# Patient Record
Sex: FEMALE | Race: WHITE | NOT HISPANIC OR LATINO | Employment: OTHER | URBAN - METROPOLITAN AREA
[De-identification: names, ages, dates, MRNs, and addresses within clinical notes are randomized per-mention and may not be internally consistent; named-entity substitution may affect disease eponyms.]

---

## 2023-05-04 ENCOUNTER — HOSPITAL ENCOUNTER (INPATIENT)
Facility: HOSPITAL | Age: 68
LOS: 13 days | Discharge: NURSING, REHAB OR LONG-TERM CARE | End: 2023-05-17
Attending: EMERGENCY MEDICINE | Admitting: INTERNAL MEDICINE
Payer: MEDICARE

## 2023-05-04 ENCOUNTER — APPOINTMENT (OUTPATIENT)
Dept: CT IMAGING | Facility: HOSPITAL | Age: 68
End: 2023-05-04
Payer: MEDICARE

## 2023-05-04 ENCOUNTER — APPOINTMENT (OUTPATIENT)
Dept: GENERAL RADIOLOGY | Facility: HOSPITAL | Age: 68
End: 2023-05-04
Payer: MEDICARE

## 2023-05-04 DIAGNOSIS — R77.8 ELEVATED TROPONIN: ICD-10-CM

## 2023-05-04 DIAGNOSIS — D64.9 ANEMIA, UNSPECIFIED TYPE: ICD-10-CM

## 2023-05-04 DIAGNOSIS — A41.9 SEPSIS, DUE TO UNSPECIFIED ORGANISM, UNSPECIFIED WHETHER ACUTE ORGAN DYSFUNCTION PRESENT: Primary | ICD-10-CM

## 2023-05-04 DIAGNOSIS — E87.1 HYPONATREMIA: ICD-10-CM

## 2023-05-04 DIAGNOSIS — R41.82 ALTERED MENTAL STATUS, UNSPECIFIED ALTERED MENTAL STATUS TYPE: ICD-10-CM

## 2023-05-04 DIAGNOSIS — N39.0 ACUTE UTI: ICD-10-CM

## 2023-05-04 DIAGNOSIS — R73.9 HYPERGLYCEMIA: ICD-10-CM

## 2023-05-04 LAB
ALBUMIN SERPL-MCNC: 3 G/DL (ref 3.5–5.2)
ALBUMIN SERPL-MCNC: 3.1 G/DL (ref 3.5–5.2)
ALBUMIN/GLOB SERPL: 1 G/DL
ALP SERPL-CCNC: 39 U/L (ref 39–117)
ALT SERPL W P-5'-P-CCNC: 14 U/L (ref 1–33)
ANION GAP SERPL CALCULATED.3IONS-SCNC: 11 MMOL/L (ref 5–15)
ANION GAP SERPL CALCULATED.3IONS-SCNC: 13.3 MMOL/L (ref 5–15)
ANION GAP SERPL CALCULATED.3IONS-SCNC: 13.4 MMOL/L (ref 5–15)
ARTERIAL PATENCY WRIST A: POSITIVE
AST SERPL-CCNC: 36 U/L (ref 1–32)
ATMOSPHERIC PRESS: 748.1 MMHG
BACTERIA UR QL AUTO: ABNORMAL /HPF
BASE EXCESS BLDA CALC-SCNC: 3.3 MMOL/L (ref 0–2)
BASOPHILS # BLD AUTO: 0.02 10*3/MM3 (ref 0–0.2)
BASOPHILS NFR BLD AUTO: 0.2 % (ref 0–1.5)
BDY SITE: ABNORMAL
BILIRUB SERPL-MCNC: 0.3 MG/DL (ref 0–1.2)
BILIRUB UR QL STRIP: NEGATIVE
BUN SERPL-MCNC: 31 MG/DL (ref 8–23)
BUN SERPL-MCNC: 34 MG/DL (ref 8–23)
BUN SERPL-MCNC: 42 MG/DL (ref 8–23)
BUN/CREAT SERPL: 50 (ref 7–25)
BUN/CREAT SERPL: 51.5 (ref 7–25)
BUN/CREAT SERPL: 57.4 (ref 7–25)
CALCIUM SPEC-SCNC: 7.9 MG/DL (ref 8.6–10.5)
CALCIUM SPEC-SCNC: 8.1 MG/DL (ref 8.6–10.5)
CALCIUM SPEC-SCNC: 9 MG/DL (ref 8.6–10.5)
CHLORIDE SERPL-SCNC: 111 MMOL/L (ref 98–107)
CHLORIDE SERPL-SCNC: 113 MMOL/L (ref 98–107)
CHLORIDE SERPL-SCNC: 115 MMOL/L (ref 98–107)
CLARITY UR: ABNORMAL
CO2 SERPL-SCNC: 23.7 MMOL/L (ref 22–29)
CO2 SERPL-SCNC: 24 MMOL/L (ref 22–29)
CO2 SERPL-SCNC: 24.6 MMOL/L (ref 22–29)
COLOR UR: YELLOW
CREAT SERPL-MCNC: 0.54 MG/DL (ref 0.57–1)
CREAT SERPL-MCNC: 0.66 MG/DL (ref 0.57–1)
CREAT SERPL-MCNC: 0.84 MG/DL (ref 0.57–1)
D-LACTATE SERPL-SCNC: 3.6 MMOL/L (ref 0.5–2)
D-LACTATE SERPL-SCNC: 3.6 MMOL/L (ref 0.5–2)
D-LACTATE SERPL-SCNC: 3.7 MMOL/L (ref 0.5–2)
D-LACTATE SERPL-SCNC: 5.1 MMOL/L (ref 0.5–2)
DEPRECATED RDW RBC AUTO: 52.5 FL (ref 37–54)
EGFRCR SERPLBLD CKD-EPI 2021: 101.1 ML/MIN/1.73
EGFRCR SERPLBLD CKD-EPI 2021: 76.3 ML/MIN/1.73
EGFRCR SERPLBLD CKD-EPI 2021: 96.3 ML/MIN/1.73
EOSINOPHIL # BLD AUTO: 0 10*3/MM3 (ref 0–0.4)
EOSINOPHIL NFR BLD AUTO: 0 % (ref 0.3–6.2)
ERYTHROCYTE [DISTWIDTH] IN BLOOD BY AUTOMATED COUNT: 14.4 % (ref 12.3–15.4)
GEN 5 2HR TROPONIN T REFLEX: 29 NG/L
GLOBULIN UR ELPH-MCNC: 3 GM/DL
GLUCOSE BLDC GLUCOMTR-MCNC: 177 MG/DL (ref 70–130)
GLUCOSE BLDC GLUCOMTR-MCNC: 181 MG/DL (ref 70–130)
GLUCOSE BLDC GLUCOMTR-MCNC: 226 MG/DL (ref 70–130)
GLUCOSE BLDC GLUCOMTR-MCNC: 230 MG/DL (ref 70–130)
GLUCOSE SERPL-MCNC: 169 MG/DL (ref 65–99)
GLUCOSE SERPL-MCNC: 187 MG/DL (ref 65–99)
GLUCOSE SERPL-MCNC: 195 MG/DL (ref 65–99)
GLUCOSE UR STRIP-MCNC: NEGATIVE MG/DL
HCO3 BLDA-SCNC: 29.7 MMOL/L (ref 22–28)
HCT VFR BLD AUTO: 33.8 % (ref 34–46.6)
HGB BLD-MCNC: 11 G/DL (ref 12–15.9)
HGB UR QL STRIP.AUTO: ABNORMAL
HYALINE CASTS UR QL AUTO: ABNORMAL /LPF
INHALED O2 CONCENTRATION: 100 %
KETONES UR QL STRIP: ABNORMAL
LEUKOCYTE ESTERASE UR QL STRIP.AUTO: ABNORMAL
LIPASE SERPL-CCNC: 73 U/L (ref 13–60)
LYMPHOCYTES # BLD AUTO: 1.43 10*3/MM3 (ref 0.7–3.1)
LYMPHOCYTES NFR BLD AUTO: 13.7 % (ref 19.6–45.3)
MCH RBC QN AUTO: 32.6 PG (ref 26.6–33)
MCHC RBC AUTO-ENTMCNC: 32.5 G/DL (ref 31.5–35.7)
MCV RBC AUTO: 100.3 FL (ref 79–97)
MODALITY: ABNORMAL
MONOCYTES # BLD AUTO: 0.73 10*3/MM3 (ref 0.1–0.9)
MONOCYTES NFR BLD AUTO: 7 % (ref 5–12)
MRSA DNA SPEC QL NAA+PROBE: ABNORMAL
NEUTROPHILS NFR BLD AUTO: 78.4 % (ref 42.7–76)
NEUTROPHILS NFR BLD AUTO: 8.17 10*3/MM3 (ref 1.7–7)
NITRITE UR QL STRIP: NEGATIVE
O2 A-A PPRESDIFF RESPIRATORY: 0.7 MMHG
PCO2 BLDA: 51 MM HG (ref 35–45)
PEEP RESPIRATORY: 5 CM[H2O]
PH BLDA: 7.37 PH UNITS (ref 7.35–7.45)
PH UR STRIP.AUTO: 5.5 [PH] (ref 5–8)
PHOSPHATE SERPL-MCNC: 2.7 MG/DL (ref 2.5–4.5)
PLATELET # BLD AUTO: 98 10*3/MM3 (ref 140–450)
PMV BLD AUTO: 13.8 FL (ref 6–12)
PO2 BLDA: 463.2 MM HG (ref 80–100)
POTASSIUM SERPL-SCNC: 3 MMOL/L (ref 3.5–5.2)
POTASSIUM SERPL-SCNC: 3.5 MMOL/L (ref 3.5–5.2)
POTASSIUM SERPL-SCNC: 5 MMOL/L (ref 3.5–5.2)
PROCALCITONIN SERPL-MCNC: 0.33 NG/ML (ref 0–0.25)
PROT SERPL-MCNC: 6 G/DL (ref 6–8.5)
PROT UR QL STRIP: ABNORMAL
QT INTERVAL: 379 MS
RBC # BLD AUTO: 3.37 10*6/MM3 (ref 3.77–5.28)
RBC # UR STRIP: ABNORMAL /HPF
REF LAB TEST METHOD: ABNORMAL
SAO2 % BLDCOA: 100 % (ref 92–99)
SET MECH RESP RATE: 14
SODIUM SERPL-SCNC: 149 MMOL/L (ref 136–145)
SODIUM SERPL-SCNC: 150 MMOL/L (ref 136–145)
SODIUM SERPL-SCNC: 150 MMOL/L (ref 136–145)
SP GR UR STRIP: 1.02 (ref 1–1.03)
SQUAMOUS #/AREA URNS HPF: ABNORMAL /HPF
TOTAL RATE: 16 BREATHS/MINUTE
TROPONIN T DELTA: -12 NG/L
TROPONIN T SERPL HS-MCNC: 41 NG/L
UROBILINOGEN UR QL STRIP: ABNORMAL
VENTILATOR MODE: ABNORMAL
VT ON VENT VENT: 500 ML
WBC # UR STRIP: ABNORMAL /HPF
WBC NRBC COR # BLD: 10.42 10*3/MM3 (ref 3.4–10.8)

## 2023-05-04 PROCEDURE — 93010 ELECTROCARDIOGRAM REPORT: CPT | Performed by: INTERNAL MEDICINE

## 2023-05-04 PROCEDURE — 99285 EMERGENCY DEPT VISIT HI MDM: CPT

## 2023-05-04 PROCEDURE — 82948 REAGENT STRIP/BLOOD GLUCOSE: CPT

## 2023-05-04 PROCEDURE — 83690 ASSAY OF LIPASE: CPT | Performed by: EMERGENCY MEDICINE

## 2023-05-04 PROCEDURE — 87641 MR-STAPH DNA AMP PROBE: CPT | Performed by: INTERNAL MEDICINE

## 2023-05-04 PROCEDURE — 94799 UNLISTED PULMONARY SVC/PX: CPT

## 2023-05-04 PROCEDURE — P9612 CATHETERIZE FOR URINE SPEC: HCPCS

## 2023-05-04 PROCEDURE — 25510000001 IOPAMIDOL PER 1 ML: Performed by: EMERGENCY MEDICINE

## 2023-05-04 PROCEDURE — 84100 ASSAY OF PHOSPHORUS: CPT | Performed by: INTERNAL MEDICINE

## 2023-05-04 PROCEDURE — 87040 BLOOD CULTURE FOR BACTERIA: CPT | Performed by: EMERGENCY MEDICINE

## 2023-05-04 PROCEDURE — 36415 COLL VENOUS BLD VENIPUNCTURE: CPT

## 2023-05-04 PROCEDURE — 25010000002 HEPARIN (PORCINE) PER 1000 UNITS: Performed by: INTERNAL MEDICINE

## 2023-05-04 PROCEDURE — 70450 CT HEAD/BRAIN W/O DYE: CPT

## 2023-05-04 PROCEDURE — 5A1955Z RESPIRATORY VENTILATION, GREATER THAN 96 CONSECUTIVE HOURS: ICD-10-PCS | Performed by: HOSPITALIST

## 2023-05-04 PROCEDURE — 93005 ELECTROCARDIOGRAM TRACING: CPT | Performed by: INTERNAL MEDICINE

## 2023-05-04 PROCEDURE — 71275 CT ANGIOGRAPHY CHEST: CPT

## 2023-05-04 PROCEDURE — 25010000002 PIPERACILLIN SOD-TAZOBACTAM PER 1 G: Performed by: EMERGENCY MEDICINE

## 2023-05-04 PROCEDURE — 25010000002 VANCOMYCIN 10 G RECONSTITUTED SOLUTION: Performed by: EMERGENCY MEDICINE

## 2023-05-04 PROCEDURE — 71045 X-RAY EXAM CHEST 1 VIEW: CPT

## 2023-05-04 PROCEDURE — 94761 N-INVAS EAR/PLS OXIMETRY MLT: CPT

## 2023-05-04 PROCEDURE — 94760 N-INVAS EAR/PLS OXIMETRY 1: CPT

## 2023-05-04 PROCEDURE — 25010000002 VANCOMYCIN PER 500 MG: Performed by: INTERNAL MEDICINE

## 2023-05-04 PROCEDURE — 82803 BLOOD GASES ANY COMBINATION: CPT

## 2023-05-04 PROCEDURE — 87186 SC STD MICRODIL/AGAR DIL: CPT | Performed by: INTERNAL MEDICINE

## 2023-05-04 PROCEDURE — 84484 ASSAY OF TROPONIN QUANT: CPT | Performed by: EMERGENCY MEDICINE

## 2023-05-04 PROCEDURE — 84145 PROCALCITONIN (PCT): CPT | Performed by: EMERGENCY MEDICINE

## 2023-05-04 PROCEDURE — 83605 ASSAY OF LACTIC ACID: CPT | Performed by: EMERGENCY MEDICINE

## 2023-05-04 PROCEDURE — 25010000002 PIPERACILLIN SOD-TAZOBACTAM PER 1 G: Performed by: INTERNAL MEDICINE

## 2023-05-04 PROCEDURE — 94002 VENT MGMT INPAT INIT DAY: CPT

## 2023-05-04 PROCEDURE — 36600 WITHDRAWAL OF ARTERIAL BLOOD: CPT

## 2023-05-04 PROCEDURE — 87077 CULTURE AEROBIC IDENTIFY: CPT | Performed by: INTERNAL MEDICINE

## 2023-05-04 PROCEDURE — 80053 COMPREHEN METABOLIC PANEL: CPT | Performed by: INTERNAL MEDICINE

## 2023-05-04 PROCEDURE — 81001 URINALYSIS AUTO W/SCOPE: CPT | Performed by: EMERGENCY MEDICINE

## 2023-05-04 PROCEDURE — 63710000001 INSULIN REGULAR HUMAN PER 5 UNITS: Performed by: INTERNAL MEDICINE

## 2023-05-04 PROCEDURE — 85025 COMPLETE CBC W/AUTO DIFF WBC: CPT | Performed by: EMERGENCY MEDICINE

## 2023-05-04 PROCEDURE — 87086 URINE CULTURE/COLONY COUNT: CPT | Performed by: INTERNAL MEDICINE

## 2023-05-04 RX ORDER — ZINC SULFATE 50(220)MG
220 CAPSULE ORAL DAILY
COMMUNITY
End: 2023-05-17 | Stop reason: HOSPADM

## 2023-05-04 RX ORDER — SODIUM CHLORIDE 0.9 % (FLUSH) 0.9 %
10 SYRINGE (ML) INJECTION AS NEEDED
Status: DISCONTINUED | OUTPATIENT
Start: 2023-05-04 | End: 2023-05-17 | Stop reason: HOSPADM

## 2023-05-04 RX ORDER — SODIUM CHLORIDE 9 MG/ML
40 INJECTION, SOLUTION INTRAVENOUS AS NEEDED
Status: DISCONTINUED | OUTPATIENT
Start: 2023-05-04 | End: 2023-05-17 | Stop reason: HOSPADM

## 2023-05-04 RX ORDER — SODIUM CHLORIDE 450 MG/100ML
125 INJECTION, SOLUTION INTRAVENOUS CONTINUOUS
Status: DISCONTINUED | OUTPATIENT
Start: 2023-05-04 | End: 2023-05-05

## 2023-05-04 RX ORDER — HEPARIN SODIUM 5000 [USP'U]/ML
5000 INJECTION, SOLUTION INTRAVENOUS; SUBCUTANEOUS EVERY 8 HOURS SCHEDULED
Status: DISCONTINUED | OUTPATIENT
Start: 2023-05-04 | End: 2023-05-17 | Stop reason: HOSPADM

## 2023-05-04 RX ORDER — VANCOMYCIN HYDROCHLORIDE 1 G/200ML
1000 INJECTION, SOLUTION INTRAVENOUS EVERY 12 HOURS
Status: DISCONTINUED | OUTPATIENT
Start: 2023-05-04 | End: 2023-05-07

## 2023-05-04 RX ORDER — POTASSIUM CHLORIDE 1.5 G/1.77G
40 POWDER, FOR SOLUTION ORAL AS NEEDED
Status: DISCONTINUED | OUTPATIENT
Start: 2023-05-04 | End: 2023-05-16 | Stop reason: SDUPTHER

## 2023-05-04 RX ORDER — DEXTROSE MONOHYDRATE 25 G/50ML
25 INJECTION, SOLUTION INTRAVENOUS
Status: DISCONTINUED | OUTPATIENT
Start: 2023-05-04 | End: 2023-05-17 | Stop reason: HOSPADM

## 2023-05-04 RX ORDER — ASCORBIC ACID 500 MG
500 TABLET ORAL 2 TIMES DAILY
COMMUNITY
End: 2023-05-17 | Stop reason: HOSPADM

## 2023-05-04 RX ORDER — CHLORAL HYDRATE 500 MG
2000 CAPSULE ORAL 2 TIMES DAILY
COMMUNITY

## 2023-05-04 RX ORDER — CHLORHEXIDINE GLUCONATE 0.12 MG/ML
15 RINSE ORAL EVERY 12 HOURS SCHEDULED
Status: DISCONTINUED | OUTPATIENT
Start: 2023-05-04 | End: 2023-05-17 | Stop reason: HOSPADM

## 2023-05-04 RX ORDER — DEXMEDETOMIDINE HYDROCHLORIDE 4 UG/ML
.2-1.5 INJECTION, SOLUTION INTRAVENOUS
Status: DISCONTINUED | OUTPATIENT
Start: 2023-05-04 | End: 2023-05-05

## 2023-05-04 RX ORDER — ACETAMINOPHEN 500 MG
1000 TABLET ORAL ONCE
Status: DISCONTINUED | OUTPATIENT
Start: 2023-05-04 | End: 2023-05-17 | Stop reason: HOSPADM

## 2023-05-04 RX ORDER — TRAMADOL HYDROCHLORIDE 50 MG/1
50 TABLET ORAL 2 TIMES DAILY
COMMUNITY
End: 2023-05-17 | Stop reason: HOSPADM

## 2023-05-04 RX ORDER — ACETAMINOPHEN 650 MG/1
650 SUPPOSITORY RECTAL ONCE
Status: COMPLETED | OUTPATIENT
Start: 2023-05-04 | End: 2023-05-04

## 2023-05-04 RX ORDER — IBUPROFEN 200 MG
1 TABLET ORAL 2 TIMES DAILY
COMMUNITY
End: 2023-05-17 | Stop reason: HOSPADM

## 2023-05-04 RX ORDER — SODIUM CHLORIDE 0.9 % (FLUSH) 0.9 %
10 SYRINGE (ML) INJECTION EVERY 12 HOURS SCHEDULED
Status: DISCONTINUED | OUTPATIENT
Start: 2023-05-04 | End: 2023-05-17 | Stop reason: HOSPADM

## 2023-05-04 RX ORDER — POTASSIUM CHLORIDE 7.45 MG/ML
10 INJECTION INTRAVENOUS
Status: DISCONTINUED | OUTPATIENT
Start: 2023-05-04 | End: 2023-05-16 | Stop reason: SDUPTHER

## 2023-05-04 RX ORDER — CASTOR OIL AND BALSAM, PERU 788; 87 MG/G; MG/G
1 OINTMENT TOPICAL EVERY 12 HOURS SCHEDULED
Status: DISCONTINUED | OUTPATIENT
Start: 2023-05-04 | End: 2023-05-17 | Stop reason: HOSPADM

## 2023-05-04 RX ORDER — NYSTATIN 100000 [USP'U]/G
POWDER TOPICAL EVERY 12 HOURS SCHEDULED
Status: DISCONTINUED | OUTPATIENT
Start: 2023-05-04 | End: 2023-05-17 | Stop reason: HOSPADM

## 2023-05-04 RX ORDER — INSULIN GLARGINE 100 [IU]/ML
15 INJECTION, SOLUTION SUBCUTANEOUS NIGHTLY
COMMUNITY
End: 2023-05-17 | Stop reason: HOSPADM

## 2023-05-04 RX ORDER — FAMOTIDINE 10 MG/ML
20 INJECTION, SOLUTION INTRAVENOUS 2 TIMES DAILY
Status: DISCONTINUED | OUTPATIENT
Start: 2023-05-04 | End: 2023-05-08

## 2023-05-04 RX ORDER — ATORVASTATIN CALCIUM 80 MG/1
80 TABLET, FILM COATED ORAL DAILY
COMMUNITY

## 2023-05-04 RX ORDER — NICOTINE POLACRILEX 4 MG
15 LOZENGE BUCCAL
Status: DISCONTINUED | OUTPATIENT
Start: 2023-05-04 | End: 2023-05-17 | Stop reason: HOSPADM

## 2023-05-04 RX ORDER — FENTANYL CITRATE 50 UG/ML
50 INJECTION, SOLUTION INTRAMUSCULAR; INTRAVENOUS
Status: DISCONTINUED | OUTPATIENT
Start: 2023-05-04 | End: 2023-05-05

## 2023-05-04 RX ORDER — IBUPROFEN 600 MG/1
1 TABLET ORAL
Status: DISCONTINUED | OUTPATIENT
Start: 2023-05-04 | End: 2023-05-17 | Stop reason: HOSPADM

## 2023-05-04 RX ORDER — AMLODIPINE BESYLATE 10 MG/1
10 TABLET ORAL DAILY
COMMUNITY

## 2023-05-04 RX ORDER — SERTRALINE HYDROCHLORIDE 25 MG/1
75 TABLET, FILM COATED ORAL DAILY
COMMUNITY
End: 2023-05-17 | Stop reason: HOSPADM

## 2023-05-04 RX ORDER — DONEPEZIL HYDROCHLORIDE 5 MG/1
5 TABLET, FILM COATED ORAL NIGHTLY
COMMUNITY
End: 2023-05-17 | Stop reason: HOSPADM

## 2023-05-04 RX ORDER — POTASSIUM CHLORIDE 1500 MG/1
20 TABLET, EXTENDED RELEASE ORAL DAILY
COMMUNITY

## 2023-05-04 RX ORDER — ZINC OXIDE 20 %
1 OINTMENT (GRAM) TOPICAL 2 TIMES DAILY
COMMUNITY
End: 2023-05-17 | Stop reason: HOSPADM

## 2023-05-04 RX ORDER — ACETAMINOPHEN 325 MG/1
650 TABLET ORAL EVERY 6 HOURS PRN
COMMUNITY

## 2023-05-04 RX ORDER — LACTULOSE 10 G/15ML
20 SOLUTION ORAL 2 TIMES DAILY PRN
COMMUNITY
End: 2023-05-17 | Stop reason: HOSPADM

## 2023-05-04 RX ORDER — ASPIRIN 81 MG/1
81 TABLET, CHEWABLE ORAL DAILY
COMMUNITY
End: 2023-05-17 | Stop reason: HOSPADM

## 2023-05-04 RX ORDER — POTASSIUM CHLORIDE 750 MG/1
40 TABLET, FILM COATED, EXTENDED RELEASE ORAL AS NEEDED
Status: DISCONTINUED | OUTPATIENT
Start: 2023-05-04 | End: 2023-05-16 | Stop reason: SDUPTHER

## 2023-05-04 RX ADMIN — Medication 10 ML: at 08:43

## 2023-05-04 RX ADMIN — FAMOTIDINE 20 MG: 10 INJECTION INTRAVENOUS at 22:04

## 2023-05-04 RX ADMIN — NYSTATIN: 100000 POWDER TOPICAL at 14:34

## 2023-05-04 RX ADMIN — CHLORHEXIDINE GLUCONATE 0.12% ORAL RINSE 15 ML: 1.2 LIQUID ORAL at 12:40

## 2023-05-04 RX ADMIN — IOPAMIDOL 95 ML: 755 INJECTION, SOLUTION INTRAVENOUS at 07:05

## 2023-05-04 RX ADMIN — INSULIN HUMAN 2 UNITS: 100 INJECTION, SOLUTION PARENTERAL at 12:40

## 2023-05-04 RX ADMIN — SODIUM CHLORIDE 125 ML/HR: 4.5 INJECTION, SOLUTION INTRAVENOUS at 08:38

## 2023-05-04 RX ADMIN — NYSTATIN: 100000 POWDER TOPICAL at 20:37

## 2023-05-04 RX ADMIN — POTASSIUM CHLORIDE 40 MEQ: 1.5 POWDER, FOR SOLUTION ORAL at 23:42

## 2023-05-04 RX ADMIN — ACETAMINOPHEN 650 MG: 650 SUPPOSITORY RECTAL at 04:19

## 2023-05-04 RX ADMIN — FAMOTIDINE 20 MG: 10 INJECTION INTRAVENOUS at 12:40

## 2023-05-04 RX ADMIN — POTASSIUM CHLORIDE 40 MEQ: 1.5 POWDER, FOR SOLUTION ORAL at 15:50

## 2023-05-04 RX ADMIN — Medication 1750 MG: at 06:12

## 2023-05-04 RX ADMIN — INSULIN HUMAN 3 UNITS: 100 INJECTION, SOLUTION PARENTERAL at 18:34

## 2023-05-04 RX ADMIN — SODIUM CHLORIDE 125 ML/HR: 4.5 INJECTION, SOLUTION INTRAVENOUS at 16:35

## 2023-05-04 RX ADMIN — HEPARIN SODIUM 5000 UNITS: 5000 INJECTION INTRAVENOUS; SUBCUTANEOUS at 20:34

## 2023-05-04 RX ADMIN — SODIUM CHLORIDE 1000 ML: 9 INJECTION, SOLUTION INTRAVENOUS at 04:28

## 2023-05-04 RX ADMIN — POTASSIUM CHLORIDE 40 MEQ: 1.5 POWDER, FOR SOLUTION ORAL at 20:34

## 2023-05-04 RX ADMIN — TAZOBACTAM SODIUM AND PIPERACILLIN SODIUM 3.38 G: 375; 3 INJECTION, SOLUTION INTRAVENOUS at 05:10

## 2023-05-04 RX ADMIN — HEPARIN SODIUM 5000 UNITS: 5000 INJECTION INTRAVENOUS; SUBCUTANEOUS at 13:19

## 2023-05-04 RX ADMIN — VANCOMYCIN HYDROCHLORIDE 1000 MG: 1 INJECTION, SOLUTION INTRAVENOUS at 18:35

## 2023-05-04 RX ADMIN — DEXMEDETOMIDINE HYDROCHLORIDE 0.2 MCG/KG/HR: 4 INJECTION, SOLUTION INTRAVENOUS at 12:25

## 2023-05-04 RX ADMIN — Medication 10 ML: at 20:35

## 2023-05-04 RX ADMIN — INSULIN HUMAN 3 UNITS: 100 INJECTION, SOLUTION PARENTERAL at 23:54

## 2023-05-04 RX ADMIN — TAZOBACTAM SODIUM AND PIPERACILLIN SODIUM 3.38 G: 375; 3 INJECTION, SOLUTION INTRAVENOUS at 19:38

## 2023-05-04 RX ADMIN — CHLORHEXIDINE GLUCONATE 0.12% ORAL RINSE 15 ML: 1.2 LIQUID ORAL at 20:34

## 2023-05-04 RX ADMIN — TAZOBACTAM SODIUM AND PIPERACILLIN SODIUM 3.38 G: 375; 3 INJECTION, SOLUTION INTRAVENOUS at 10:14

## 2023-05-04 RX ADMIN — CASTOR OIL AND BALSAM, PERU 1 APPLICATION: 788; 87 OINTMENT TOPICAL at 20:34

## 2023-05-04 NOTE — PROGRESS NOTES
Clinical Pharmacy Services: Medication History    Shama Cruz is a 68 y.o. female presenting to [facility] for Hyponatremia [E87.1]  Hyperglycemia [R73.9]  Elevated troponin [R77.8]  Acute UTI [N39.0]  Altered mental status, unspecified altered mental status type [R41.82]  Anemia, unspecified type [D64.9]  Sepsis, due to unspecified organism, unspecified whether acute organ dysfunction present [A41.9]    She  has a past medical history of Cerebral infarction, Chronic respiratory failure, Dementia, Depression, Diabetes mellitus, and Hypertension.    Allergies as of 05/04/2023    (No Known Allergies)       Medication information was obtained from: records provided by [facility]  Pharmacy and Phone Number:     Prior to Admission Medications       Prescriptions Last Dose Informant Patient Reported? Taking?    acetaminophen (TYLENOL) 325 MG tablet   Yes Yes    Administer 2 tablets per G tube Every 6 (Six) Hours As Needed for Mild Pain.    amLODIPine (NORVASC) 10 MG tablet   Yes Yes    Administer 1 tablet per G tube Daily.    ascorbic acid (VITAMIN C) 500 MG tablet   Yes Yes    Administer 1 tablet per G tube 2 (Two) Times a Day.    aspirin 81 MG chewable tablet   Yes Yes    Administer 1 tablet per G tube Daily.    atorvastatin (LIPITOR) 80 MG tablet   Yes Yes    Administer 1 tablet per G tube Daily.    Cholecalciferol liquid   Yes Yes    Administer 800 Units per G tube Daily.    donepezil (ARICEPT) 5 MG tablet  Nursing Home Yes Yes    Take 1 tablet by mouth Every Night.    insulin glargine (LANTUS, SEMGLEE) 100 UNIT/ML injection  Nursing Home Yes Yes    Inject 15 Units under the skin into the appropriate area as directed Every Night.    lactulose (CHRONULAC) 10 GM/15ML solution   Yes Yes    Administer 30 mL per G tube 2 (Two) Times a Day As Needed. For high ammonia levels, hold for more than 2 loose stools    metFORMIN (GLUCOPHAGE) 500 MG tablet   Yes Yes    Administer 1 tablet per G tube 2 (Two)  Times a Day With Meals.    metoprolol tartrate (LOPRESSOR) 25 MG tablet   Yes Yes    Administer 12.5 mg per G tube 2 (Two) Times a Day.    Multiple Vitamins-Minerals (Multivitamin Adult, Minerals,) tablet  Nursing Home Yes Yes    Administer 1 tablet per G tube Daily.    neomycin-bacitracin-polymyxin (NEOSPORIN) 5-400-5000 ointment  Nursing Home Yes Yes    Apply 1 application topically to the appropriate area as directed 2 (Two) Times a Day. To back of rt ear    Omega-3 Fatty Acids (fish oil) 1000 MG capsule capsule   Yes Yes    Take 2 capsules by mouth 2 (Two) Times a Day.    potassium chloride ER (K-TAB) 20 MEQ tablet controlled-release ER tablet   Yes Yes    1 tablet Daily.    sertraline (ZOLOFT) 25 MG tablet  Nursing Home Yes Yes    Administer 3 tablets per G tube Daily. Takes 25mg + 50mg for total 75mg daily    traMADol (ULTRAM) 50 MG tablet   Yes Yes    Administer 1 tablet per G tube 2 (Two) Times a Day.    Wound Dressings (MEDIHONEY WOUND/BURN DRESSING EX)  Nursing Home Yes Yes    Apply  topically 2 (Two) Times a Day. To bilateral buttocks wound    zinc oxide 20 % ointment   Yes Yes    Apply 1 application topically to the appropriate area as directed 2 (Two) Times a Day. To left inner ankle    zinc sulfate (ZINCATE) 220 (50 Zn) MG capsule  Nursing Home Yes Yes    Administer 1 capsule per G tube Daily.          Medication notes:     This medication list is complete to the best of my knowledge as of 5/4/2023    Please call if questions.    Vikki Torres, PharmD, BCPS  5/4/2023 15:28 EDT

## 2023-05-04 NOTE — ED TRIAGE NOTES
Pt arrived via ems from [facility]. Facility reported pt had a fever tonight and was given 650mg of tylenol. Upon EMS arrival pt was in respiratory distress. EMS intubated and gave 10 versed and 4 of etomidate en route.    Statement Selected

## 2023-05-04 NOTE — CONSULTS
"Nutrition Services    Patient Name:  Shama Cruz  YOB: 1955  MRN: [id number]  Admit Date:  5/4/2023  Assessment Date:  05/04/23    CLINICAL NUTRITION ASSESSMENT    Comments: Consult for tube feeding assessment    Pt admitted early this morning from a nursing home, intubated en route by EMS. Noted to be septic and in hypoxic respiratory failure. PEG in place. Hx of prior stroke and dementia; diabetic as well.    Current labs: Na 150, K 3.5, Cl 115, Glu 187, BUN 34 (trending down), Lipase 73, Procal 0.33    Skin intact. Obese, BMI 34. Last BM unknown. Remains on vent. IV fluids infusing at 125 ml/hr. No family at bedside.     Plan/Recommend:  1. Initiate tube feeds with Diabetisource AC @ 20 ml/hr. Advance by 10 ml Q 8 hr to goal rate 50 ml/hr. Provision: 1440 kcal, 72 gm protein, 984 ml free water.     2. Water flushes 30 ml Q 4 hr to maintain tube patency (total 180 ml). Adjust once IV fluids stopped.     3. Monitor labs, tolerance, bowel function.     RD to follow clinical course and make further recommendations as warranted.     Encounter Information         Reason for Encounter MD consult for tube feeding assessment  Indication of enteral nutrition prior to admission    Admitting Diagnosis Sepsis, acute hypoxic respiratory failure, hypernatremia, dementia, hx/o diabetes    Pertinent Medical History Pt from a nursing home. Intubated en route to [facility]. Urine noted to be cloudy upon presentation to ED.     Current Issues Intubated, AMS. PEG tube in place.      Current Nutrition Orders & Evaluation of Intake       Oral Nutrition     Food Allergies NFFA   Current PO Diet NPO Diet NPO Type: Strict NPO   Supplement    PO Evaluation NPO/no intake     % PO Intake    --  Anthropometrics          Height    Weight Height: 162.6 cm (64\")  Weight: 90.7 kg (200 lb) (05/04/23 0454)    BMI kg/m2 Body mass index is 34.33 kg/m².    BMI Category Obese, Class I (30 - 34.9)    Weight History  Wt Readings from Last 15 " Encounters:   05/04/23 0454 90.7 kg (200 lb)        Estimated/Assessed Needs        Energy Requirements    Weight for Calculation 90.7 kg   Method for Estimation  15 kcal/kg, 18 kcal/kg   EST Needs (kcal/day) 0181-3446        Protein Requirements    Weight for Calculation 54.5 kg    EST Protein Needs (g/kg) 1.2 - 1.5 gm/kg   EST Daily Needs (g/day) 65-81        Fluid Requirements     Method for Estimation 1 mL/kcal    Estimated Needs (mL/day) 1360         Physical Findings          Physical Appearance obese, overweight, sedate, ventilator support   Oral/Mouth Cavity tooth or teeth missing   Edema  generalized, 1+ (trace)   Gastrointestinal last bowel movement: not indicated   Skin  skin intact   Tubes/Drains/Lines PEG   NFPE No clinical signs of muscle wasting or fat loss (only visualized from chest up)     Labs        Pertinent Labs Reviewed, listed below     Results from last 7 days   Lab Units 05/04/23  1226 05/04/23  0830 05/04/23  0416   SODIUM mmol/L 150* 150* 149*   POTASSIUM mmol/L 3.0* 3.5 5.0   CHLORIDE mmol/L 113* 115* 111*   CO2 mmol/L 23.7 24.0 24.6   BUN mg/dL 31* 34* 42*   CREATININE mg/dL 0.54* 0.66 0.84   CALCIUM mg/dL 7.9* 8.1* 9.0   BILIRUBIN mg/dL  --   --  0.3   ALK PHOS U/L  --   --  39   ALT (SGPT) U/L  --   --  14   AST (SGOT) U/L  --   --  36*   GLUCOSE mg/dL 169* 187* 195*     Results from last 7 days   Lab Units 05/04/23  1226 05/04/23  0416   PHOSPHORUS mg/dL 2.7  --    HEMOGLOBIN g/dL  --  11.0*   HEMATOCRIT %  --  33.8*   WBC 10*3/mm3  --  10.42   ALBUMIN g/dL 3.1* 3.0*     Results from last 7 days   Lab Units 05/04/23  0416   PLATELETS 10*3/mm3 98*     No results found for: COVID19  No results found for: HGBA1C       Medications            Scheduled Medications acetaminophen, 1,000 mg, Oral, Once  chlorhexidine, 15 mL, Mouth/Throat, Q12H  famotidine, 20 mg, Intravenous, BID  heparin (porcine), 5,000 Units, Subcutaneous, Q8H  insulin regular, 2-7 Units, Subcutaneous, Q6H  nystatin, ,  Topical, Q12H  piperacillin-tazobactam, 3.375 g, Intravenous, Q8H  sodium chloride, 30 mL/kg, Intravenous, Once  sodium chloride, 500 mL, Intravenous, Once  sodium chloride, 10 mL, Intravenous, Q12H  vancomycin, 1,000 mg, Intravenous, Q12H        Infusions dexmedetomidine, 0.2-1.5 mcg/kg/hr, Last Rate: 0.2 mcg/kg/hr (05/04/23 1225)  Pharmacy to dose vancomycin,   sodium chloride, 125 mL/hr, Last Rate: 125 mL/hr (05/04/23 0838)        PRN Medications •  dextrose  •  dextrose  •  fentaNYL citrate (PF)  •  glucagon (human recombinant)  •  Pharmacy to dose vancomycin  •  sodium chloride  •  sodium chloride     PES STATEMENT / NUTRITION DIAGNOSIS      Nutrition Dx Problem  Problem: Needs Alternative Route  Etiology: Medical Diagnosis Dysphagia, Resp failure/vent  Signs/Symptoms: NPO    Comment: PEG tube in place     PLAN OF CARE  Intervention Goal        Intervention Goal(s) Nutrition support treatment, Meet estimated needs, Disease management/therapy, Initiate TF/PN, Tolerate TF/PN at goal and No significant weight loss     Nutrition Intervention         RD Action Follow Tx Progress, Care plan reviewed and Recommend/ordered:      Nutrition Prescription          Recommendation       Enteral Prescription:     Enteral Route PEG    TF Delivery Method Continuous    Enteral Product Diabetisource AC    Modular None    Propofol Rate/Kcal -    TF Start Rate (mL/hr) 20    TF Goal Rate (mL/hr) 50    Free Water Flush (mL) 30 ml Q 4 hr    TF Provision at Goal: 1440 kcal, 72 gm protein, 984 mL free water + 180 mL water flushes         Calories 100 % needs met         Protein  100 % needs met         Fluid (mL) 1164 mL total     Prescription Ordered Yes     Monitor/Evaluation        Monitor Per protocol     RD to follow up per protocol.    Electronically signed by:  Nancy Pierson RD  05/04/23 13:53 EDT

## 2023-05-04 NOTE — ED NOTES
"Nursing report ED to floor  Shama Cruz  68 y.o.  female    HPI :   Chief Complaint   Patient presents with    Respiratory Distress       Admitting doctor:   Moe Munoz MD    Admitting diagnosis:   The primary encounter diagnosis was Sepsis, due to unspecified organism, unspecified whether acute organ dysfunction present. Diagnoses of Acute UTI, Altered mental status, unspecified altered mental status type, Hyperglycemia, Hyponatremia, Anemia, unspecified type, and Elevated troponin were also pertinent to this visit.    Code status:   Current Code Status       Date Active Code Status Order ID Comments User Context       Not on file            Allergies:   Patient has no known allergies.    Isolation:   No active isolations    Intake and Output  No intake or output data in the 24 hours ending 05/04/23 0529    Weight:       05/04/23  0454   Weight: 90.7 kg (200 lb)       Most recent vitals:   Vitals:    05/04/23 0454 05/04/23 0456 05/04/23 0513 05/04/23 0526   BP:  105/64 116/63 120/64   Pulse:  111 112 109   Resp:       Temp:       TempSrc:       SpO2:  94% 95% 96%   Weight: 90.7 kg (200 lb)      Height: 162.6 cm (64\")          Active LDAs/IV Access:   Lines, Drains & Airways       Active LDAs       Name Placement date Placement time Site Days    Peripheral IV 05/04/23 0239 Left Forearm 05/04/23  0239  Forearm  less than 1    Peripheral IV 05/04/23 0503 Left Hand 05/04/23  0503  Hand  less than 1    ETT  05/04/23  --  -- less than 1                    Labs (abnormal labs have a star):   Labs Reviewed   BLOOD GAS, ARTERIAL - Abnormal; Notable for the following components:       Result Value    pCO2, Arterial 51.0 (*)     pO2, Arterial 463.2 (*)     HCO3, Arterial 29.7 (*)     Base Excess, Arterial 3.3 (*)     O2 Saturation Calculated 100.0 (*)     All other components within normal limits   COMPREHENSIVE METABOLIC PANEL - Abnormal; Notable for the following components:    Glucose 195 (*)     BUN 42 (*)     " "Sodium 149 (*)     Chloride 111 (*)     Albumin 3.0 (*)     AST (SGOT) 36 (*)     BUN/Creatinine Ratio 50.0 (*)     All other components within normal limits    Narrative:     GFR Normal >60  Chronic Kidney Disease <60  Kidney Failure <15     LIPASE - Abnormal; Notable for the following components:    Lipase 73 (*)     All other components within normal limits   URINALYSIS W/ MICROSCOPIC IF INDICATED (NO CULTURE) - Abnormal; Notable for the following components:    Appearance, UA Cloudy (*)     Ketones, UA Trace (*)     Blood, UA Small (1+) (*)     Protein, UA >=300 mg/dL (3+) (*)     Leuk Esterase, UA Moderate (2+) (*)     All other components within normal limits   PROCALCITONIN - Abnormal; Notable for the following components:    Procalcitonin 0.33 (*)     All other components within normal limits    Narrative:     As a Marker for Sepsis (Non-Neonates):    1. <0.5 ng/mL represents a low risk of severe sepsis and/or septic shock.  2. >2 ng/mL represents a high risk of severe sepsis and/or septic shock.    As a Marker for Lower Respiratory Tract Infections that require antibiotic therapy:    PCT on Admission    Antibiotic Therapy       6-12 Hrs later    >0.5                Strongly Recommended  >0.25 - <0.5        Recommended   0.1 - 0.25          Discouraged              Remeasure/reassess PCT  <0.1                Strongly Discouraged     Remeasure/reassess PCT    As 28 day mortality risk marker: \"Change in Procalcitonin Result\" (>80% or <=80%) if Day 0 (or Day 1) and Day 4 values are available. Refer to [email or web address]    Change in PCT <=80%  A decrease of PCT levels below or equal to 80% defines a positive change in PCT test result representing a higher risk for 28-day all-cause mortality of patients diagnosed with severe sepsis for septic shock.    Change in PCT >80%  A decrease of PCT levels of more than 80% defines a negative change in PCT result representing a lower risk for 28-day " all-cause mortality of patients diagnosed with severe sepsis or septic shock.      TROPONIN - Abnormal; Notable for the following components:    HS Troponin T 41 (*)     All other components within normal limits    Narrative:     High Sensitive Troponin T Reference Range:  <10.0 ng/L- Negative Female for AMI  <15.0 ng/L- Negative Male for AMI  >=10 - Abnormal Female indicating possible myocardial injury.  >=15 - Abnormal Male indicating possible myocardial injury.   Clinicians would have to utilize clinical acumen, EKG, Troponin, and serial changes to determine if it is an Acute Myocardial Infarction or myocardial injury due to an underlying chronic condition.        LACTIC ACID, PLASMA - Abnormal; Notable for the following components:    Lactate 5.1 (*)     All other components within normal limits   CBC WITH AUTO DIFFERENTIAL - Abnormal; Notable for the following components:    RBC 3.37 (*)     Hemoglobin 11.0 (*)     Hematocrit 33.8 (*)     .3 (*)     MPV 13.8 (*)     Platelets 98 (*)     Neutrophil % 78.4 (*)     Lymphocyte % 13.7 (*)     Eosinophil % 0.0 (*)     Neutrophils, Absolute 8.17 (*)     All other components within normal limits   URINALYSIS, MICROSCOPIC ONLY - Abnormal; Notable for the following components:    RBC, UA 3-5 (*)     WBC, UA 6-12 (*)     Bacteria, UA 2+ (*)     All other components within normal limits   BLOOD CULTURE   BLOOD CULTURE   LACTIC ACID, REFLEX   HIGH SENSITIVITIY TROPONIN T 2HR   CBC AND DIFFERENTIAL    Narrative:     The following orders were created for panel order CBC & Differential.  Procedure                               Abnormality         Status                     ---------                               -----------         ------                     CBC Auto Differential[[id number]]        Abnormal            Final result                 Please view results for these tests on the individual orders.       EKG:   ECG 12 Lead    (Results Pending)       Meds given  in ED:   Medications   acetaminophen (TYLENOL) tablet 1,000 mg (has no administration in time range)   vancomycin 1750 mg/500 mL 0.9% NS IVPB ([facility]) (has no administration in time range)   piperacillin-tazobactam (ZOSYN) 3.375 g in iso-osmotic dextrose 50 ml (premix) (3.375 g Intravenous New Bag 5/4/23 0510)   sodium chloride 0.9 % bolus 2,721 mL (has no administration in time range)   sodium chloride 0.9 % bolus 500 mL (has no administration in time range)   sodium chloride 0.9 % bolus 1,000 mL (1,000 mL Intravenous New Bag 5/4/23 2608)   acetaminophen (TYLENOL) suppository 650 mg (650 mg Rectal Given 5/4/23 8499)       Imaging results:  XR Chest 1 View    Result Date: 5/4/2023  Endotracheal tube terminates in satisfactory position.  This report was finalized on 5/4/2023 3:08 AM by Dr. Marisol Quinones M.D.       Ambulatory status:   - bedrest     Social issues:   Social History     Socioeconomic History    Marital status:        NIH Stroke Scale:         Sowmya Sanchez RN  05/04/23 05:29 EDT

## 2023-05-04 NOTE — PROGRESS NOTES
"  Daily Progress Note.   [facility] CARDIAC INTENSIVE CARE  5/4/2023    Patient:  Name:  Shama Cruz  MRN:  [id number]  1955  68 y.o.  female         CC: Unresponsive sepsis on mechanical ventilation    Interval History:  Patient was febrile at the nursing home altered and intubated by emergency medical services.  Brought into the ER.  She is on mechanical ventilation was admitted overnight by Dr. Gomez.  Antibiotics started.    Present the patient is on mechanical ventilation she does respond to noxious stimuli her lower extremities.  Will not follow commands at present time she is on some Precedex.        Physical Exam:  /63   Pulse 95   Temp 97.8 °F (36.6 °C) (Axillary)   Resp 16   Ht 162.6 cm (64\")   Wt 90.7 kg (200 lb)   SpO2 97%   BMI 34.33 kg/m²   Body mass index is 34.33 kg/m².    Intake/Output Summary (Last 24 hours) at 5/4/2023 1211  Last data filed at 5/4/2023 0842  Gross per 24 hour   Intake 50 ml   Output 400 ml   Net -350 ml   Vent Settings          Resp Rate (Set): 16  Pressure Support (cm H2O): 0 cm H20  FiO2 (%): 25 %  PEEP/CPAP (cm H2O): 5 cm H20    Minute Ventilation (L/min) (Obs): 8.19 L/min  Resp Rate (Observed) Vent: 16     I:E Ratio (Obs): 1:2.3    PIP Observed (cm H2O): 30 cm H2O  Plateau Pressure (cm H2O): 0 cm H2O  Driving Pressure (cm H2O): -4.7 cm H2O    General appearance: On mechanical ventilation  Eyes: anicteric sclerae, moist conjunctivae; no lidlag;    HENT: Atraumatic; oropharynx limited secondary to endotracheal tube  Lungs: Bilateral air entry, with synchronous respiratory effort and no intercostal retractions  CV: RRR, no rub   Abdomen: Obese bowel sounds are present nonrigid PEG tube in place without purulence  Extremities: No peripheral edema positive chronic changes to lower extremities with multiple wounds with Mepilex on present upon arrival  Skin: WWP no diffuse visible rash  Psych/neuro sedated present time limiting neuro exam.  Does " withdraw to noxious stimuli in bilateral lower extremities.    Data Review:  Notable Labs:  Results from last 7 days   Lab Units 05/04/23  0416   WBC 10*3/mm3 10.42   HEMOGLOBIN g/dL 11.0*   PLATELETS 10*3/mm3 98*     Results from last 7 days   Lab Units 05/04/23  0830 05/04/23  0416   SODIUM mmol/L 150* 149*   POTASSIUM mmol/L 3.5 5.0   CHLORIDE mmol/L 115* 111*   CO2 mmol/L 24.0 24.6   BUN mg/dL 34* 42*   CREATININE mg/dL 0.66 0.84   GLUCOSE mg/dL 187* 195*   CALCIUM mg/dL 8.1* 9.0   Estimated Creatinine Clearance: 90.2 mL/min (by C-G formula based on SCr of 0.66 mg/dL).    Results from last 7 days   Lab Units 05/04/23  0830 05/04/23 0416   AST (SGOT) U/L  --  36*   ALT (SGPT) U/L  --  14   PROCALCITONIN ng/mL  --  0.33*   LACTATE mmol/L 3.6* 5.1*   PLATELETS 10*3/mm3  --  98*       Results from last 7 days   Lab Units 05/04/23  0304   PH, ARTERIAL pH units 7.373   PCO2, ARTERIAL mm Hg 51.0*   PO2 ART mm Hg 463.2*   HCO3 ART mmol/L 29.7*       Imaging:  Reviewed chest images personally from past 3 days  CT scan showing multiple chronic strokes no acute pathology.      ASSESSMENT  /  PLAN:  History of prior stroke  Acute hypoxemic respiratory failure  Sepsis  Pneumonia  UTI  Lactic acidosis  Mild hyponatremia  Diabetes  Dementia  Management of mechanical ventilation.  Thrombocytopenia  Status post PEG    Start Precedex  As needed fentanyl  Continue Zosyn for now  Follow microbiology.  Half-normal saline  Recheck sodium midday  Sliding-scale insulin Newstart  Mechanical ventilation reviewed ABG reviewed.  Adjustments as appropriate.  Reviewed H&P imaging and lab values.  Discussed with multidiscipline team on morning rounds.      Total critical care time was 35minutes, excluding any separately billable procedure time.  Time did not overlap with any other provider.      Rubén Rasmussen MD  [facility]  05/04/23  12:24 EDT

## 2023-05-04 NOTE — PLAN OF CARE
Pt brought up to floor at start of shift intubated and no sedation. Pt opens eyes to voice, intermittently following commands with upper extremities. Purposeful movement independently. Pt has been afebrile all shift. Upon skin assessment, pressure ulcers found. Wound nurse consulted. Pt has excoriation and rash around perineum and skin folds, nystatin order with application. At start of shift, pt was ST with PACs. EKG obtained. Currently SR-ST with PACs and some bigeminy. K+ replacement protocol initiated. Pt normotensive at beginning of shift. Once precedex started, blood pressures hypotensive with MAP staying >60. SubQ heparin started for DVT prophylaxis. Ventilator settings weaned throughout shift. Pt secretions scant, thick, and white. G-tube placed prior to admission. Tube feeds started. SSI started with Q6 blood sugar. One smear BM. Pt straight cathed due to retention. Sharma placed later on in shift due to continued retention. MRSA swab came back positive. Placed in contact precautions and abx infusing per orders. R wrist fistula present with thrill and bruit, no stick R arm.     [facility] was reached out to. They sent pt records to hospital. Daughter is listed as next of kin, [employer] informed staff that daughter is not present in care (daughter potentially in a nursing facility as well). Case management in contact to help figure out guardianship status. [facility] informed staff guardianship hearing is on 06/07/23.

## 2023-05-04 NOTE — ED PROVIDER NOTES
EMERGENCY DEPARTMENT ENCOUNTER  I wore full protective equipment throughout this patient encounter including a N95 mask, eye shield, gown and gloves. Hand hygiene was performed before donning protective equipment and after removal when leaving the room.    Room Number:  11/11  Date of encounter:  5/4/2023  PCP: Archie Murray MD  Patient Care Team:  Archie Murray MD as PCP - General (Pulmonary Disease)     HPI:  Context: Shama Cruz is a 68 y.o. female who presents to the ED c/o chief complaint of fever and respiratory distress.  Patient unable to give history secondary to being intubated, history supplied by EMS.  Patient from facility, called out for fever and hypoxia.  EMS reports that when they arrived, patient was in respiratory distress, patient was intubated, given etomidate and Versed, no paralytic.  EMS reports that patient is 99% while being bagged, blood pressure stable throughout transport, temperature was reportedly 103.5 at facility, patient did receive Tylenol prior to EMS arrival.    MEDICAL HISTORY REVIEW  Reviewed in EPIC    PAST MEDICAL HISTORY  Active Ambulatory Problems     Diagnosis Date Noted   • No Active Ambulatory Problems     Resolved Ambulatory Problems     Diagnosis Date Noted   • No Resolved Ambulatory Problems     Past Medical History:   Diagnosis Date   • Cerebral infarction    • Chronic respiratory failure    • Dementia    • Depression    • Diabetes mellitus    • Hypertension        PAST SURGICAL HISTORY  History reviewed. No pertinent surgical history.    FAMILY HISTORY  History reviewed. No pertinent family history.    SOCIAL HISTORY  Social History     Socioeconomic History   • Marital status:        ALLERGIES  Patient has no known allergies.    The patient's allergies have been reviewed    REVIEW OF SYSTEMS  Unable to obtain review of systems secondary to intubation    PHYSICAL EXAM  I have reviewed the triage vital signs and nursing notes.  ED Triage Vitals    Temp Heart Rate Resp BP SpO2   05/04/23 0245 05/04/23 0245 05/04/23 0245 05/04/23 0245 05/04/23 0245   (!) 102 °F (38.9 °C) (!) 128 20 107/60 100 %      Temp src Heart Rate Source Patient Position BP Location FiO2 (%)   05/04/23 0249 -- -- -- 05/04/23 0244   Rectal    100 %       General: Intubated, comatose  HENT: NCAT, PERRL, Nares patent.  Eyes: no scleral icterus.  Neck: trachea midline, no ROM limitations.  CV: regular rhythm, tachycardic rate.  Respiratory: Actively being bagged, bilateral breath sounds with bagging, crackles bilateral lung bases   abdomen: soft, nondistendedMusculoskeletal: no deformity.  Neuro: GCS 3 T  Skin: warm, dry.    LAB RESULTS  Recent Results (from the past 24 hour(s))   Blood Gas, Arterial -    Collection Time: 05/04/23  3:04 AM    Specimen: Arterial Blood   Result Value Ref Range    Site Arterial: left radial     Thomas's Test Positive     pH, Arterial 7.373 7.350 - 7.450 pH units    pCO2, Arterial 51.0 (H) 35.0 - 45.0 mm Hg    pO2, Arterial 463.2 (H) 80.0 - 100.0 mm Hg    HCO3, Arterial 29.7 (H) 22.0 - 28.0 mmol/L    Base Excess, Arterial 3.3 (H) 0.0 - 2.0 mmol/L    O2 Saturation Calculated 100.0 (H) 92.0 - 99.0 %    A-a DO2 0.7 mmHg    Barometric Pressure for Blood Gas 748.1 mmHg    Modality Adult Vent     FIO2 100 %    Ventilator Mode VC     Set Tidal Volume 500     Set Mech Resp Rate 14     Rate 16 Breaths/minute    PEEP 5    Comprehensive Metabolic Panel    Collection Time: 05/04/23  4:16 AM    Specimen: Blood   Result Value Ref Range    Glucose 195 (H) 65 - 99 mg/dL    BUN 42 (H) 8 - 23 mg/dL    Creatinine 0.84 0.57 - 1.00 mg/dL    Sodium 149 (H) 136 - 145 mmol/L    Potassium 5.0 3.5 - 5.2 mmol/L    Chloride 111 (H) 98 - 107 mmol/L    CO2 24.6 22.0 - 29.0 mmol/L    Calcium 9.0 8.6 - 10.5 mg/dL    Total Protein 6.0 6.0 - 8.5 g/dL    Albumin 3.0 (L) 3.5 - 5.2 g/dL    ALT (SGPT) 14 1 - 33 U/L    AST (SGOT) 36 (H) 1 - 32 U/L    Alkaline Phosphatase 39 39 - 117 U/L    Total  Bilirubin 0.3 0.0 - 1.2 mg/dL    Globulin 3.0 gm/dL    A/G Ratio 1.0 g/dL    BUN/Creatinine Ratio 50.0 (H) 7.0 - 25.0    Anion Gap 13.4 5.0 - 15.0 mmol/L    eGFR 76.3 >60.0 mL/min/1.73   Lipase    Collection Time: 05/04/23  4:16 AM    Specimen: Blood   Result Value Ref Range    Lipase 73 (H) 13 - 60 U/L   Procalcitonin    Collection Time: 05/04/23  4:16 AM    Specimen: Blood   Result Value Ref Range    Procalcitonin 0.33 (H) 0.00 - 0.25 ng/mL   High Sensitivity Troponin T    Collection Time: 05/04/23  4:16 AM    Specimen: Blood   Result Value Ref Range    HS Troponin T 41 (H) <10 ng/L   Lactic Acid, Plasma    Collection Time: 05/04/23  4:16 AM    Specimen: Blood   Result Value Ref Range    Lactate 5.1 (C) 0.5 - 2.0 mmol/L   CBC Auto Differential    Collection Time: 05/04/23  4:16 AM    Specimen: Blood   Result Value Ref Range    WBC 10.42 3.40 - 10.80 10*3/mm3    RBC 3.37 (L) 3.77 - 5.28 10*6/mm3    Hemoglobin 11.0 (L) 12.0 - 15.9 g/dL    Hematocrit 33.8 (L) 34.0 - 46.6 %    .3 (H) 79.0 - 97.0 fL    MCH 32.6 26.6 - 33.0 pg    MCHC 32.5 31.5 - 35.7 g/dL    RDW 14.4 12.3 - 15.4 %    RDW-SD 52.5 37.0 - 54.0 fl    MPV 13.8 (H) 6.0 - 12.0 fL    Platelets 98 (L) 140 - 450 10*3/mm3    Neutrophil % 78.4 (H) 42.7 - 76.0 %    Lymphocyte % 13.7 (L) 19.6 - 45.3 %    Monocyte % 7.0 5.0 - 12.0 %    Eosinophil % 0.0 (L) 0.3 - 6.2 %    Basophil % 0.2 0.0 - 1.5 %    Neutrophils, Absolute 8.17 (H) 1.70 - 7.00 10*3/mm3    Lymphocytes, Absolute 1.43 0.70 - 3.10 10*3/mm3    Monocytes, Absolute 0.73 0.10 - 0.90 10*3/mm3    Eosinophils, Absolute 0.00 0.00 - 0.40 10*3/mm3    Basophils, Absolute 0.02 0.00 - 0.20 10*3/mm3   Urinalysis With Microscopic If Indicated (No Culture) - Urine, Catheter    Collection Time: 05/04/23  4:28 AM    Specimen: Urine, Catheter   Result Value Ref Range    Color, UA Yellow Yellow, Straw    Appearance, UA Cloudy (A) Clear    pH, UA 5.5 5.0 - 8.0    Specific Gravity, UA 1.022 1.005 - 1.030    Glucose, UA  Negative Negative    Ketones, UA Trace (A) Negative    Bilirubin, UA Negative Negative    Blood, UA Small (1+) (A) Negative    Protein, UA >=300 mg/dL (3+) (A) Negative    Leuk Esterase, UA Moderate (2+) (A) Negative    Nitrite, UA Negative Negative    Urobilinogen, UA 1.0 E.U./dL 0.2 - 1.0 E.U./dL   Urinalysis, Microscopic Only - Urine, Catheter    Collection Time: 05/04/23  4:28 AM    Specimen: Urine, Catheter   Result Value Ref Range    RBC, UA 3-5 (A) None Seen, 0-2 /HPF    WBC, UA 6-12 (A) None Seen, 0-2 /HPF    Bacteria, UA 2+ (A) None Seen /HPF    Squamous Epithelial Cells, UA 0-2 None Seen, 0-2 /HPF    Hyaline Casts, UA None Seen None Seen /LPF    Methodology Manual Light Microscopy        I ordered the above labs and reviewed the results.    RADIOLOGY  XR Chest 1 View    Result Date: 5/4/2023  SINGLE VIEW OF THE CHEST  HISTORY: Respiratory distress  COMPARISON: None available.  FINDINGS: Endotracheal tube terminates in satisfactory position. No pneumothorax or pleural effusion is seen. There is diffuse interstitial prominence. There may be some mild bibasilar atelectasis.      Endotracheal tube terminates in satisfactory position.  This report was finalized on 5/4/2023 3:08 AM by Dr. Marisol Quinones M.D.        I ordered the above noted radiological studies. I reviewed the images and results. I agree with the radiologist interpretation.    PROCEDURES  Procedures    MEDICATIONS GIVEN IN ER  Medications   acetaminophen (TYLENOL) tablet 1,000 mg (has no administration in time range)   sodium chloride 0.9 % bolus 1,000 mL (1,000 mL Intravenous New Bag 5/4/23 1557)   vancomycin 1750 mg/500 mL 0.9% NS IVPB ([facility]) (has no administration in time range)   piperacillin-tazobactam (ZOSYN) 3.375 g in iso-osmotic dextrose 50 ml (premix) (3.375 g Intravenous New Bag 5/4/23 2362)   sodium chloride 0.9 % bolus 2,721 mL (has no administration in time range)   sodium chloride 0.9 % bolus 500 mL (has no administration in  time range)   acetaminophen (TYLENOL) suppository 650 mg (650 mg Rectal Given 5/4/23 6844)       PROGRESS, DATA ANALYSIS, CONSULTS, AND MEDICAL DECISION MAKING  A complete history and physical exam have been performed.  All available laboratory and imaging results have been reviewed by myself prior to disposition.    MDM  After the initial H&P, I discussed pertinent information from history and physical exam with patient/family.  Discussed differential diagnosis.  Discussed plan for ED evaluation/workup/treatment.  All questions answered.  Patient/family is agreeable with plan.  ED Course as of 05/04/23 0525   u May 04, 2023   0238 Call ahead by EMS, patient arrived intubated, I was waiting in the room at the patient's arrival. [JG]   0245 I reviewed chest x-ray in PACS, my interpretation is endotracheal tube in good position. [JG]   0436 EKG independently viewed and contemporaneously interpreted by ED physician. Time: 1634.  Rate 116.  Interpretation: Sinus tachycardia, normal axis, early R wave transition, no acute ST changes. [JG]   0436 No prior EKG for comparison. [JG]   0502 Lactic acid 5.1.  Giving sepsis fluid bolus. [JG]   0502 Patient febrile, lactic acid significantly elevated.  Chest x-ray shows no obvious pneumonia, urinalysis microscopy currently pending.  Treating for undifferentiated sepsis, given vancomycin and Zosyn. [JG]   0519 Phone call with Dr Munoz, pulmonology.  Discussed the patient, relevant history, exam, diagnostics, ED findings/progress, and concerns. They agree to admit the patient to ICU.  He request us to obtain CT angiogram instead of CT chest without contrast.  Will comply.. Care assumed by the admitting physician at this time.     [JG]      ED Course User Index  [JG] Julian Merritt MD       AS OF 05:25 EDT VITALS:    BP - 116/63  HR - 112  TEMP - (!) 103.4 °F (39.7 °C) (Rectal)  O2 SATS - 95%    DIAGNOSIS  Final diagnoses:   Sepsis, due to unspecified organism, unspecified  whether acute organ dysfunction present   Acute UTI   Altered mental status, unspecified altered mental status type   Hyperglycemia   Hyponatremia   Anemia, unspecified type   Elevated troponin     Critical care:  Total critical care time of 48 minutes is exclusive of any other billable procedures and includes time spent with direct patient care and observation, retrospective chart review, management of acute condition, and consultation with other physicians.      DISPOSITION  ADMISSION    Discussed treatment plan and reason for admission with pt/family and admitting physician.  Pt/family voiced understanding of the plan for admission for further testing/treatment as needed.          Julian Merritt MD  05/04/23 0532

## 2023-05-04 NOTE — NURSING NOTE
Nurse attempted to call daughter through phone number in chart. There was no answer through phone call and phone number did not allow for voice mail to be left.

## 2023-05-04 NOTE — DISCHARGE PLACEMENT REQUEST
"Shama Gallegos (68 y.o. Female)     Date of Birth   1955    Social Security Number       Address   [address] [facility] [place] [id number]    Home Phone       MRN   [id number]       Rastafarian   Unknown    Marital Status                               Admission Date   5/4/23    Admission Type   Emergency    Admitting Provider   Moe Munoz MD    Attending Provider   Moe Munoz MD    Department, Room/Bed   [facility] CARDIAC INTENSIVE CARE, 3007/1       Discharge Date       Discharge Disposition       Discharge Destination                               Attending Provider: Moe Munoz MD    Allergies: No Known Allergies    Isolation: None   Infection: MRSA (05/04/23)   Code Status: CPR    Ht: 162.6 cm (64\")   Wt: 90.7 kg (200 lb)    Admission Cmt: None   Principal Problem: Sepsis, due to unspecified organism, unspecified whether acute organ dysfunction present [A41.9]                 Active Insurance as of 5/4/2023     Primary Coverage     Payor Plan Insurance Group Employer/Plan Group    ANTHEM MEDICARE REPLACEMENT ANTHEM MEDICARE ADVANTAGE [id number]     Payor Plan Address Payor Plan Phone Number Payor Plan Fax Number Effective Dates    [address] [phone]  2/1/2022 - None Entered    [facility] [address]       Subscriber Name Subscriber Birth Date Member ID       SHAMA GALLEGOS 1955 [id number]           Secondary Coverage     Payor Plan Insurance Group Employer/Plan Group    [place]      Payor Plan Address Payor Plan Phone Number Payor Plan Fax Number Effective Dates    [address] [phone]  5/4/2023 - None Entered    [facility] [address]       Subscriber Name Subscriber Birth Date Member ID       SHAMA GALLEGOS 1955 [id number]                 Emergency Contacts      (Rel.) Home Phone Work Phone Mobile Phone    ESTEFANIAANKIT (Daughter) [phone] -- [phone]            "

## 2023-05-04 NOTE — H&P
"H&P NOTE    Patient Identification:  Shama Cruz  68 y.o.  female  1955  [id number]                CC: Fever acute respite distress    History of Present Illness:  68-year-old female brought in by EMS intubated.  Apparently she is from a facility and was noted to have fever up to 103.5.  She was febrile up to 102 here.  Patient currently on the vent unable to give me any meaningful history.  Currently oxygen requirement 30% with sats almost 99% on the vent.  No history of aspiration was reported.  Urine was noted to be cloudy per ER physician.      Review of Systems  Unable to get with patient's current condition  Past Medical History:  Past Medical History:   Diagnosis Date   • Cerebral infarction    • Chronic respiratory failure    • Dementia    • Depression    • Diabetes mellitus    • Hypertension        Past Surgical History:  History reviewed. No pertinent surgical history.     Home Meds:  (Not in a hospital admission)      Allergies:  No Known Allergies    Social History:   Social History     Socioeconomic History   • Marital status:        Family History:  History reviewed. No pertinent family history.    Physical Exam:  /64   Pulse 109   Temp (!) 103.4 °F (39.7 °C) (Rectal)   Resp 20   Ht 162.6 cm (64\")   Wt 90.7 kg (200 lb)   SpO2 96%   BMI 34.33 kg/m²  Body mass index is 34.33 kg/m². 96% 90.7 kg (200 lb)  Physical Exam  Patient is examined using the personal protective equipment as per guidelines from infection control for this particular patient as enacted.  Hand hygiene was performed before and after patient interaction.  Elderly female sedated intubated on the vent not following commands for me  Eyes normal conjunctivae and pupils reactive to light  ENT ET tube good position orally  Neck midline trachea no thyromegaly  Chest no labored breathing clear  CVS regular rate and rhythm no lower extremity edema  Abdomen soft nontender no hepatosplenomegaly  CNS sedated " intubated  Skin no rashes no nodules  Psych sedated intubated  Musculoskeletal no cyanosis no clubbing         LABS:  Lab Results   Component Value Date    CALCIUM 9.0 05/04/2023     Results from last 7 days   Lab Units 05/04/23  0416   SODIUM mmol/L 149*   POTASSIUM mmol/L 5.0   CHLORIDE mmol/L 111*   CO2 mmol/L 24.6   BUN mg/dL 42*   CREATININE mg/dL 0.84   GLUCOSE mg/dL 195*   CALCIUM mg/dL 9.0   WBC 10*3/mm3 10.42   HEMOGLOBIN g/dL 11.0*   PLATELETS 10*3/mm3 98*   ALT (SGPT) U/L 14   AST (SGOT) U/L 36*   PROCALCITONIN ng/mL 0.33*     Lab Results   Component Value Date    TROPONINT 41 (H) 05/04/2023     Results from last 7 days   Lab Units 05/04/23  0416   HSTROP T ng/L 41*         Results from last 7 days   Lab Units 05/04/23  0416   PROCALCITONIN ng/mL 0.33*   LACTATE mmol/L 5.1*     Results from last 7 days   Lab Units 05/04/23  0304   PH, ARTERIAL pH units 7.373   PCO2, ARTERIAL mm Hg 51.0*   PO2 ART mm Hg 463.2*   MODALITY  Adult Vent   O2 SATURATION CALC % 100.0*                 No results found for: TSH  Estimated Creatinine Clearance: 70.9 mL/min (by C-G formula based on SCr of 0.84 mg/dL).  Results from last 7 days   Lab Units 05/04/23  0428   NITRITE UA  Negative   WBC UA /HPF 6-12*   BACTERIA UA /HPF 2+*   SQUAM EPITHEL UA /HPF 0-2        Imaging: I personally visualized the images of scans/x-rays performed within last 3 days.      Assessment:  Sepsis, due to unspecified organism, unspecified whether acute organ dysfunction present  Acute hypoxemic respiratory failure  Sepsis  Pneumonia versus UTI  Lactic acidosis  Hypernatremia  History of diabetes mellitus  Dementia      Recommendations:  Continue current vent support with oxygen requirement minimal.  ABGs reviewed with adequate oxygenation  Source of sepsis I suspect more UTI.  Chest x-ray with possible bibasilar infiltrate.  Will continue antibiotics initiated in the emergency room Zosyn vancomycin for now.  De-escalate based on culture  IV fluids  will be given and lactate will be trended  Sodium slightly elevated will use half-normal saline and watch sodium closely.  Blood glucose monitoring per ICU protocol  ICU core measures.    Critical care time 35 minutes        Moe Munoz MD  5/4/2023  05:59 EDT      Much of this encounter note is an electronic transcription/translation of spoken language to printed text using Dragon Software.

## 2023-05-04 NOTE — PLAN OF CARE
Goal Outcome Evaluation:  Plan of Care Reviewed With: patient           Outcome Evaluation: Patient intubated, SLP to sign off.

## 2023-05-04 NOTE — CASE MANAGEMENT/SOCIAL WORK
Discharge Planning Assessment  [facility]     Patient Name: Shama Cruz  MRN: [id number]  Today's Date: 5/4/2023    Admit Date: 5/4/2023    Plan: Follow up with patient or daughter to confirm return to LTC bed at [facility]   Discharge Needs Assessment    No documentation.                Discharge Plan     Row Name 05/04/23 1607       Plan    Plan Follow up with patient or daughter to confirm return to LTC bed at [facility]    Patient/Family in Agreement with Plan unable to assess    Plan Comments CCP noted patient is on ventilator. Outbound call to mariluz Garrett to complete screening, but no answer and no VM set up. Outbound call to Ramon/Darvin who confirms patient is from a LTC bed with a medicaid bed hold and the facility is following for return. Requested assistance from Ramon with locating [facility] contact information. Sue/CCP manager and Jeri/CCP notified. Nell URBAN RN/CCP              Continued Care and Services - Admitted Since 5/4/2023     Destination     Service Provider Request Status Selected Services Address Phone Fax Patient Preferred    [facility] Accepted N/A [address] [facility] [id number] [phone] --                 Demographic Summary    No documentation.                Functional Status    No documentation.                Psychosocial    No documentation.                Abuse/Neglect    No documentation.                Legal    No documentation.                Substance Abuse    No documentation.                Patient Forms    No documentation.                   Tammy Lacy

## 2023-05-04 NOTE — PROGRESS NOTES
"[facility] Clinical Pharmacy Services: Vancomycin Pharmacokinetic Initial Consult Note    Shama Cruz is a 68 y.o. female who is on day 1 of 7 of pharmacy to dose vancomycin.    Indication: Sepsis  Consulting Provider: Dr. Munoz  Planned Duration of Therapy: 7 days   Loading Dose Ordered or Given: 1750 mg on 5/4 at 0600  MRSA PCR performed: N/A; Result: N/A  Culture/Source: Blood Cx in process   Target: -600 mg/L.hr   Other Antimicrobials: Zosyn 3.375 grams Q 8 hours    Vitals/Labs  Ht: 162.6 cm (64\"); Wt: 90.7 kg (200 lb)  Temp Readings from Last 1 Encounters:   05/04/23 97.8 °F (36.6 °C) (Axillary)    Estimated Creatinine Clearance: 70.9 mL/min (by C-G formula based on SCr of 0.84 mg/dL).       Results from last 7 days   Lab Units 05/04/23  0416   CREATININE mg/dL 0.84   WBC 10*3/mm3 10.42     Assessment/Plan:    Vancomycin Dose:   1000 mg IV every  12  hours  Predictive AUC level for the dose ordered is 533 mg/L.hr, which is within the target of 400-600 mg/L.hr  Vanc Trough has been ordered for 5/5 with morning labs before the morning dose that is due at 0600.      Pharmacy will follow patient's kidney function and will adjust doses and obtain levels as necessary. Thank you for involving pharmacy in this patient's care. Please contact pharmacy with any questions or concerns.                           Shayy Escobar, Pharmacy Intern    "

## 2023-05-04 NOTE — NURSING NOTE
Cwon consult received. I reviewed patient's chart and admission wound photos. There was no photo loaded yet for possible heel wound. Ms. Cruz resides at a facility and wounds were present on admission. Based on the photos I reviewed I will add wound care orders and prevention measure orders to Epic as I was unable to assess wounds in person.  Our dept will follow up tomorrow for wound assessment and ensure wound care in place is appropriate.

## 2023-05-05 ENCOUNTER — APPOINTMENT (OUTPATIENT)
Dept: GENERAL RADIOLOGY | Facility: HOSPITAL | Age: 68
End: 2023-05-05
Payer: MEDICARE

## 2023-05-05 LAB
ANION GAP SERPL CALCULATED.3IONS-SCNC: 9 MMOL/L (ref 5–15)
ARTERIAL PATENCY WRIST A: POSITIVE
ATMOSPHERIC PRESS: 750.2 MMHG
ATMOSPHERIC PRESS: 752.1 MMHG
ATMOSPHERIC PRESS: 752.5 MMHG
BASE EXCESS BLDA CALC-SCNC: -2.2 MMOL/L (ref 0–2)
BASE EXCESS BLDA CALC-SCNC: -2.8 MMOL/L (ref 0–2)
BASE EXCESS BLDA CALC-SCNC: -3.9 MMOL/L (ref 0–2)
BASOPHILS # BLD AUTO: 0.03 10*3/MM3 (ref 0–0.2)
BASOPHILS NFR BLD AUTO: 0.3 % (ref 0–1.5)
BDY SITE: ABNORMAL
BUN SERPL-MCNC: 25 MG/DL (ref 8–23)
BUN/CREAT SERPL: 50 (ref 7–25)
CALCIUM SPEC-SCNC: 7 MG/DL (ref 8.6–10.5)
CHLORIDE SERPL-SCNC: 112 MMOL/L (ref 98–107)
CO2 SERPL-SCNC: 21 MMOL/L (ref 22–29)
CREAT SERPL-MCNC: 0.5 MG/DL (ref 0.57–1)
D-LACTATE SERPL-SCNC: 2.7 MMOL/L (ref 0.5–2)
D-LACTATE SERPL-SCNC: 2.7 MMOL/L (ref 0.5–2)
DEPRECATED RDW RBC AUTO: 50.8 FL (ref 37–54)
EGFRCR SERPLBLD CKD-EPI 2021: 102.9 ML/MIN/1.73
EOSINOPHIL # BLD AUTO: 0.18 10*3/MM3 (ref 0–0.4)
EOSINOPHIL NFR BLD AUTO: 1.6 % (ref 0.3–6.2)
ERYTHROCYTE [DISTWIDTH] IN BLOOD BY AUTOMATED COUNT: 14 % (ref 12.3–15.4)
GLUCOSE BLDC GLUCOMTR-MCNC: 226 MG/DL (ref 70–130)
GLUCOSE BLDC GLUCOMTR-MCNC: 232 MG/DL (ref 70–130)
GLUCOSE BLDC GLUCOMTR-MCNC: 249 MG/DL (ref 70–130)
GLUCOSE BLDC GLUCOMTR-MCNC: 282 MG/DL (ref 70–130)
GLUCOSE SERPL-MCNC: 189 MG/DL (ref 65–99)
HCO3 BLDA-SCNC: 20.3 MMOL/L (ref 22–28)
HCO3 BLDA-SCNC: 21.3 MMOL/L (ref 22–28)
HCO3 BLDA-SCNC: 22.1 MMOL/L (ref 22–28)
HCT VFR BLD AUTO: 28.4 % (ref 34–46.6)
HGB BLD-MCNC: 9.5 G/DL (ref 12–15.9)
INHALED O2 CONCENTRATION: 25 %
LYMPHOCYTES # BLD AUTO: 1.64 10*3/MM3 (ref 0.7–3.1)
LYMPHOCYTES NFR BLD AUTO: 14.1 % (ref 19.6–45.3)
MAGNESIUM SERPL-MCNC: 2 MG/DL (ref 1.6–2.4)
MCH RBC QN AUTO: 33.2 PG (ref 26.6–33)
MCHC RBC AUTO-ENTMCNC: 33.5 G/DL (ref 31.5–35.7)
MCV RBC AUTO: 99.3 FL (ref 79–97)
MODALITY: ABNORMAL
MONOCYTES # BLD AUTO: 0.63 10*3/MM3 (ref 0.1–0.9)
MONOCYTES NFR BLD AUTO: 5.4 % (ref 5–12)
NEUTROPHILS NFR BLD AUTO: 78.1 % (ref 42.7–76)
NEUTROPHILS NFR BLD AUTO: 9.07 10*3/MM3 (ref 1.7–7)
O2 A-A PPRESDIFF RESPIRATORY: 0.6 MMHG
O2 A-A PPRESDIFF RESPIRATORY: 0.7 MMHG
O2 A-A PPRESDIFF RESPIRATORY: 0.7 MMHG
PCO2 BLDA: 32.8 MM HG (ref 35–45)
PCO2 BLDA: 33 MM HG (ref 35–45)
PCO2 BLDA: 35.1 MM HG (ref 35–45)
PEEP RESPIRATORY: 5 CM[H2O]
PH BLDA: 7.4 PH UNITS (ref 7.35–7.45)
PH BLDA: 7.41 PH UNITS (ref 7.35–7.45)
PH BLDA: 7.42 PH UNITS (ref 7.35–7.45)
PLATELET # BLD AUTO: 85 10*3/MM3 (ref 140–450)
PMV BLD AUTO: 14.6 FL (ref 6–12)
PO2 BLDA: 101.7 MM HG (ref 80–100)
PO2 BLDA: 92.8 MM HG (ref 80–100)
PO2 BLDA: 93.8 MM HG (ref 80–100)
POTASSIUM SERPL-SCNC: 4.1 MMOL/L (ref 3.5–5.2)
PSV: 7 CMH2O
QT INTERVAL: 453 MS
RBC # BLD AUTO: 2.86 10*6/MM3 (ref 3.77–5.28)
SAO2 % BLDCOA: 97.3 % (ref 92–99)
SAO2 % BLDCOA: 97.4 % (ref 92–99)
SAO2 % BLDCOA: 98 % (ref 92–99)
SET MECH RESP RATE: 18
SET MECH RESP RATE: 18
SET MECH RESP RATE: 23
SODIUM SERPL-SCNC: 142 MMOL/L (ref 136–145)
TOTAL RATE: 18 BREATHS/MINUTE
TOTAL RATE: 23 BREATHS/MINUTE
VANCOMYCIN TROUGH SERPL-MCNC: 15.7 MCG/ML (ref 5–20)
VENTILATOR MODE: ABNORMAL
VT ON VENT VENT: 435 ML
VT ON VENT VENT: 500 ML
VT ON VENT VENT: 500 ML
WBC NRBC COR # BLD: 11.61 10*3/MM3 (ref 3.4–10.8)

## 2023-05-05 PROCEDURE — 71045 X-RAY EXAM CHEST 1 VIEW: CPT

## 2023-05-05 PROCEDURE — 94799 UNLISTED PULMONARY SVC/PX: CPT

## 2023-05-05 PROCEDURE — 80048 BASIC METABOLIC PNL TOTAL CA: CPT | Performed by: INTERNAL MEDICINE

## 2023-05-05 PROCEDURE — 82948 REAGENT STRIP/BLOOD GLUCOSE: CPT

## 2023-05-05 PROCEDURE — 83605 ASSAY OF LACTIC ACID: CPT | Performed by: EMERGENCY MEDICINE

## 2023-05-05 PROCEDURE — 25010000002 PROPOFOL 10 MG/ML EMULSION: Performed by: INTERNAL MEDICINE

## 2023-05-05 PROCEDURE — 63710000001 INSULIN REGULAR HUMAN PER 5 UNITS: Performed by: INTERNAL MEDICINE

## 2023-05-05 PROCEDURE — 80202 ASSAY OF VANCOMYCIN: CPT | Performed by: INTERNAL MEDICINE

## 2023-05-05 PROCEDURE — 25010000002 HEPARIN (PORCINE) PER 1000 UNITS: Performed by: INTERNAL MEDICINE

## 2023-05-05 PROCEDURE — 93010 ELECTROCARDIOGRAM REPORT: CPT | Performed by: INTERNAL MEDICINE

## 2023-05-05 PROCEDURE — 94002 VENT MGMT INPAT INIT DAY: CPT

## 2023-05-05 PROCEDURE — 25010000002 VANCOMYCIN PER 500 MG: Performed by: INTERNAL MEDICINE

## 2023-05-05 PROCEDURE — 94761 N-INVAS EAR/PLS OXIMETRY MLT: CPT

## 2023-05-05 PROCEDURE — 93005 ELECTROCARDIOGRAM TRACING: CPT | Performed by: INTERNAL MEDICINE

## 2023-05-05 PROCEDURE — 0BH17EZ INSERTION OF ENDOTRACHEAL AIRWAY INTO TRACHEA, VIA NATURAL OR ARTIFICIAL OPENING: ICD-10-PCS | Performed by: INTERNAL MEDICINE

## 2023-05-05 PROCEDURE — 85025 COMPLETE CBC W/AUTO DIFF WBC: CPT | Performed by: INTERNAL MEDICINE

## 2023-05-05 PROCEDURE — 82803 BLOOD GASES ANY COMBINATION: CPT

## 2023-05-05 PROCEDURE — 25010000002 FENTANYL CITRATE (PF) 50 MCG/ML SOLUTION: Performed by: INTERNAL MEDICINE

## 2023-05-05 PROCEDURE — 94003 VENT MGMT INPAT SUBQ DAY: CPT

## 2023-05-05 PROCEDURE — 83735 ASSAY OF MAGNESIUM: CPT | Performed by: INTERNAL MEDICINE

## 2023-05-05 PROCEDURE — 36600 WITHDRAWAL OF ARTERIAL BLOOD: CPT

## 2023-05-05 PROCEDURE — 25010000002 PIPERACILLIN SOD-TAZOBACTAM PER 1 G: Performed by: INTERNAL MEDICINE

## 2023-05-05 RX ORDER — NOREPINEPHRINE BITARTRATE 0.03 MG/ML
.02-.3 INJECTION, SOLUTION INTRAVENOUS
Status: DISCONTINUED | OUTPATIENT
Start: 2023-05-05 | End: 2023-05-09

## 2023-05-05 RX ORDER — FENTANYL CITRATE 50 UG/ML
100 INJECTION, SOLUTION INTRAMUSCULAR; INTRAVENOUS ONCE
Status: COMPLETED | OUTPATIENT
Start: 2023-05-05 | End: 2023-05-05

## 2023-05-05 RX ORDER — CASTOR OIL AND BALSAM, PERU 788; 87 MG/G; MG/G
1 OINTMENT TOPICAL EVERY 12 HOURS SCHEDULED
Status: DISCONTINUED | OUTPATIENT
Start: 2023-05-05 | End: 2023-05-17 | Stop reason: HOSPADM

## 2023-05-05 RX ORDER — PROPOFOL 10 MG/ML
100 VIAL (ML) INTRAVENOUS ONCE
Status: COMPLETED | OUTPATIENT
Start: 2023-05-05 | End: 2023-05-05

## 2023-05-05 RX ORDER — DEXMEDETOMIDINE HYDROCHLORIDE 4 UG/ML
.2-1.5 INJECTION, SOLUTION INTRAVENOUS
Status: DISCONTINUED | OUTPATIENT
Start: 2023-05-05 | End: 2023-05-09

## 2023-05-05 RX ADMIN — CASTOR OIL AND BALSAM, PERU 1 APPLICATION: 788; 87 OINTMENT TOPICAL at 08:32

## 2023-05-05 RX ADMIN — TAZOBACTAM SODIUM AND PIPERACILLIN SODIUM 3.38 G: 375; 3 INJECTION, SOLUTION INTRAVENOUS at 02:55

## 2023-05-05 RX ADMIN — INSULIN HUMAN 3 UNITS: 100 INJECTION, SOLUTION PARENTERAL at 12:10

## 2023-05-05 RX ADMIN — HEPARIN SODIUM 5000 UNITS: 5000 INJECTION INTRAVENOUS; SUBCUTANEOUS at 21:25

## 2023-05-05 RX ADMIN — VANCOMYCIN HYDROCHLORIDE 1000 MG: 1 INJECTION, SOLUTION INTRAVENOUS at 17:35

## 2023-05-05 RX ADMIN — NYSTATIN: 100000 POWDER TOPICAL at 08:32

## 2023-05-05 RX ADMIN — FAMOTIDINE 20 MG: 10 INJECTION INTRAVENOUS at 08:32

## 2023-05-05 RX ADMIN — DEXMEDETOMIDINE HYDROCHLORIDE 0.2 MCG/KG/HR: 4 INJECTION, SOLUTION INTRAVENOUS at 14:57

## 2023-05-05 RX ADMIN — CASTOR OIL AND BALSAM, PERU 1 APPLICATION: 788; 87 OINTMENT TOPICAL at 21:27

## 2023-05-05 RX ADMIN — Medication 10 ML: at 21:25

## 2023-05-05 RX ADMIN — ANORECTAL OINTMENT 1 APPLICATION: 15.7; .44; 24; 20.6 OINTMENT TOPICAL at 12:11

## 2023-05-05 RX ADMIN — FAMOTIDINE 20 MG: 10 INJECTION INTRAVENOUS at 21:25

## 2023-05-05 RX ADMIN — VANCOMYCIN HYDROCHLORIDE 1000 MG: 1 INJECTION, SOLUTION INTRAVENOUS at 07:45

## 2023-05-05 RX ADMIN — ANORECTAL OINTMENT 1 APPLICATION: 15.7; .44; 24; 20.6 OINTMENT TOPICAL at 21:26

## 2023-05-05 RX ADMIN — SODIUM CHLORIDE 125 ML/HR: 4.5 INJECTION, SOLUTION INTRAVENOUS at 09:25

## 2023-05-05 RX ADMIN — CASTOR OIL AND BALSAM, PERU 1 APPLICATION: 788; 87 OINTMENT TOPICAL at 21:25

## 2023-05-05 RX ADMIN — CHLORHEXIDINE GLUCONATE 0.12% ORAL RINSE 15 ML: 1.2 LIQUID ORAL at 21:25

## 2023-05-05 RX ADMIN — SODIUM CHLORIDE 250 ML: 9 INJECTION, SOLUTION INTRAVENOUS at 02:33

## 2023-05-05 RX ADMIN — Medication 10 ML: at 08:32

## 2023-05-05 RX ADMIN — PROPOFOL 100 MG: 10 INJECTION, EMULSION INTRAVENOUS at 11:21

## 2023-05-05 RX ADMIN — CHLORHEXIDINE GLUCONATE 0.12% ORAL RINSE 15 ML: 1.2 LIQUID ORAL at 08:32

## 2023-05-05 RX ADMIN — INSULIN HUMAN 3 UNITS: 100 INJECTION, SOLUTION PARENTERAL at 17:35

## 2023-05-05 RX ADMIN — MUPIROCIN 1 APPLICATION: 20 OINTMENT TOPICAL at 21:26

## 2023-05-05 RX ADMIN — TAZOBACTAM SODIUM AND PIPERACILLIN SODIUM 3.38 G: 375; 3 INJECTION, SOLUTION INTRAVENOUS at 19:03

## 2023-05-05 RX ADMIN — SODIUM CHLORIDE 125 ML/HR: 4.5 INJECTION, SOLUTION INTRAVENOUS at 01:13

## 2023-05-05 RX ADMIN — HEPARIN SODIUM 5000 UNITS: 5000 INJECTION INTRAVENOUS; SUBCUTANEOUS at 13:15

## 2023-05-05 RX ADMIN — FENTANYL CITRATE 50 MCG: 50 INJECTION, SOLUTION INTRAMUSCULAR; INTRAVENOUS at 11:09

## 2023-05-05 RX ADMIN — TAZOBACTAM SODIUM AND PIPERACILLIN SODIUM 3.38 G: 375; 3 INJECTION, SOLUTION INTRAVENOUS at 10:55

## 2023-05-05 RX ADMIN — HEPARIN SODIUM 5000 UNITS: 5000 INJECTION INTRAVENOUS; SUBCUTANEOUS at 06:02

## 2023-05-05 RX ADMIN — MUPIROCIN 1 APPLICATION: 20 OINTMENT TOPICAL at 13:15

## 2023-05-05 RX ADMIN — NYSTATIN: 100000 POWDER TOPICAL at 21:26

## 2023-05-05 RX ADMIN — INSULIN HUMAN 3 UNITS: 100 INJECTION, SOLUTION PARENTERAL at 06:22

## 2023-05-05 RX ADMIN — CASTOR OIL AND BALSAM, PERU 1 APPLICATION: 788; 87 OINTMENT TOPICAL at 12:10

## 2023-05-05 NOTE — PLAN OF CARE
Pt remains in CICU. Extubated this morning. Reintubated within 5 min due to inability to protect airway and reduced alertness. On 0.2 mcg/kg/hr precedex. Pt was getting hypotensive on higher dose. FMS in place and wound care completed per orders.

## 2023-05-05 NOTE — PROGRESS NOTES
"  Daily Progress Note.   [facility] CARDIAC INTENSIVE CARE  5/5/2023    Patient:  Name:  Shama Cruz  MRN:  [id number]  1955  68 y.o.  female         CC: Unresponsive sepsis on mechanical ventilation    Interval History:  On mechanical ventilation.  No acute events overnight.  Fever curve down  Int hypotension  On vent will follow command left hand, not on right side of body    Physical Exam:  /52   Pulse 87   Temp 99.1 °F (37.3 °C)   Resp (!) 30   Ht 162.6 cm (64.02\")   Wt 68.4 kg (150 lb 12.7 oz)   SpO2 100%   BMI 25.87 kg/m²   Body mass index is 25.87 kg/m².    Intake/Output Summary (Last 24 hours) at 5/5/2023 0816  Last data filed at 5/5/2023 0600  Gross per 24 hour   Intake 5194.76 ml   Output 1375 ml   Net 3819.76 ml   Vent Settings          Resp Rate (Set): 18  Pressure Support (cm H2O): 7 cm H20  FiO2 (%): 25 %  PEEP/CPAP (cm H2O): 5 cm H20    Minute Ventilation (L/min) (Obs): 13.2 L/min  Resp Rate (Observed) Vent: 23     I:E Ratio (Obs): 1:2.1    PIP Observed (cm H2O): 13 cm H2O  Plateau Pressure (cm H2O): 0 cm H2O  Driving Pressure (cm H2O): -4.5 cm H2O    General appearance: On mechanical ventilation  Eyes: anicteric sclerae, moist conjunctivae; no lidlag;    HENT: Atraumatic; oropharynx limited secondary to endotracheal tube  Lungs: Bilateral air entry, with synchronous respiratory effort and no intercostal retractions  CV: RRR, no rub   Abdomen: Obese bowel sounds are present nonrigid PEG tube in place without purulence  Extremities: No peripheral edema positive chronic changes to lower extremities with multiple wounds with Mepilex on present upon arrival  Skin: WWP no diffuse visible rash  Psych/neuro alert awake  On vent will follow command left hand, not on right side of body    Data Review:  Notable Labs:  Results from last 7 days   Lab Units 05/05/23  0358 05/04/23  0416   WBC 10*3/mm3 11.61* 10.42   HEMOGLOBIN g/dL 9.5* 11.0*   PLATELETS 10*3/mm3 85* 98* "     Results from last 7 days   Lab Units 05/05/23  0413 05/04/23  1226 05/04/23  0830 05/04/23  0416   SODIUM mmol/L 142 150* 150* 149*   POTASSIUM mmol/L 4.1 3.0* 3.5 5.0   CHLORIDE mmol/L 112* 113* 115* 111*   CO2 mmol/L 21.0* 23.7 24.0 24.6   BUN mg/dL 25* 31* 34* 42*   CREATININE mg/dL 0.50* 0.54* 0.66 0.84   GLUCOSE mg/dL 189* 169* 187* 195*   CALCIUM mg/dL 7.0* 7.9* 8.1* 9.0   MAGNESIUM mg/dL 2.0  --   --   --    PHOSPHORUS mg/dL  --  2.7  --   --    Estimated Creatinine Clearance: 103.8 mL/min (A) (by C-G formula based on SCr of 0.5 mg/dL (L)).    Results from last 7 days   Lab Units 05/05/23  0610 05/05/23  0358 05/04/23  2328 05/04/23  1655 05/04/23  0830 05/04/23  0416   AST (SGOT) U/L  --   --   --   --   --  36*   ALT (SGPT) U/L  --   --   --   --   --  14   PROCALCITONIN ng/mL  --   --   --   --   --  0.33*   LACTATE mmol/L 2.7*  --  2.7* 3.6*   < > 5.1*   PLATELETS 10*3/mm3  --  85*  --   --   --  98*    < > = values in this interval not displayed.       Results from last 7 days   Lab Units 05/05/23  0804 05/05/23  0328 05/04/23  0304   PH, ARTERIAL pH units 7.407 7.400 7.373   PCO2, ARTERIAL mm Hg 35.1 32.8* 51.0*   PO2 ART mm Hg 93.8 92.8 463.2*   HCO3 ART mmol/L 22.1 20.3* 29.7*       Imaging:  Reviewed chest images personally from past 3 days  CT scan showing multiple chronic strokes no acute pathology.  Ct abd pelvis no infectious etiology      ASSESSMENT  /  PLAN:  History of prior stroke  Acute hypoxemic respiratory failure  Sepsis  Pneumonia  UTI - ecoli await sensitivities from culture E. coli growing  MRSA swab positive however patient has no infiltrate  Lactic acidosis  Mild hyponatremia  Diabetes  Dementia  Management of mechanical ventilation.  Thrombocytopenia  Status post PEG  3 cm indeterminate right adrenal nodule. - outpt follow up     Improving ventilator looks encouraging.  Attempt extubation this morning SBT and ABG reviewed discussed with respiratory therapist as well as  nurse.  Hold sedative medications.  Continue on Zosyn for now follow blood cultures.  Free water through PEG tube stop half-normal saline.   On x-ray no infiltrate no indwelling catheters.  Observe fever curve. Cont to treat uti, if recurrent fevers needs scan abd pelvis  Monitor platelets.  No active bleeding.    Discussed with multidiscipline team on morning rounds.    Total critical care time was 35minutes, excluding any separately billable procedure time.  Time did not overlap with any other provider.    Rubén Rasmussen MD  [facility]  05/05/23  10:37 EDT

## 2023-05-05 NOTE — SIGNIFICANT NOTE
05/05/23 0804   Spontaneous Breathing Trial   $ SBT Readiness to Wean yes   Vt Spontaneous (mL) 435 mL   Effort (VC) good   RSBI 54   Negative Inspiratory Force (cm H2O) 20   Effort (NIF) fair   Resp Rate (Obs) 23

## 2023-05-05 NOTE — PROGRESS NOTES
Not clearing secretions with increased resp effort  Will proceed with re-intubation    Rubén Rasmussen MD  [facility]  05/05/23  11:50 EDT

## 2023-05-05 NOTE — PLAN OF CARE
Goal Outcome Evaluation:         Pt remains on ventilator. Follows simple commands intermittently . Responsive to pain. One episode of BP soft and MAP less than 65 while HR was in 60's with mult. Ectopy. Rhythm flipped into junctional rhythms but she came out of it when Precedex was turned off. Then HR went back to 80s, BP stable. Multiple loose BM's. VSS for the remainder of the night.

## 2023-05-05 NOTE — CASE MANAGEMENT/SOCIAL WORK
Continued Stay Note  [facility]     Patient Name: Shama Cruz  MRN: [id number]  Today's Date: 5/5/2023    Admit Date: 5/4/2023    Plan: Return to LTC bed at [facility]. Facility pursuing guardianship   Discharge Plan     Row Name 05/05/23 1104       Plan    Plan Return to LTC bed at [facility]. Facility pursuing guardianship    Plan Comments CCP notified by [facility] that the facility has a guardianship hearing scheduled for June 7 as patient has no NOK. CCP following clinical course to assist with any discharge planning needs. Jeri/CCP aware. Nell URBAN RN/CCP               Discharge Codes    No documentation.                     Tammy Lacy

## 2023-05-05 NOTE — PROCEDURES
Endotracheal Intubation Procedure Note      Indication for endotracheal intubation: airway compromise and respiratory failure.  Sedation: propofol 100mg.  Equipment: A video GlideScope was used and Yasmeen 3 laryngoscope blade and 7.5mm cuffed endotracheal tube.  Number of attempts: 1.  ETT location confirmed by auscultation, ETCO2 monitor and cxr ordered pending.         Rubén Rasmussen MD  5/5/2023 5/5/2023

## 2023-05-05 NOTE — NURSING NOTE
05/05/23 0918   Wound 05/05/23 0918 coccyx Pressure Injury   Placement Date/Time: 05/05/23 0918   Present on Hospital Admission: Yes  Location: coccyx  Primary Wound Type: Pressure Injury   Pressure Injury Stage DTPI   Dressing Appearance moist drainage   Base non-blanchable;maroon/purple;moist  (open blister noted)   Periwound excoriated;redness;swelling   Periwound Temperature warm   Periwound Skin Turgor soft   Drainage Characteristics/Odor serosanguineous   Drainage Amount scant   Wound 05/05/23 0918 Right ear Pressure Injury   Placement Date/Time: 05/05/23 0918   Present on Hospital Admission: Yes  Side: Right  Location: ear  Primary Wound Type: Pressure Injury   Pressure Injury Stage 2   Base moist   Periwound blanchable;swelling;redness   Periwound Temperature warm   Periwound Skin Turgor soft   Edges open   Drainage Characteristics/Odor serous   Drainage Amount scant   Wound 05/05/23 0918 Left heel Pressure Injury   Placement Date/Time: 05/05/23 0918   Present on Hospital Admission: Yes  Side: Left  Location: heel  Primary Wound Type: Pressure Injury   Pressure Injury Stage DTPI   Dressing Appearance intact;dry   Base non-blanchable;maroon/purple   Periwound intact;redness;swelling   Periwound Temperature warm   Periwound Skin Turgor soft     Wound/Ostomy: Consult received regarding skin issue on Coccyx, rt Ear, and left Heel. Patient on ventilation, Rn at bed side. Upon assessment we could see purple discoloration, persistent not blanchable area on Coccyx/Buttocks  site, DTI evolving into an open blister and DTI with intact skin yo left Heel. In addition on behind  rt Ear is observed PI stage 2.  Wound care order placed into Epic.   Patient will need low air loss mattress upon transfer out of this unit.   Please re-consult for any additional needs.

## 2023-05-05 NOTE — PROGRESS NOTES
[facility] Clinical Pharmacy Services: Vancomycin Monitoring Note    Shama Cruz is a 68 y.o. female who is on day 2 of pharmacy to dose vancomycin for Sepsis.    Updated Cultures and Sensitivities:   5/4 MRSA nares: (+)   5/4 Blood Cx: NG24H   5/4 Urine: pending     Vancomycin Level  Results from last 7 days   Lab Units 05/05/23  0610   VANCOMYCIN TR mcg/mL 15.70       Previous Vancomycin Dose:  1000mg Q12H  Calculated AUC on previous dose: 521 mg/L.hr    Renal Function  Results from last 7 days   Lab Units 05/05/23  0413 05/05/23  0358 05/04/23  1226 05/04/23  0830 05/04/23  0416   CREATININE mg/dL 0.50*  --  0.54* 0.66 0.84   WBC 10*3/mm3  --  11.61*  --   --  10.42       Assessment/Plan    Caluclated AUC based on vancomycin levels is 521 mg/L.hr (within of goal range of 400-600 mg/L.hr)  Continue 1000 mg IV every  12  hours  No repeat levels at this time ordered. Subsequent levels to be ordered based on therapy plans and changes in clinical status and/or renal function   We will continue to monitor patient changes and renal function     Thank you for involving pharmacy in this patient's care. Please contact pharmacy with any questions or concerns.       Thank you,   Shayy Zhao, PharmD  Clinical Pharmacist

## 2023-05-05 NOTE — PROGRESS NOTES
"Nutrition Services    Patient Name:  Shama Cruz  YOB: 1955  MRN: [id number]  Admit Date:  5/4/2023  Assessment Date:  05/05/23    CLINICAL NUTRITION ASSESSMENT    Comments: TF follow up    Pt tolerating Diabetisource AC @ 40ml/hr. IV fluids discontinued and MD increased free water to 50ml q 2h.     Current labs: Na 142, K 4.1, Cl 112, Glu 226, BUN 25 (trending down).  Skin: DTPI to coccyx and L heel, stg II PI to R ear. RN placed FMS. Pt was extubated this morning but then required reintubation. Sedated with Precedex.   Bed scale weight obtained, 150# (68.4kg). Estimated nutritional needs recalculated d/t large change from estimated weight of 200# on admission.     Plan/Recommend:  1. Increase goal rate of Diabetisource AC to 65ml/hr. Provides 1872 kcal, 94g protein, 1279ml free water. Free water 50ml q 2h provides additional 600ml fluid.     2. Add Gurmeet BID to support wound healing.     3. Monitor labs, tolerance, bowel function.     RD to follow clinical course and make further recommendations as warranted.     Encounter Information         Reason for Encounter TF follow up    Admitting Diagnosis Sepsis, acute hypoxic respiratory failure, hypernatremia, dementia, hx/o diabetes    Pertinent Medical History Pt from a nursing home. Intubated en route to [facility]. Urine noted to be cloudy upon presentation to ED.     Current Issues Intubated, AMS. PEG tube in place.      Current Nutrition Orders & Evaluation of Intake       Oral Nutrition     Food Allergies NFFA   Current PO Diet NPO Diet NPO Type: Strict NPO   Supplement    PO Evaluation NPO/no intake     % PO Intake    --  Anthropometrics          Height    Weight Height: 162.6 cm (64.02\")  Weight: 68.4 kg (150 lb 12.7 oz) (05/05/23 0600)    BMI kg/m2 Body mass index is 25.87 kg/m².    BMI Category Overweight (25 - 29.9)    Weight History  Wt Readings from Last 15 Encounters:   05/05/23 0600 68.4 kg (150 lb 12.7 oz)   05/04/23 2236 68.4 kg (150 lb " 12.7 oz)   05/04/23 0454 90.7 kg (200 lb)        Estimated/Assessed Needs        Energy Requirements    Weight for Calculation 68.4kg   Method for Estimation  25 kcal/kg, 30 kcal/kg   EST Needs (kcal/day) 9732-0838        Protein Requirements    Weight for Calculation 68.4kg    EST Protein Needs (g/kg) 1.2 - 1.5 gm/kg   EST Daily Needs (g/day) 82-103g        Fluid Requirements     Method for Estimation 1 mL/kcal    Estimated Needs (mL/day) 1710         Physical Findings          Physical Appearance obese, overweight, sedate, ventilator support   Oral/Mouth Cavity tooth or teeth missing   Edema  generalized, 1+ (trace)   Gastrointestinal last bowel movement: 5/5 liquid   Skin  skin intact   Tubes/Drains/Lines PEG   NFPE No clinical signs of muscle wasting or fat loss (only visualized from chest up)     Labs        Pertinent Labs Reviewed, listed below     Results from last 7 days   Lab Units 05/05/23 0413 05/04/23  1226 05/04/23  0830 05/04/23  0416   SODIUM mmol/L 142 150* 150* 149*   POTASSIUM mmol/L 4.1 3.0* 3.5 5.0   CHLORIDE mmol/L 112* 113* 115* 111*   CO2 mmol/L 21.0* 23.7 24.0 24.6   BUN mg/dL 25* 31* 34* 42*   CREATININE mg/dL 0.50* 0.54* 0.66 0.84   CALCIUM mg/dL 7.0* 7.9* 8.1* 9.0   BILIRUBIN mg/dL  --   --   --  0.3   ALK PHOS U/L  --   --   --  39   ALT (SGPT) U/L  --   --   --  14   AST (SGOT) U/L  --   --   --  36*   GLUCOSE mg/dL 189* 169* 187* 195*     Results from last 7 days   Lab Units 05/05/23 0413 05/05/23 0358 05/04/23  1226   MAGNESIUM mg/dL 2.0  --   --    PHOSPHORUS mg/dL  --   --  2.7   HEMOGLOBIN g/dL  --  9.5*  --    HEMATOCRIT %  --  28.4*  --    WBC 10*3/mm3  --  11.61*  --    ALBUMIN g/dL  --   --  3.1*     Results from last 7 days   Lab Units 05/05/23 0358 05/04/23  0416   PLATELETS 10*3/mm3 85* 98*     No results found for: COVID19  No results found for: HGBA1C       Medications            Scheduled Medications acetaminophen, 1,000 mg, Oral, Once  castor oil-balsam peru, 1  application, Topical, Q12H  castor oil-balsam peru, 1 application, Topical, Q12H  chlorhexidine, 15 mL, Mouth/Throat, Q12H  famotidine, 20 mg, Intravenous, BID  heparin (porcine), 5,000 Units, Subcutaneous, Q8H  insulin regular, 2-7 Units, Subcutaneous, Q6H  Menthol-Zinc Oxide, 1 application, Topical, BID  mupirocin, 1 application, Topical, Q12H  nystatin, , Topical, Q12H  piperacillin-tazobactam, 3.375 g, Intravenous, Q8H  sodium chloride, 30 mL/kg, Intravenous, Once  sodium chloride, 500 mL, Intravenous, Once  sodium chloride, 10 mL, Intravenous, Q12H  vancomycin, 1,000 mg, Intravenous, Q12H        Infusions dexmedetomidine, 0.2-1.5 mcg/kg/hr, Last Rate: 0.2 mcg/kg/hr (05/05/23 1121)  norepinephrine, 0.02-0.3 mcg/kg/min  Pharmacy to dose vancomycin,         PRN Medications •  dextrose  •  dextrose  •  glucagon (human recombinant)  •  Pharmacy to dose vancomycin  •  potassium chloride **OR** potassium chloride **OR** potassium chloride  •  sodium chloride  •  sodium chloride     PES STATEMENT / NUTRITION DIAGNOSIS      Nutrition Dx Problem  Problem: Needs Alternative Route  Etiology: Medical Diagnosis Dysphagia, Resp failure/vent  Signs/Symptoms: NPO    Comment: PEG tube in place     PLAN OF CARE  Intervention Goal        Intervention Goal(s) Nutrition support treatment, Meet estimated needs, Disease management/therapy, Initiate TF/PN, Tolerate TF/PN at goal and No significant weight loss     Nutrition Intervention         RD Action Follow Tx Progress, Care plan reviewed and Recommend/ordered:      Nutrition Prescription          Recommendation       Enteral Prescription:     Enteral Route PEG    TF Delivery Method Continuous    Enteral Product Diabetisource AC    Modular Gurmeet, BID    Propofol Rate/Kcal -    TF Start Rate (mL/hr) 40    TF Goal Rate (mL/hr) 65    Free Water Flush (mL) 50ml q 2h    TF Provision at Goal:  1872kcal, 94 gm protein, 1279 mL free water + 600 mL water flushes         Calories 100 % needs  met         Protein  100 % needs met         Fluid (mL) 1879 mL total     Prescription Ordered Yes     Monitor/Evaluation        Monitor Per protocol     RD to follow up per protocol.    Electronically signed by:  Isabel Weinstein RD  05/05/23 11:40 EDT

## 2023-05-05 NOTE — SIGNIFICANT NOTE
05/05/23 0645   Readiness to Wean Daily Screen   Daily Screen Complete Y   Daily Screen Outcome pass  (placed on PS 7 for SBT)

## 2023-05-06 ENCOUNTER — APPOINTMENT (OUTPATIENT)
Dept: GENERAL RADIOLOGY | Facility: HOSPITAL | Age: 68
End: 2023-05-06
Payer: MEDICARE

## 2023-05-06 LAB
ANION GAP SERPL CALCULATED.3IONS-SCNC: 10.4 MMOL/L (ref 5–15)
ARTERIAL PATENCY WRIST A: POSITIVE
ATMOSPHERIC PRESS: 752.7 MMHG
BACTERIA SPEC AEROBE CULT: ABNORMAL
BASE EXCESS BLDA CALC-SCNC: -0.3 MMOL/L (ref 0–2)
BASOPHILS # BLD AUTO: 0.02 10*3/MM3 (ref 0–0.2)
BASOPHILS NFR BLD AUTO: 0.2 % (ref 0–1.5)
BDY SITE: ABNORMAL
BUN SERPL-MCNC: 17 MG/DL (ref 8–23)
BUN/CREAT SERPL: 44.7 (ref 7–25)
CALCIUM SPEC-SCNC: 6.9 MG/DL (ref 8.6–10.5)
CHLORIDE SERPL-SCNC: 107 MMOL/L (ref 98–107)
CO2 SERPL-SCNC: 19.6 MMOL/L (ref 22–29)
CREAT SERPL-MCNC: 0.38 MG/DL (ref 0.57–1)
DEPRECATED RDW RBC AUTO: 48.1 FL (ref 37–54)
EGFRCR SERPLBLD CKD-EPI 2021: 110 ML/MIN/1.73
EOSINOPHIL # BLD AUTO: 0.25 10*3/MM3 (ref 0–0.4)
EOSINOPHIL NFR BLD AUTO: 2.8 % (ref 0.3–6.2)
ERYTHROCYTE [DISTWIDTH] IN BLOOD BY AUTOMATED COUNT: 13.9 % (ref 12.3–15.4)
GLUCOSE BLDC GLUCOMTR-MCNC: 229 MG/DL (ref 70–130)
GLUCOSE BLDC GLUCOMTR-MCNC: 294 MG/DL (ref 70–130)
GLUCOSE BLDC GLUCOMTR-MCNC: 307 MG/DL (ref 70–130)
GLUCOSE BLDC GLUCOMTR-MCNC: 349 MG/DL (ref 70–130)
GLUCOSE BLDC GLUCOMTR-MCNC: 367 MG/DL (ref 70–130)
GLUCOSE SERPL-MCNC: 195 MG/DL (ref 65–99)
HCO3 BLDA-SCNC: 22.7 MMOL/L (ref 22–28)
HCT VFR BLD AUTO: 24.1 % (ref 34–46.6)
HGB BLD-MCNC: 8.5 G/DL (ref 12–15.9)
INHALED O2 CONCENTRATION: 21 %
LYMPHOCYTES # BLD AUTO: 1.25 10*3/MM3 (ref 0.7–3.1)
LYMPHOCYTES NFR BLD AUTO: 13.9 % (ref 19.6–45.3)
MCH RBC QN AUTO: 33.6 PG (ref 26.6–33)
MCHC RBC AUTO-ENTMCNC: 35.3 G/DL (ref 31.5–35.7)
MCV RBC AUTO: 95.3 FL (ref 79–97)
MODALITY: ABNORMAL
MONOCYTES # BLD AUTO: 0.4 10*3/MM3 (ref 0.1–0.9)
MONOCYTES NFR BLD AUTO: 4.4 % (ref 5–12)
NEUTROPHILS NFR BLD AUTO: 7.04 10*3/MM3 (ref 1.7–7)
NEUTROPHILS NFR BLD AUTO: 78.1 % (ref 42.7–76)
O2 A-A PPRESDIFF RESPIRATORY: 0.7 MMHG
PCO2 BLDA: 31.5 MM HG (ref 35–45)
PEEP RESPIRATORY: 5 CM[H2O]
PH BLDA: 7.46 PH UNITS (ref 7.35–7.45)
PLATELET # BLD AUTO: 84 10*3/MM3 (ref 140–450)
PMV BLD AUTO: ABNORMAL FL
PO2 BLDA: 83.8 MM HG (ref 80–100)
POTASSIUM SERPL-SCNC: 2.9 MMOL/L (ref 3.5–5.2)
RBC # BLD AUTO: 2.53 10*6/MM3 (ref 3.77–5.28)
SAO2 % BLDCOA: 97 % (ref 92–99)
SET MECH RESP RATE: 18
SODIUM SERPL-SCNC: 137 MMOL/L (ref 136–145)
TOTAL RATE: 18 BREATHS/MINUTE
VENTILATOR MODE: ABNORMAL
VT ON VENT VENT: 500 ML
WBC NRBC COR # BLD: 9.01 10*3/MM3 (ref 3.4–10.8)

## 2023-05-06 PROCEDURE — 85025 COMPLETE CBC W/AUTO DIFF WBC: CPT | Performed by: INTERNAL MEDICINE

## 2023-05-06 PROCEDURE — 71045 X-RAY EXAM CHEST 1 VIEW: CPT

## 2023-05-06 PROCEDURE — 94761 N-INVAS EAR/PLS OXIMETRY MLT: CPT

## 2023-05-06 PROCEDURE — 82948 REAGENT STRIP/BLOOD GLUCOSE: CPT

## 2023-05-06 PROCEDURE — 82803 BLOOD GASES ANY COMBINATION: CPT

## 2023-05-06 PROCEDURE — 87070 CULTURE OTHR SPECIMN AEROBIC: CPT | Performed by: INTERNAL MEDICINE

## 2023-05-06 PROCEDURE — 80048 BASIC METABOLIC PNL TOTAL CA: CPT | Performed by: INTERNAL MEDICINE

## 2023-05-06 PROCEDURE — 84132 ASSAY OF SERUM POTASSIUM: CPT | Performed by: INTERNAL MEDICINE

## 2023-05-06 PROCEDURE — 94799 UNLISTED PULMONARY SVC/PX: CPT

## 2023-05-06 PROCEDURE — 25010000002 METHYLPREDNISOLONE PER 125 MG: Performed by: INTERNAL MEDICINE

## 2023-05-06 PROCEDURE — 25010000002 VANCOMYCIN PER 500 MG: Performed by: INTERNAL MEDICINE

## 2023-05-06 PROCEDURE — 63710000001 INSULIN REGULAR HUMAN PER 5 UNITS: Performed by: INTERNAL MEDICINE

## 2023-05-06 PROCEDURE — 94003 VENT MGMT INPAT SUBQ DAY: CPT

## 2023-05-06 PROCEDURE — 87205 SMEAR GRAM STAIN: CPT | Performed by: INTERNAL MEDICINE

## 2023-05-06 PROCEDURE — 25010000002 HEPARIN (PORCINE) PER 1000 UNITS: Performed by: INTERNAL MEDICINE

## 2023-05-06 PROCEDURE — 94760 N-INVAS EAR/PLS OXIMETRY 1: CPT

## 2023-05-06 PROCEDURE — 25010000002 PIPERACILLIN SOD-TAZOBACTAM PER 1 G: Performed by: INTERNAL MEDICINE

## 2023-05-06 PROCEDURE — 36600 WITHDRAWAL OF ARTERIAL BLOOD: CPT

## 2023-05-06 RX ORDER — METHYLPREDNISOLONE SODIUM SUCCINATE 125 MG/2ML
60 INJECTION, POWDER, LYOPHILIZED, FOR SOLUTION INTRAMUSCULAR; INTRAVENOUS EVERY 12 HOURS
Status: DISCONTINUED | OUTPATIENT
Start: 2023-05-06 | End: 2023-05-07

## 2023-05-06 RX ADMIN — NYSTATIN: 100000 POWDER TOPICAL at 21:37

## 2023-05-06 RX ADMIN — NYSTATIN: 100000 POWDER TOPICAL at 09:07

## 2023-05-06 RX ADMIN — ANORECTAL OINTMENT 1 APPLICATION: 15.7; .44; 24; 20.6 OINTMENT TOPICAL at 21:37

## 2023-05-06 RX ADMIN — CASTOR OIL AND BALSAM, PERU 1 APPLICATION: 788; 87 OINTMENT TOPICAL at 21:35

## 2023-05-06 RX ADMIN — TAZOBACTAM SODIUM AND PIPERACILLIN SODIUM 3.38 G: 375; 3 INJECTION, SOLUTION INTRAVENOUS at 11:39

## 2023-05-06 RX ADMIN — DEXMEDETOMIDINE HYDROCHLORIDE 0.2 MCG/KG/HR: 4 INJECTION, SOLUTION INTRAVENOUS at 09:06

## 2023-05-06 RX ADMIN — TAZOBACTAM SODIUM AND PIPERACILLIN SODIUM 3.38 G: 375; 3 INJECTION, SOLUTION INTRAVENOUS at 18:30

## 2023-05-06 RX ADMIN — INSULIN HUMAN 4 UNITS: 100 INJECTION, SOLUTION PARENTERAL at 00:17

## 2023-05-06 RX ADMIN — METHYLPREDNISOLONE SODIUM SUCCINATE 60 MG: 125 INJECTION, POWDER, FOR SOLUTION INTRAMUSCULAR; INTRAVENOUS at 09:05

## 2023-05-06 RX ADMIN — HEPARIN SODIUM 5000 UNITS: 5000 INJECTION INTRAVENOUS; SUBCUTANEOUS at 06:19

## 2023-05-06 RX ADMIN — CASTOR OIL AND BALSAM, PERU 1 APPLICATION: 788; 87 OINTMENT TOPICAL at 09:06

## 2023-05-06 RX ADMIN — CHLORHEXIDINE GLUCONATE 0.12% ORAL RINSE 15 ML: 1.2 LIQUID ORAL at 21:37

## 2023-05-06 RX ADMIN — INSULIN HUMAN 20 UNITS: 100 INJECTION, SOLUTION PARENTERAL at 23:36

## 2023-05-06 RX ADMIN — POTASSIUM CHLORIDE 40 MEQ: 1.5 POWDER, FOR SOLUTION ORAL at 06:57

## 2023-05-06 RX ADMIN — CHLORHEXIDINE GLUCONATE 0.12% ORAL RINSE 15 ML: 1.2 LIQUID ORAL at 09:06

## 2023-05-06 RX ADMIN — VANCOMYCIN HYDROCHLORIDE 1000 MG: 1 INJECTION, SOLUTION INTRAVENOUS at 17:10

## 2023-05-06 RX ADMIN — FAMOTIDINE 20 MG: 10 INJECTION INTRAVENOUS at 21:36

## 2023-05-06 RX ADMIN — HEPARIN SODIUM 5000 UNITS: 5000 INJECTION INTRAVENOUS; SUBCUTANEOUS at 14:11

## 2023-05-06 RX ADMIN — INSULIN HUMAN 3 UNITS: 100 INJECTION, SOLUTION PARENTERAL at 06:19

## 2023-05-06 RX ADMIN — HEPARIN SODIUM 5000 UNITS: 5000 INJECTION INTRAVENOUS; SUBCUTANEOUS at 21:36

## 2023-05-06 RX ADMIN — CASTOR OIL AND BALSAM, PERU 1 APPLICATION: 788; 87 OINTMENT TOPICAL at 21:37

## 2023-05-06 RX ADMIN — METHYLPREDNISOLONE SODIUM SUCCINATE 60 MG: 125 INJECTION, POWDER, FOR SOLUTION INTRAMUSCULAR; INTRAVENOUS at 21:35

## 2023-05-06 RX ADMIN — FAMOTIDINE 20 MG: 10 INJECTION INTRAVENOUS at 09:06

## 2023-05-06 RX ADMIN — POTASSIUM CHLORIDE 40 MEQ: 1.5 POWDER, FOR SOLUTION ORAL at 11:39

## 2023-05-06 RX ADMIN — INSULIN GLARGINE-YFGN 10 UNITS: 100 INJECTION, SOLUTION SUBCUTANEOUS at 09:05

## 2023-05-06 RX ADMIN — INSULIN HUMAN 16 UNITS: 100 INJECTION, SOLUTION PARENTERAL at 17:10

## 2023-05-06 RX ADMIN — POTASSIUM CHLORIDE 40 MEQ: 1.5 POWDER, FOR SOLUTION ORAL at 17:11

## 2023-05-06 RX ADMIN — MUPIROCIN 1 APPLICATION: 20 OINTMENT TOPICAL at 21:37

## 2023-05-06 RX ADMIN — TAZOBACTAM SODIUM AND PIPERACILLIN SODIUM 3.38 G: 375; 3 INJECTION, SOLUTION INTRAVENOUS at 03:29

## 2023-05-06 RX ADMIN — MUPIROCIN 1 APPLICATION: 20 OINTMENT TOPICAL at 09:07

## 2023-05-06 RX ADMIN — ANORECTAL OINTMENT 1 APPLICATION: 15.7; .44; 24; 20.6 OINTMENT TOPICAL at 09:07

## 2023-05-06 RX ADMIN — VANCOMYCIN HYDROCHLORIDE 1000 MG: 1 INJECTION, SOLUTION INTRAVENOUS at 06:19

## 2023-05-06 RX ADMIN — INSULIN HUMAN 16 UNITS: 100 INJECTION, SOLUTION PARENTERAL at 11:56

## 2023-05-06 RX ADMIN — Medication 10 ML: at 09:06

## 2023-05-06 RX ADMIN — Medication 10 ML: at 21:36

## 2023-05-06 NOTE — PROGRESS NOTES
"  PROGRESS NOTE  Patient Name: Shama Cruz  Age/Sex: 68 y.o. female  : 1955  MRN: [id number]    Date of Admission: 2023  Date of Encounter Visit: 23   LOS: 2 days   Patient Care Team:  Archie Murray MD as PCP - General (Pulmonary Disease)    Chief Complaint: Respiratory failure    Hospital course: Patient came in with respiratory failure, and sepsis, had a positive urine culture and only positive MRSA screen with no respiratory culture in the system.  She was started on Zosyn, urine sensitivity is not back yet.  Patient was doing good yesterday and got extubated just to be reintubated shortly afterwards because of inability to handle secretion.  She is afebrile her white count has been normal throughout with only 1 measurement that was slightly above the normal and but the procalcitonin was positive even though slightly.  She is awake and responsive  She is orally intubated on the ventilator  She has a PEG tube that is being used for feeding  Patient is arousable and may follow simple commands.      REVIEW OF SYSTEMS:   CONSTITUTIONAL: no fever or chills  Otherwise limited system review.     Ventilator/Non-Invasive Ventilation Settings (From admission, onward)     Start     Ordered    23 1653  Ventilator - AC/VC; Other; 18; 30%; 5; 500  Continuous        Question Answer Comment   Vent Mode AC/VC    Rate Other    Rate 18    FiO2 30%    PEEP 5    Flow Rate 500        23 1652                  Vital Signs  Temp:  [98.1 °F (36.7 °C)-99.1 °F (37.3 °C)] 98.2 °F (36.8 °C)  Heart Rate:  [] 78  Resp:  [18-21] 18  BP: ()/(44-79) 106/46  FiO2 (%):  [21 %-25 %] 21 %  SpO2:  [96 %-100 %] 97 %  on    Device (Oxygen Therapy): ventilator    Intake/Output Summary (Last 24 hours) at 2023 0737  Last data filed at 2023 0400  Gross per 24 hour   Intake 3076 ml   Output 1850 ml   Net 1226 ml     Flowsheet Rows    Flowsheet Row First Filed Value   Admission Height 162.6 cm (64\") " Documented at 05/04/2023 0454   Admission Weight 90.7 kg (200 lb) Documented at 05/04/2023 0454        Body mass index is 26.29 kg/m².      05/04/23  2236 05/05/23  0600 05/06/23  0400   Weight: 68.4 kg (150 lb 12.7 oz) 68.4 kg (150 lb 12.7 oz) 69.5 kg (153 lb 3.5 oz)       Physical Exam:  GEN:   orally intubated, sedated but arousable, on the ventilator  EYES:   Sclerae clear. No icterus. PERRL. Normal EOM  ENT:   External ears/nose normal, no oral lesions, no thrush, mucous membranes moist, oral ET tube  NECK:  Supple, midline trachea, no JVD  LUNGS: Normal chest on inspection, breathing in sync with the ventilator, very coarse rhonchi and positive expiratory wheezes, no crackles.   CV:  Regular rhythm and rate. Normal S1/S2. No murmurs, gallops, or rubs noted.  ABD:  Soft, nontender and nondistended. Normal bowel sounds. No guarding.  PEG tube site is clean with no discharge or erythema  EXT: Patient was not moving the right upper extremity as well same for the right lower extremity, she does follow commands on the left. No cyanosis. No redness.  Trace edema.   Skin: Dry, intact, no bleeding    Results Review:    Results From Last 14 Days   Lab Units 05/05/23  0610 05/04/23  2328 05/04/23  1655   LACTATE mmol/L 2.7* 2.7* 3.6*     Results from last 7 days   Lab Units 05/06/23  0322 05/05/23  0413 05/04/23  1226 05/04/23  0830 05/04/23  0416   SODIUM mmol/L 137 142 150* 150* 149*   POTASSIUM mmol/L 2.9* 4.1 3.0* 3.5 5.0   CHLORIDE mmol/L 107 112* 113* 115* 111*   CO2 mmol/L 19.6* 21.0* 23.7 24.0 24.6   BUN mg/dL 17 25* 31* 34* 42*   CREATININE mg/dL 0.38* 0.50* 0.54* 0.66 0.84   CALCIUM mg/dL 6.9* 7.0* 7.9* 8.1* 9.0   AST (SGOT) U/L  --   --   --   --  36*   ALT (SGPT) U/L  --   --   --   --  14   ANION GAP mmol/L 10.4 9.0 13.3 11.0 13.4   ALBUMIN g/dL  --   --  3.1*  --  3.0*     Results from last 7 days   Lab Units 05/04/23  0830 05/04/23  0416   HSTROP T ng/L 29* 41*             Results from last 7 days   Lab  Units 05/06/23  0322 05/05/23  0358 05/04/23  0416   WBC 10*3/mm3 9.01 11.61* 10.42   HEMOGLOBIN g/dL 8.5* 9.5* 11.0*   HEMATOCRIT % 24.1* 28.4* 33.8*   PLATELETS 10*3/mm3 84* 85* 98*   MCV fL 95.3 99.3* 100.3*   NEUTROPHIL % % 78.1* 78.1* 78.4*   LYMPHOCYTE % % 13.9* 14.1* 13.7*   MONOCYTES % % 4.4* 5.4 7.0   EOSINOPHIL % % 2.8 1.6 0.0*   BASOPHIL % % 0.2 0.3 0.2         Results from last 7 days   Lab Units 05/05/23  0413   MAGNESIUM mg/dL 2.0           Invalid input(s): LDLCALC  Results from last 7 days   Lab Units 05/06/23  0308 05/05/23  1209 05/05/23  0804 05/05/23  0328 05/04/23  0304   PH, ARTERIAL pH units 7.465* 7.416 7.407 7.400 7.373   PCO2, ARTERIAL mm Hg 31.5* 33.0* 35.1 32.8* 51.0*   PO2 ART mm Hg 83.8 101.7* 93.8 92.8 463.2*   HCO3 ART mmol/L 22.7 21.3* 22.1 20.3* 29.7*         Glucose   Date/Time Value Ref Range Status   05/06/2023 0614 229 (H) 70 - 130 mg/dL Final     Comment:     Meter: [id number] : [id number] Shannon Caicedo RN   05/05/2023 2351 282 (H) 70 - 130 mg/dL Final     Comment:     Meter: [id number] : [id number] Jayro Rod NA   05/05/2023 1728 249 (H) 70 - 130 mg/dL Final     Comment:     Meter: [id number] : [id number] Crow Lucas RN   05/05/2023 1135 226 (H) 70 - 130 mg/dL Final     Comment:     Meter: [id number] : [id number] Smith Buddy NA   05/05/2023 0617 232 (H) 70 - 130 mg/dL Final     Comment:     Meter: [id number] : [id number] Allie Cox RN   05/04/2023 2346 230 (H) 70 - 130 mg/dL Final     Comment:     Meter: [id number] : [id number] Darell Solomon RN   05/04/2023 1825 226 (H) 70 - 130 mg/dL Final     Comment:     Meter: [id number] : [id number] Juan STRONG   05/04/2023 1107 177 (H) 70 - 130 mg/dL Final     Comment:     Meter: [id number] : [id number] Juan STRONG     Results from last 7 days   Lab Units 05/05/23  0610 05/04/23  2328 05/04/23  1655 05/04/23  1226 05/04/23  0830 05/04/23  0416   PROCALCITONIN ng/mL  --   --   --    --   --  0.33*   LACTATE mmol/L 2.7* 2.7* 3.6* 3.7* 3.6* 5.1*     Results from last 7 days   Lab Units 05/04/23  0440 05/04/23 0428 05/04/23 0423   BLOODCX  No growth at 2 days  --  No growth at 2 days   URINECX   --  >100,000 CFU/mL Gram Negative Bacilli*  --      Results from last 7 days   Lab Units 05/04/23  0428   NITRITE UA  Negative   WBC UA /HPF 6-12*   BACTERIA UA /HPF 2+*   SQUAM EPITHEL UA /HPF 0-2   URINECX  >100,000 CFU/mL Gram Negative Bacilli*                   Imaging:   Imaging Results (All)     Procedure Component Value Units Date/Time       I reviewed the patient's new clinical results.  I personally viewed and interpreted the patient's imaging results:        Medication Review:   acetaminophen, 1,000 mg, Oral, Once  castor oil-balsam peru, 1 application, Topical, Q12H  castor oil-balsam peru, 1 application, Topical, Q12H  chlorhexidine, 15 mL, Mouth/Throat, Q12H  famotidine, 20 mg, Intravenous, BID  heparin (porcine), 5,000 Units, Subcutaneous, Q8H  insulin regular, 2-7 Units, Subcutaneous, Q6H  Menthol-Zinc Oxide, 1 application, Topical, BID  mupirocin, 1 application, Topical, Q12H  nystatin, , Topical, Q12H  piperacillin-tazobactam, 3.375 g, Intravenous, Q8H  sodium chloride, 30 mL/kg, Intravenous, Once  sodium chloride, 500 mL, Intravenous, Once  sodium chloride, 10 mL, Intravenous, Q12H  vancomycin, 1,000 mg, Intravenous, Q12H        dexmedetomidine, 0.2-1.5 mcg/kg/hr, Last Rate: 0.2 mcg/kg/hr (05/05/23 1457)  norepinephrine, 0.02-0.3 mcg/kg/min  Pharmacy to dose vancomycin,         ASSESSMENT:   1. Acute hypoxemic respiratory failure, failed extubation and was reintubated on 5/5/2023  2. Sepsis  3. Pneumonia, on Zosyn, positive MRSA screen  4. UTI - ecoli await sensitivities from culture E. coli growing  5. MRSA swab positive however patient has no infiltrate  6. Lactic acidosis   7. Hypokalemia   8. Mild hypernatremia  9. Diabetes with hyperglycemia  10. Dementia  11. Management of  mechanical ventilation.  12. Thrombocytopenia, platelet counts in the 80s and has been stable  13. Status post PEG  14. 3 cm indeterminate right adrenal nodule. - outpt follow up  15. History of prior stroke    PLAN:  Patient had sepsis with positive E. coli on urine culture and positive MRSA screen on nasal swab.White count was not that elevated to start with with the procalcitonin was borderline positive, kidney function is stable with hypokalemia as far as electrolyte imbalances and slight hypocalcemia.  Lactic acid was elevated but only mildly.  Patient has been afebrile and failed extubation on 5/5/2023.  Accu-Chek has been out of range above 200 consistently  Even though the patient has no significant pneumonia on the CAT scan except some minimal changes over the right lower lobe which can be atelectatic however the lung exam showed significant wheezes and rhonchi and patient failed extubation because of excessive secretions.  We will get respiratory culture to get a quantitative idea about the MRSA infection, And we will continue the vancomycin.  Patient will be started on scheduled bronchodilators as well as start her on IV steroids to help with the wheezing  Her glycemic control has been consistently elevated, she is on tube feeding per the PEG tube, we will go ahead and start long-acting insulin and anticipate even more insulin resistance with the initiation of steroids, further adjustment on that may be necessary  Discussed with nursing staff at the bedside and with respiratory therapist    Disposition: Continue to monitor in the CICU, keep on the ventilator    Bryan Cabrera MD  05/06/23  07:37 EDT      Time: Critical care 36 min      Dictated utilizing Dragon dictation

## 2023-05-06 NOTE — PLAN OF CARE
Goal Outcome Evaluation:  Plan of Care Reviewed With: patient        Progress: improving     Pt remain in CICU on the vent and following commands. Had 1200 UOP, VSS, will continue to monitor.

## 2023-05-06 NOTE — PLAN OF CARE
Goal Outcome Evaluation:  Plan of Care Reviewed With: patient   Pt. Continues on Vent. She tolerated being on spontaneous pressure support about 45min before having to go back to AC mode. Tube feeds infusing and no residuals. Pt continues with loose stools through FMS. Will monitor

## 2023-05-07 ENCOUNTER — APPOINTMENT (OUTPATIENT)
Dept: GENERAL RADIOLOGY | Facility: HOSPITAL | Age: 68
End: 2023-05-07
Payer: MEDICARE

## 2023-05-07 LAB
ARTERIAL PATENCY WRIST A: POSITIVE
ATMOSPHERIC PRESS: 750.1 MMHG
BASE EXCESS BLDA CALC-SCNC: -0.5 MMOL/L (ref 0–2)
BASOPHILS # BLD AUTO: 0.01 10*3/MM3 (ref 0–0.2)
BASOPHILS NFR BLD AUTO: 0.1 % (ref 0–1.5)
BDY SITE: ABNORMAL
CREAT SERPL-MCNC: 0.5 MG/DL (ref 0.57–1)
DEPRECATED RDW RBC AUTO: 50.3 FL (ref 37–54)
EGFRCR SERPLBLD CKD-EPI 2021: 102.9 ML/MIN/1.73
EOSINOPHIL # BLD AUTO: 0 10*3/MM3 (ref 0–0.4)
EOSINOPHIL NFR BLD AUTO: 0 % (ref 0.3–6.2)
ERYTHROCYTE [DISTWIDTH] IN BLOOD BY AUTOMATED COUNT: 14.3 % (ref 12.3–15.4)
GLUCOSE BLDC GLUCOMTR-MCNC: 314 MG/DL (ref 70–130)
GLUCOSE BLDC GLUCOMTR-MCNC: 326 MG/DL (ref 70–130)
GLUCOSE BLDC GLUCOMTR-MCNC: 328 MG/DL (ref 70–130)
GLUCOSE BLDC GLUCOMTR-MCNC: 338 MG/DL (ref 70–130)
GLUCOSE BLDC GLUCOMTR-MCNC: 348 MG/DL (ref 70–130)
HCO3 BLDA-SCNC: 23.1 MMOL/L (ref 22–28)
HCT VFR BLD AUTO: 26.7 % (ref 34–46.6)
HGB BLD-MCNC: 9.2 G/DL (ref 12–15.9)
INHALED O2 CONCENTRATION: 21 %
LYMPHOCYTES # BLD AUTO: 0.64 10*3/MM3 (ref 0.7–3.1)
LYMPHOCYTES NFR BLD AUTO: 5.8 % (ref 19.6–45.3)
MCH RBC QN AUTO: 33.3 PG (ref 26.6–33)
MCHC RBC AUTO-ENTMCNC: 34.5 G/DL (ref 31.5–35.7)
MCV RBC AUTO: 96.7 FL (ref 79–97)
MODALITY: ABNORMAL
MONOCYTES # BLD AUTO: 0.42 10*3/MM3 (ref 0.1–0.9)
MONOCYTES NFR BLD AUTO: 3.8 % (ref 5–12)
NEUTROPHILS NFR BLD AUTO: 88.9 % (ref 42.7–76)
NEUTROPHILS NFR BLD AUTO: 9.76 10*3/MM3 (ref 1.7–7)
O2 A-A PPRESDIFF RESPIRATORY: 0.7 MMHG
PCO2 BLDA: 33.6 MM HG (ref 35–45)
PEEP RESPIRATORY: 5 CM[H2O]
PH BLDA: 7.45 PH UNITS (ref 7.35–7.45)
PLATELET # BLD AUTO: 113 10*3/MM3 (ref 140–450)
PMV BLD AUTO: 14.8 FL (ref 6–12)
PO2 BLDA: 81.2 MM HG (ref 80–100)
POTASSIUM SERPL-SCNC: 4.6 MMOL/L (ref 3.5–5.2)
RBC # BLD AUTO: 2.76 10*6/MM3 (ref 3.77–5.28)
SAO2 % BLDCOA: 96.5 % (ref 92–99)
SET MECH RESP RATE: 18
TOTAL RATE: 18 BREATHS/MINUTE
VANCOMYCIN TROUGH SERPL-MCNC: 19.5 MCG/ML (ref 5–20)
VENTILATOR MODE: ABNORMAL
VT ON VENT VENT: 500 ML
WBC NRBC COR # BLD: 10.98 10*3/MM3 (ref 3.4–10.8)

## 2023-05-07 PROCEDURE — 25010000002 HEPARIN (PORCINE) PER 1000 UNITS: Performed by: INTERNAL MEDICINE

## 2023-05-07 PROCEDURE — 94003 VENT MGMT INPAT SUBQ DAY: CPT

## 2023-05-07 PROCEDURE — 25010000002 PIPERACILLIN SOD-TAZOBACTAM PER 1 G: Performed by: INTERNAL MEDICINE

## 2023-05-07 PROCEDURE — 71045 X-RAY EXAM CHEST 1 VIEW: CPT

## 2023-05-07 PROCEDURE — 63710000001 PREDNISONE PER 1 MG: Performed by: INTERNAL MEDICINE

## 2023-05-07 PROCEDURE — 25010000002 VANCOMYCIN PER 500 MG: Performed by: INTERNAL MEDICINE

## 2023-05-07 PROCEDURE — 25010000002 MIDAZOLAM PER 1 MG

## 2023-05-07 PROCEDURE — 25010000002 FENTANYL CITRATE (PF) 50 MCG/ML SOLUTION: Performed by: INTERNAL MEDICINE

## 2023-05-07 PROCEDURE — 82803 BLOOD GASES ANY COMBINATION: CPT

## 2023-05-07 PROCEDURE — 87205 SMEAR GRAM STAIN: CPT | Performed by: INTERNAL MEDICINE

## 2023-05-07 PROCEDURE — 25010000002 VANCOMYCIN 750 MG RECONSTITUTED SOLUTION 1 EACH VIAL: Performed by: INTERNAL MEDICINE

## 2023-05-07 PROCEDURE — 36600 WITHDRAWAL OF ARTERIAL BLOOD: CPT

## 2023-05-07 PROCEDURE — 94799 UNLISTED PULMONARY SVC/PX: CPT

## 2023-05-07 PROCEDURE — 94761 N-INVAS EAR/PLS OXIMETRY MLT: CPT

## 2023-05-07 PROCEDURE — 0 LIDOCAINE 1 % SOLUTION: Performed by: INTERNAL MEDICINE

## 2023-05-07 PROCEDURE — 82565 ASSAY OF CREATININE: CPT | Performed by: INTERNAL MEDICINE

## 2023-05-07 PROCEDURE — 94760 N-INVAS EAR/PLS OXIMETRY 1: CPT

## 2023-05-07 PROCEDURE — 85025 COMPLETE CBC W/AUTO DIFF WBC: CPT | Performed by: INTERNAL MEDICINE

## 2023-05-07 PROCEDURE — 25010000002 MIDAZOLAM PER 1 MG: Performed by: INTERNAL MEDICINE

## 2023-05-07 PROCEDURE — 82948 REAGENT STRIP/BLOOD GLUCOSE: CPT

## 2023-05-07 PROCEDURE — 0B9M8ZX DRAINAGE OF BILATERAL LUNGS, VIA NATURAL OR ARTIFICIAL OPENING ENDOSCOPIC, DIAGNOSTIC: ICD-10-PCS | Performed by: INTERNAL MEDICINE

## 2023-05-07 PROCEDURE — 80202 ASSAY OF VANCOMYCIN: CPT | Performed by: INTERNAL MEDICINE

## 2023-05-07 PROCEDURE — 63710000001 INSULIN REGULAR HUMAN PER 5 UNITS: Performed by: INTERNAL MEDICINE

## 2023-05-07 PROCEDURE — 87070 CULTURE OTHR SPECIMN AEROBIC: CPT | Performed by: INTERNAL MEDICINE

## 2023-05-07 RX ORDER — MIDAZOLAM HYDROCHLORIDE 1 MG/ML
INJECTION INTRAMUSCULAR; INTRAVENOUS
Status: COMPLETED
Start: 2023-05-07 | End: 2023-05-07

## 2023-05-07 RX ORDER — MIDAZOLAM HYDROCHLORIDE 2 MG/ML
2 SYRUP ORAL ONCE
Status: DISCONTINUED | OUTPATIENT
Start: 2023-05-07 | End: 2023-05-07

## 2023-05-07 RX ORDER — MIDAZOLAM HYDROCHLORIDE 1 MG/ML
2 INJECTION INTRAMUSCULAR; INTRAVENOUS ONCE
Status: COMPLETED | OUTPATIENT
Start: 2023-05-07 | End: 2023-05-07

## 2023-05-07 RX ORDER — PREDNISONE 20 MG/1
20 TABLET ORAL DAILY
Status: DISCONTINUED | OUTPATIENT
Start: 2023-05-07 | End: 2023-05-08

## 2023-05-07 RX ORDER — LIDOCAINE HYDROCHLORIDE 10 MG/ML
20 INJECTION, SOLUTION INFILTRATION; PERINEURAL ONCE
Status: COMPLETED | OUTPATIENT
Start: 2023-05-07 | End: 2023-05-07

## 2023-05-07 RX ORDER — FENTANYL CITRATE 50 UG/ML
200 INJECTION, SOLUTION INTRAMUSCULAR; INTRAVENOUS ONCE
Status: COMPLETED | OUTPATIENT
Start: 2023-05-07 | End: 2023-05-07

## 2023-05-07 RX ORDER — LEVOFLOXACIN 500 MG/1
500 TABLET, FILM COATED ORAL EVERY 24 HOURS
Status: DISCONTINUED | OUTPATIENT
Start: 2023-05-07 | End: 2023-05-08

## 2023-05-07 RX ADMIN — INSULIN GLARGINE-YFGN 10 UNITS: 100 INJECTION, SOLUTION SUBCUTANEOUS at 11:02

## 2023-05-07 RX ADMIN — MIDAZOLAM HYDROCHLORIDE 2 MG: 1 INJECTION INTRAMUSCULAR; INTRAVENOUS at 11:47

## 2023-05-07 RX ADMIN — CASTOR OIL AND BALSAM, PERU 1 APPLICATION: 788; 87 OINTMENT TOPICAL at 21:16

## 2023-05-07 RX ADMIN — FAMOTIDINE 20 MG: 10 INJECTION INTRAVENOUS at 09:05

## 2023-05-07 RX ADMIN — FAMOTIDINE 20 MG: 10 INJECTION INTRAVENOUS at 21:15

## 2023-05-07 RX ADMIN — INSULIN HUMAN 16 UNITS: 100 INJECTION, SOLUTION PARENTERAL at 11:58

## 2023-05-07 RX ADMIN — HEPARIN SODIUM 5000 UNITS: 5000 INJECTION INTRAVENOUS; SUBCUTANEOUS at 14:53

## 2023-05-07 RX ADMIN — NYSTATIN: 100000 POWDER TOPICAL at 21:16

## 2023-05-07 RX ADMIN — ANORECTAL OINTMENT 1 APPLICATION: 15.7; .44; 24; 20.6 OINTMENT TOPICAL at 21:17

## 2023-05-07 RX ADMIN — Medication 10 ML: at 21:15

## 2023-05-07 RX ADMIN — LIDOCAINE HYDROCHLORIDE 20 ML: 10 INJECTION, SOLUTION INFILTRATION; PERINEURAL at 11:45

## 2023-05-07 RX ADMIN — PREDNISONE 20 MG: 20 TABLET ORAL at 11:03

## 2023-05-07 RX ADMIN — CASTOR OIL AND BALSAM, PERU 1 APPLICATION: 788; 87 OINTMENT TOPICAL at 09:06

## 2023-05-07 RX ADMIN — VANCOMYCIN HYDROCHLORIDE 1000 MG: 1 INJECTION, SOLUTION INTRAVENOUS at 06:28

## 2023-05-07 RX ADMIN — FENTANYL CITRATE 200 MCG: 50 INJECTION, SOLUTION INTRAMUSCULAR; INTRAVENOUS at 11:41

## 2023-05-07 RX ADMIN — INSULIN HUMAN 16 UNITS: 100 INJECTION, SOLUTION PARENTERAL at 17:31

## 2023-05-07 RX ADMIN — MUPIROCIN 1 APPLICATION: 20 OINTMENT TOPICAL at 21:16

## 2023-05-07 RX ADMIN — NYSTATIN: 100000 POWDER TOPICAL at 09:08

## 2023-05-07 RX ADMIN — INSULIN HUMAN 16 UNITS: 100 INJECTION, SOLUTION PARENTERAL at 05:36

## 2023-05-07 RX ADMIN — LEVOFLOXACIN 500 MG: 500 TABLET, FILM COATED ORAL at 11:03

## 2023-05-07 RX ADMIN — HEPARIN SODIUM 5000 UNITS: 5000 INJECTION INTRAVENOUS; SUBCUTANEOUS at 21:15

## 2023-05-07 RX ADMIN — DEXMEDETOMIDINE HYDROCHLORIDE 0.2 MCG/KG/HR: 4 INJECTION, SOLUTION INTRAVENOUS at 11:02

## 2023-05-07 RX ADMIN — CHLORHEXIDINE GLUCONATE 0.12% ORAL RINSE 15 ML: 1.2 LIQUID ORAL at 09:07

## 2023-05-07 RX ADMIN — Medication 10 ML: at 09:05

## 2023-05-07 RX ADMIN — TAZOBACTAM SODIUM AND PIPERACILLIN SODIUM 3.38 G: 375; 3 INJECTION, SOLUTION INTRAVENOUS at 03:21

## 2023-05-07 RX ADMIN — HEPARIN SODIUM 5000 UNITS: 5000 INJECTION INTRAVENOUS; SUBCUTANEOUS at 05:36

## 2023-05-07 RX ADMIN — MUPIROCIN 1 APPLICATION: 20 OINTMENT TOPICAL at 09:08

## 2023-05-07 RX ADMIN — MIDAZOLAM 2 MG: 1 INJECTION INTRAMUSCULAR; INTRAVENOUS at 11:47

## 2023-05-07 RX ADMIN — ANORECTAL OINTMENT 1 APPLICATION: 15.7; .44; 24; 20.6 OINTMENT TOPICAL at 09:08

## 2023-05-07 RX ADMIN — MIDAZOLAM 2 MG: 1 INJECTION INTRAMUSCULAR; INTRAVENOUS at 11:41

## 2023-05-07 RX ADMIN — INSULIN GLARGINE-YFGN 10 UNITS: 100 INJECTION, SOLUTION SUBCUTANEOUS at 09:08

## 2023-05-07 RX ADMIN — VANCOMYCIN HYDROCHLORIDE 750 MG: 750 INJECTION, POWDER, LYOPHILIZED, FOR SOLUTION INTRAVENOUS at 17:31

## 2023-05-07 RX ADMIN — INSULIN HUMAN 16 UNITS: 100 INJECTION, SOLUTION PARENTERAL at 23:55

## 2023-05-07 RX ADMIN — CHLORHEXIDINE GLUCONATE 0.12% ORAL RINSE 15 ML: 1.2 LIQUID ORAL at 21:17

## 2023-05-07 NOTE — PLAN OF CARE
Goal Outcome Evaluation:  Plan of Care Reviewed With: patient        Progress: improving     Pt remains in CICU, on the vent and following commands. Pt had 1500 UOP, VSS will continue to monitor.

## 2023-05-07 NOTE — PROGRESS NOTES
PROGRESS NOTE  Patient Name: Shama Cruz  Age/Sex: 68 y.o. female  : 1955  MRN: [id number]    Date of Admission: 2023  Date of Encounter Visit: 23   LOS: 3 days   Patient Care Team:  Archie Murray MD as PCP - General (Pulmonary Disease)    Chief Complaint: Respiratory failure    Hospital course: Patient came in with respiratory failure, and sepsis, had a positive urine culture and only positive MRSA screen with no respiratory culture in the system.  She was started on Zosyn, urine sensitivity just came back this morning and she actually had a Klebsiella aeruginosa that was resistant to Zosyn.  Patient already failed extubation once because that was unable to handle her secretion, she does have diarrhea but no leukocytosis.  She was switched to CPAP yesterday and she has been tolerating that very well but she still have audible rhonchi but not much on suctioning.  She has a PEG tube and she has been on tube feeding with good tolerance  Patient is arousable and able to follow commands.  Afebrile, hemodynamically doing better on no IV pressors      REVIEW OF SYSTEMS:   CONSTITUTIONAL: no fever or chills  Otherwise limited system review.     Ventilator/Non-Invasive Ventilation Settings (From admission, onward)     Start     Ordered    23 1653  Ventilator - AC/VC; Other; 18; 30%; 5; 500  Continuous        Question Answer Comment   Vent Mode AC/VC    Rate Other    Rate 18    FiO2 30%    PEEP 5    Flow Rate 500        23 1652                  Vital Signs  Temp:  [98.6 °F (37 °C)] 98.6 °F (37 °C)  Heart Rate:  [58-79] 72  Resp:  [16-19] 16  BP: ()/(47-59) 107/47  FiO2 (%):  [21 %] 21 %  SpO2:  [97 %-100 %] 98 %  on    Device (Oxygen Therapy): ventilator    Intake/Output Summary (Last 24 hours) at 2023 0845  Last data filed at 2023 0600  Gross per 24 hour   Intake 4204 ml   Output 3150 ml   Net 1054 ml     Flowsheet Rows    Flowsheet Row First Filed Value   Admission  "Height 162.6 cm (64\") Documented at 05/04/2023 0454   Admission Weight 90.7 kg (200 lb) Documented at 05/04/2023 0454        Body mass index is 26.51 kg/m².      05/05/23  0600 05/06/23  0400 05/07/23 0443   Weight: 68.4 kg (150 lb 12.7 oz) 69.5 kg (153 lb 3.5 oz) 70.1 kg (154 lb 8.7 oz)       Physical Exam:  GEN:   orally intubated, sedated but arousable, on the ventilator  EYES:   Sclerae clear. No icterus. PERRL. Normal EOM  ENT:   External ears/nose normal, no oral lesions, no thrush, mucous membranes moist, oral ET tube  NECK:  Supple, midline trachea, no JVD  LUNGS: Normal chest on inspection, breathing in sync with the ventilator, still with coarse expiratory sounds suggestive of retained secretion in the large airways, no crackles.   CV:  Regular rhythm and rate. Normal S1/S2. No murmurs, gallops, or rubs noted.  ABD:  Soft, nontender and nondistended. Normal bowel sounds. No guarding.  PEG tube site is clean with no discharge or erythema  EXT: Patient was not moving the right upper extremity as well same for the right lower extremity, she does follow commands on the left. No cyanosis. No redness.  Trace edema.   Skin: Dry, intact, no bleeding    Results Review:    Results From Last 14 Days   Lab Units 05/05/23  0610 05/04/23  2328 05/04/23  1655   LACTATE mmol/L 2.7* 2.7* 3.6*     Results from last 7 days   Lab Units 05/07/23  0530 05/06/23  2326 05/06/23  0322 05/05/23  0413 05/04/23  1226 05/04/23  0830 05/04/23  0416   SODIUM mmol/L  --   --  137 142 150* 150* 149*   POTASSIUM mmol/L  --  4.6 2.9* 4.1 3.0* 3.5 5.0   CHLORIDE mmol/L  --   --  107 112* 113* 115* 111*   CO2 mmol/L  --   --  19.6* 21.0* 23.7 24.0 24.6   BUN mg/dL  --   --  17 25* 31* 34* 42*   CREATININE mg/dL 0.50*  --  0.38* 0.50* 0.54* 0.66 0.84   CALCIUM mg/dL  --   --  6.9* 7.0* 7.9* 8.1* 9.0   AST (SGOT) U/L  --   --   --   --   --   --  36*   ALT (SGPT) U/L  --   --   --   --   --   --  14   ANION GAP mmol/L  --   --  10.4 9.0 13.3 " 11.0 13.4   ALBUMIN g/dL  --   --   --   --  3.1*  --  3.0*     Results from last 7 days   Lab Units 05/04/23  0830 05/04/23  0416   HSTROP T ng/L 29* 41*             Results from last 7 days   Lab Units 05/06/23  0322 05/05/23  0358 05/04/23  0416   WBC 10*3/mm3 9.01 11.61* 10.42   HEMOGLOBIN g/dL 8.5* 9.5* 11.0*   HEMATOCRIT % 24.1* 28.4* 33.8*   PLATELETS 10*3/mm3 84* 85* 98*   MCV fL 95.3 99.3* 100.3*   NEUTROPHIL % % 78.1* 78.1* 78.4*   LYMPHOCYTE % % 13.9* 14.1* 13.7*   MONOCYTES % % 4.4* 5.4 7.0   EOSINOPHIL % % 2.8 1.6 0.0*   BASOPHIL % % 0.2 0.3 0.2         Results from last 7 days   Lab Units 05/05/23  0413   MAGNESIUM mg/dL 2.0           Invalid input(s): LDLCALC  Results from last 7 days   Lab Units 05/07/23  0341 05/06/23  0308 05/05/23  1209 05/05/23  0804 05/05/23  0328 05/04/23  0304   PH, ARTERIAL pH units 7.445 7.465* 7.416 7.407 7.400 7.373   PCO2, ARTERIAL mm Hg 33.6* 31.5* 33.0* 35.1 32.8* 51.0*   PO2 ART mm Hg 81.2 83.8 101.7* 93.8 92.8 463.2*   HCO3 ART mmol/L 23.1 22.7 21.3* 22.1 20.3* 29.7*         Glucose   Date/Time Value Ref Range Status   05/07/2023 0529 348 (H) 70 - 130 mg/dL Final     Comment:     Meter: [id number] : [id number] Shannon Caicedo RN   05/07/2023 0527 326 (H) 70 - 130 mg/dL Final     Comment:     Meter: [id number] : [id number] Shannon Caicedo RN   05/06/2023 2319 367 (H) 70 - 130 mg/dL Final     Comment:     Meter: [id number] : [id number] Shannon Caicedo RN   05/06/2023 1637 349 (H) 70 - 130 mg/dL Final     Comment:     Meter: [id number] : [id number] Nguyễn Lucas RN   05/06/2023 1147 307 (H) 70 - 130 mg/dL Final     Comment:     Meter: [id number] : [id number] [facility]   05/06/2023 1146 294 (H) 70 - 130 mg/dL Final     Comment:     Meter: [id number] : [id number] [facility]   05/06/2023 0614 229 (H) 70 - 130 mg/dL Final     Comment:     Meter: [id number] : [id number] Shannon Caicedo RN   05/05/2023 2351 282 (H) 70 - 130 mg/dL  Final     Comment:     Meter: [id number] : [id number] Jayro STRONG     Results from last 7 days   Lab Units 05/05/23  0610 05/04/23  2328 05/04/23  1655 05/04/23  1226 05/04/23  0830 05/04/23  0416   PROCALCITONIN ng/mL  --   --   --   --   --  0.33*   LACTATE mmol/L 2.7* 2.7* 3.6* 3.7* 3.6* 5.1*     Results from last 7 days   Lab Units 05/06/23  0940 05/04/23  0440 05/04/23  0428 05/04/23  0423   BLOODCX   --  No growth at 3 days  --  No growth at 3 days   RESPCX  No growth  --   --   --    URINECX   --   --  >100,000 CFU/mL Klebsiella aerogenes*  --      Results from last 7 days   Lab Units 05/04/23  0428   NITRITE UA  Negative   WBC UA /HPF 6-12*   BACTERIA UA /HPF 2+*   SQUAM EPITHEL UA /HPF 0-2   URINECX  >100,000 CFU/mL Klebsiella aerogenes*                   Imaging:   Imaging Results (All)     Procedure Component Value Units Date/Time       I reviewed the patient's new clinical results.  I personally viewed and interpreted the patient's imaging results:        Medication Review:   acetaminophen, 1,000 mg, Oral, Once  castor oil-balsam peru, 1 application, Topical, Q12H  castor oil-balsam peru, 1 application, Topical, Q12H  chlorhexidine, 15 mL, Mouth/Throat, Q12H  famotidine, 20 mg, Intravenous, BID  heparin (porcine), 5,000 Units, Subcutaneous, Q8H  insulin glargine, 10 Units, Subcutaneous, Daily  insulin regular, 4-24 Units, Subcutaneous, Q6H  Menthol-Zinc Oxide, 1 application, Topical, BID  methylPREDNISolone sodium succinate, 60 mg, Intravenous, Q12H  mupirocin, 1 application, Topical, Q12H  nystatin, , Topical, Q12H  piperacillin-tazobactam, 3.375 g, Intravenous, Q8H  sodium chloride, 30 mL/kg, Intravenous, Once  sodium chloride, 500 mL, Intravenous, Once  sodium chloride, 10 mL, Intravenous, Q12H  vancomycin, 750 mg, Intravenous, Q12H        dexmedetomidine, 0.2-1.5 mcg/kg/hr, Last Rate: 0.2 mcg/kg/hr (05/06/23 0906)  norepinephrine, 0.02-0.3 mcg/kg/min  Pharmacy to dose vancomycin,          ASSESSMENT:   1. Acute hypoxemic respiratory failure, failed extubation and was reintubated on 5/5/2023  2. Sepsis  3. Pneumonia, on Zosyn/VANC, positive MRSA screen  4. UTI -updated culture results is now showing Klebsiella aerogenes, Resistant to Zosyn but susceptible to cefepime, sensitive to quinolones and to aminoglycosides and to Bactrim  5. MRSA swab positive however patient has no infiltrate  6. Lactic acidosis   7. Hypokalemia   8. Mild hypernatremia  9. Diabetes with hyperglycemia, worsened by steroids  10. Dementia  11. Management of mechanical ventilation.  12. Thrombocytopenia, platelet counts in the 80s and has been stable  13. Status post PEG  14. 3 cm indeterminate right adrenal nodule. - outpt follow up  15. History of prior stroke    PLAN:  Patient had sepsis with positive Klebsiella aerogenes on urine culture and positive MRSA screen on nasal swab.respiratory culture were sent for confirmation however patient was already on the vancomycin at this point and the chest x-ray does not show any obvious infiltrate but the patient is on the ventilator and has copious secretions.  Patient did fail extubation because of inability to handle secretion and the physical exam is still showing some retained secretion in the large airway even though the x-ray does not show any volume loss or obvious large segments mucous plugging.  Patient would definitely benefit from having a bronchoscopy for attempted extubation because she is doing well on the CPAP and may be a candidate for another trial of liberation from the ventilator if we feel secure that her secretion can be handled effectively and there is not much to start with and hence the potential benefit from bedside bronchoscopy before finalizing the decision on the liberation from the ventilator.  As far as the sensitivity on the culture, the Zosyn is not enough and we will treat with Levaquin.  Patient is on the steroids to help with the wheezing  which did improve significantly but she still have the rhonchi, this has resulted in significant hyperglycemia and we will reduce the dose on the steroid and switch to prednisone p.o. and we will c adjust the dose on the Lantus and continue with the sliding scale insulin  I will continue with the surveillance lab, we will order CBC for today and we will add some antidiarrhea medication if she has no leukocytosis with that diarrhea.  This can be tube feeding related, we will ask nutritionist to review the formula and see if she would benefit from any adjustment.  Per nursing staff they had some difficulties reaching the next of kin for consents, we will try again and if unable we might have to do the bronchoscopy without a consent out of medical necessity.  The anticipated risk from the procedure are going to be very minimal because it is mainly for secretion suctioning and would not planning on doing any biopsies or any major invasive procedure with it.  Discussed with the nursing staff at the bedside, discussed with respiratory therapist    Disposition: Continue to monitor in the CICU, keep on the ventilator    Bryan Cabrera MD  05/07/23  08:45 EDT      Time: Critical care 35 min      Dictated utilizing Dragon dictation

## 2023-05-07 NOTE — OP NOTE
Bronchoscopy Procedure Note    Procedure:  1. Bronchoscopy, Diagnostic  2. Bronchoscopy, Therapeutic    Pre-Operative Diagnosis: Mucous plugging    Post-Operative Diagnosis:  bilateral airway edema with clearance minimal thick secretions    Indication: Diagnostic evaluation for the cause of respiratory failure    Anesthesia: Patient was on IV Precedex drip and received fentanyl and Versed for more optimal control in addition to 1% topical lidocaine    Procedure Details: Patient was consented for the procedure with all risks and benefits of the procedure explained in detail.  Patient was given the opportunity to ask questions and all concerns were answered.  The bronchocope was inserted into the main airway via the endotracheal tube. An anatomical survey was done of the main airways and the subsegmental bronchus.  The findings are reported above.  A washing was performed using aliquots of normal saline instilled into the airways then aspirated back.    Findings:  No mucous plugging but moderate amount of thick clear secretions noted and suctioned and will be sent for culture  Diffuse airway edema bilaterally especially over the lower lobes  ET tube was positioned right at the anahi and the tube was adjusted and pulled back and was around 2+ centimeter above the anahi by the end of the adjustment    Estimated Blood Loss:  None           Specimens: Washing will be sent for cultures                Complications:  None; patient tolerated the procedure well.           Disposition: ICU - intubated and critically ill.      Patient tolerated the procedure well.    While I was in the room and during my examination of the patient I wore gown, gloves, mask, eye protection and washed my hands before and after the encounter.  Proper enhanced droplet precautions and isolation precautions were taken.    Bryan Cabrera MD  5/7/2023  12:56 EDT

## 2023-05-07 NOTE — PROGRESS NOTES
"[facility] Clinical Pharmacy Services: Vancomycin Monitoring Note    Shama Cruz is a 68 y.o. female who is on day 4/7 of pharmacy to dose vancomycin for Sepsis.    Previous Vancomycin Dose:   1000 mg IV every  12  hours  Updated Cultures and Sensitivities:   5/4 MRSA nares: (+)   5/4 Blood Cx: NGx2d  5/4 Urine: >100K GNRs  Results from last 7 days   Lab Units 05/07/23  0530 05/05/23  0610   VANCOMYCIN TR mcg/mL 19.50 15.70     Vitals/Labs  Ht: 162.6 cm (64.02\"); Wt: 70.1 kg (154 lb 8.7 oz)   Temp Readings from Last 1 Encounters:   05/06/23 98.6 °F (37 °C) (Oral)     Estimated Creatinine Clearance: 105 mL/min (A) (by C-G formula based on SCr of 0.5 mg/dL (L)).     Results from last 7 days   Lab Units 05/07/23  0530 05/06/23  0322 05/05/23  0413 05/05/23  0358 05/04/23  0830 05/04/23  0416   CREATININE mg/dL 0.50* 0.38* 0.50*  --    < > 0.84   WBC 10*3/mm3  --  9.01  --  11.61*  --  10.42    < > = values in this interval not displayed.     Assessment/Plan    Current Vancomycin Dose: 1000 mg IV every  12  hours; provides a predicted  mg/L.hr Updating dose to 750 mg IV every  12  hours; provides a predicted  mg/L.hr   Next Level Date and Time: No need for further levels unless renal function changes.  We will continue to monitor patient changes and renal function     Thank you for involving pharmacy in this patient's care. Please contact pharmacy with any questions or concerns.       Ernestina Milton, PharmD  Clinical Pharmacist    "

## 2023-05-08 LAB
ANION GAP SERPL CALCULATED.3IONS-SCNC: 9 MMOL/L (ref 5–15)
ARTERIAL PATENCY WRIST A: POSITIVE
ATMOSPHERIC PRESS: 747 MMHG
BACTERIA SPEC RESP CULT: NORMAL
BASE EXCESS BLDA CALC-SCNC: 1.4 MMOL/L (ref 0–2)
BDY SITE: ABNORMAL
BUN SERPL-MCNC: 26 MG/DL (ref 8–23)
BUN/CREAT SERPL: 45.6 (ref 7–25)
CALCIUM SPEC-SCNC: 8.8 MG/DL (ref 8.6–10.5)
CHLORIDE SERPL-SCNC: 105 MMOL/L (ref 98–107)
CO2 SERPL-SCNC: 24 MMOL/L (ref 22–29)
CREAT SERPL-MCNC: 0.57 MG/DL (ref 0.57–1)
DEPRECATED RDW RBC AUTO: 54.5 FL (ref 37–54)
EGFRCR SERPLBLD CKD-EPI 2021: 99.7 ML/MIN/1.73
ERYTHROCYTE [DISTWIDTH] IN BLOOD BY AUTOMATED COUNT: 15 % (ref 12.3–15.4)
GLUCOSE BLDC GLUCOMTR-MCNC: 214 MG/DL (ref 70–130)
GLUCOSE BLDC GLUCOMTR-MCNC: 259 MG/DL (ref 70–130)
GLUCOSE BLDC GLUCOMTR-MCNC: 286 MG/DL (ref 70–130)
GLUCOSE BLDC GLUCOMTR-MCNC: 291 MG/DL (ref 70–130)
GLUCOSE SERPL-MCNC: 285 MG/DL (ref 65–99)
GRAM STN SPEC: NORMAL
HCO3 BLDA-SCNC: 25.5 MMOL/L (ref 22–28)
HCT VFR BLD AUTO: 27.6 % (ref 34–46.6)
HGB BLD-MCNC: 9.1 G/DL (ref 12–15.9)
INHALED O2 CONCENTRATION: 21 %
MCH RBC QN AUTO: 33.1 PG (ref 26.6–33)
MCHC RBC AUTO-ENTMCNC: 33 G/DL (ref 31.5–35.7)
MCV RBC AUTO: 100.4 FL (ref 79–97)
MODALITY: ABNORMAL
O2 A-A PPRESDIFF RESPIRATORY: 0.7 MMHG
PCO2 BLDA: 37.2 MM HG (ref 35–45)
PEEP RESPIRATORY: 5 CM[H2O]
PH BLDA: 7.44 PH UNITS (ref 7.35–7.45)
PLATELET # BLD AUTO: 137 10*3/MM3 (ref 140–450)
PMV BLD AUTO: 14.3 FL (ref 6–12)
PO2 BLDA: 78.3 MM HG (ref 80–100)
POTASSIUM SERPL-SCNC: 3.9 MMOL/L (ref 3.5–5.2)
PSV: 10 CMH2O
RBC # BLD AUTO: 2.75 10*6/MM3 (ref 3.77–5.28)
SAO2 % BLDCOA: 96 % (ref 92–99)
SODIUM SERPL-SCNC: 138 MMOL/L (ref 136–145)
TOTAL RATE: 22 BREATHS/MINUTE
VENTILATOR MODE: ABNORMAL
WBC NRBC COR # BLD: 8.35 10*3/MM3 (ref 3.4–10.8)

## 2023-05-08 PROCEDURE — 94799 UNLISTED PULMONARY SVC/PX: CPT

## 2023-05-08 PROCEDURE — 36600 WITHDRAWAL OF ARTERIAL BLOOD: CPT

## 2023-05-08 PROCEDURE — 82803 BLOOD GASES ANY COMBINATION: CPT

## 2023-05-08 PROCEDURE — 94640 AIRWAY INHALATION TREATMENT: CPT

## 2023-05-08 PROCEDURE — 63710000001 INSULIN REGULAR HUMAN PER 5 UNITS: Performed by: INTERNAL MEDICINE

## 2023-05-08 PROCEDURE — 80048 BASIC METABOLIC PNL TOTAL CA: CPT | Performed by: INTERNAL MEDICINE

## 2023-05-08 PROCEDURE — 25010000002 FUROSEMIDE PER 20 MG: Performed by: INTERNAL MEDICINE

## 2023-05-08 PROCEDURE — 63710000001 PREDNISONE PER 1 MG: Performed by: INTERNAL MEDICINE

## 2023-05-08 PROCEDURE — 94760 N-INVAS EAR/PLS OXIMETRY 1: CPT

## 2023-05-08 PROCEDURE — 25010000002 HEPARIN (PORCINE) PER 1000 UNITS: Performed by: INTERNAL MEDICINE

## 2023-05-08 PROCEDURE — 85027 COMPLETE CBC AUTOMATED: CPT | Performed by: INTERNAL MEDICINE

## 2023-05-08 PROCEDURE — 94003 VENT MGMT INPAT SUBQ DAY: CPT

## 2023-05-08 PROCEDURE — 82948 REAGENT STRIP/BLOOD GLUCOSE: CPT

## 2023-05-08 PROCEDURE — 94761 N-INVAS EAR/PLS OXIMETRY MLT: CPT

## 2023-05-08 PROCEDURE — 25010000002 VANCOMYCIN 750 MG RECONSTITUTED SOLUTION 1 EACH VIAL: Performed by: INTERNAL MEDICINE

## 2023-05-08 RX ORDER — LEVOFLOXACIN 750 MG/1
750 TABLET ORAL EVERY 24 HOURS
Status: COMPLETED | OUTPATIENT
Start: 2023-05-08 | End: 2023-05-11

## 2023-05-08 RX ORDER — AMLODIPINE BESYLATE 10 MG/1
10 TABLET ORAL DAILY
Status: DISCONTINUED | OUTPATIENT
Start: 2023-05-08 | End: 2023-05-17 | Stop reason: HOSPADM

## 2023-05-08 RX ORDER — PREDNISONE 10 MG/1
10 TABLET ORAL DAILY
Status: DISCONTINUED | OUTPATIENT
Start: 2023-05-09 | End: 2023-05-10

## 2023-05-08 RX ORDER — FAMOTIDINE 20 MG/1
20 TABLET, FILM COATED ORAL 2 TIMES DAILY
Status: DISCONTINUED | OUTPATIENT
Start: 2023-05-08 | End: 2023-05-16

## 2023-05-08 RX ORDER — IPRATROPIUM BROMIDE AND ALBUTEROL SULFATE 2.5; .5 MG/3ML; MG/3ML
3 SOLUTION RESPIRATORY (INHALATION) ONCE
Status: COMPLETED | OUTPATIENT
Start: 2023-05-08 | End: 2023-05-08

## 2023-05-08 RX ORDER — FUROSEMIDE 10 MG/ML
40 INJECTION INTRAMUSCULAR; INTRAVENOUS ONCE
Status: COMPLETED | OUTPATIENT
Start: 2023-05-08 | End: 2023-05-08

## 2023-05-08 RX ORDER — LOPERAMIDE HCL 1 MG/7.5ML
2 SOLUTION ORAL 4 TIMES DAILY PRN
Status: DISCONTINUED | OUTPATIENT
Start: 2023-05-08 | End: 2023-05-17 | Stop reason: HOSPADM

## 2023-05-08 RX ADMIN — CASTOR OIL AND BALSAM, PERU 1 APPLICATION: 788; 87 OINTMENT TOPICAL at 21:46

## 2023-05-08 RX ADMIN — HEPARIN SODIUM 5000 UNITS: 5000 INJECTION INTRAVENOUS; SUBCUTANEOUS at 23:35

## 2023-05-08 RX ADMIN — AMLODIPINE BESYLATE 10 MG: 10 TABLET ORAL at 21:46

## 2023-05-08 RX ADMIN — MUPIROCIN 1 APPLICATION: 20 OINTMENT TOPICAL at 08:31

## 2023-05-08 RX ADMIN — ANORECTAL OINTMENT 1 APPLICATION: 15.7; .44; 24; 20.6 OINTMENT TOPICAL at 08:34

## 2023-05-08 RX ADMIN — FUROSEMIDE 40 MG: 10 INJECTION, SOLUTION INTRAMUSCULAR; INTRAVENOUS at 08:41

## 2023-05-08 RX ADMIN — VANCOMYCIN HYDROCHLORIDE 750 MG: 750 INJECTION, POWDER, LYOPHILIZED, FOR SOLUTION INTRAVENOUS at 06:03

## 2023-05-08 RX ADMIN — Medication 10 ML: at 21:47

## 2023-05-08 RX ADMIN — INSULIN GLARGINE-YFGN 20 UNITS: 100 INJECTION, SOLUTION SUBCUTANEOUS at 08:13

## 2023-05-08 RX ADMIN — IPRATROPIUM BROMIDE AND ALBUTEROL SULFATE 3 ML: .5; 2.5 SOLUTION RESPIRATORY (INHALATION) at 08:00

## 2023-05-08 RX ADMIN — DEXMEDETOMIDINE HYDROCHLORIDE 0.3 MCG/KG/HR: 4 INJECTION, SOLUTION INTRAVENOUS at 01:16

## 2023-05-08 RX ADMIN — HEPARIN SODIUM 5000 UNITS: 5000 INJECTION INTRAVENOUS; SUBCUTANEOUS at 05:56

## 2023-05-08 RX ADMIN — FAMOTIDINE 20 MG: 20 TABLET ORAL at 21:46

## 2023-05-08 RX ADMIN — NYSTATIN: 100000 POWDER TOPICAL at 21:48

## 2023-05-08 RX ADMIN — PREDNISONE 20 MG: 20 TABLET ORAL at 08:13

## 2023-05-08 RX ADMIN — INSULIN HUMAN 12 UNITS: 100 INJECTION, SOLUTION PARENTERAL at 06:02

## 2023-05-08 RX ADMIN — NYSTATIN: 100000 POWDER TOPICAL at 08:31

## 2023-05-08 RX ADMIN — MUPIROCIN 1 APPLICATION: 20 OINTMENT TOPICAL at 21:48

## 2023-05-08 RX ADMIN — CASTOR OIL AND BALSAM, PERU 1 APPLICATION: 788; 87 OINTMENT TOPICAL at 08:13

## 2023-05-08 RX ADMIN — FAMOTIDINE 20 MG: 10 INJECTION INTRAVENOUS at 08:12

## 2023-05-08 RX ADMIN — CHLORHEXIDINE GLUCONATE 0.12% ORAL RINSE 15 ML: 1.2 LIQUID ORAL at 08:12

## 2023-05-08 RX ADMIN — CHLORHEXIDINE GLUCONATE 0.12% ORAL RINSE 15 ML: 1.2 LIQUID ORAL at 21:46

## 2023-05-08 RX ADMIN — LEVOFLOXACIN 750 MG: 750 TABLET, FILM COATED ORAL at 11:15

## 2023-05-08 RX ADMIN — HEPARIN SODIUM 5000 UNITS: 5000 INJECTION INTRAVENOUS; SUBCUTANEOUS at 14:18

## 2023-05-08 RX ADMIN — Medication 10 ML: at 08:13

## 2023-05-08 RX ADMIN — INSULIN HUMAN 8 UNITS: 100 INJECTION, SOLUTION PARENTERAL at 17:57

## 2023-05-08 RX ADMIN — INSULIN HUMAN 12 UNITS: 100 INJECTION, SOLUTION PARENTERAL at 11:28

## 2023-05-08 RX ADMIN — ANORECTAL OINTMENT 1 APPLICATION: 15.7; .44; 24; 20.6 OINTMENT TOPICAL at 21:48

## 2023-05-08 RX ADMIN — CASTOR OIL AND BALSAM, PERU 1 APPLICATION: 788; 87 OINTMENT TOPICAL at 21:48

## 2023-05-08 RX ADMIN — VANCOMYCIN HYDROCHLORIDE 750 MG: 750 INJECTION, POWDER, LYOPHILIZED, FOR SOLUTION INTRAVENOUS at 17:49

## 2023-05-08 NOTE — PLAN OF CARE
Goal Outcome Evaluation:  Plan of Care Reviewed With: patient        Progress: no change     Pt remains in CICU on the vent alert and following commands. Pt had 1100 UOP for the shift. VSS will continue to monitor.

## 2023-05-08 NOTE — PROGRESS NOTES
"Nutrition Services    Patient Name:  Shama Cruz  YOB: 1955  MRN: [id number]  Admit Date:  5/4/2023  Assessment Date:  05/08/23    CLINICAL NUTRITION ASSESSMENT    Comments: TF follow up    Pt tolerating Diabetisource AC @ 65ml/hr and water 83ulz6oz via PEG plus Gurmeet BID  (Provides: 1872kcals plus gurmeet, 93gm protein, 1279ml fluids plus water flushes)    Current labs:  Glu 259, BUN 26.  Skin: DTPI to coccyx and L heel, stg II PI to R ear.  Pt with FMS. Pr with diarrhea. MD ordered Imodium  Pt was extubated this morning.     Plan/Recommend:  1. Continue Diabetisource AC to 65ml/hr with Free water 50ml q 2h     2. Add Gurmeet BID to support wound healing.     3. Monitor labs, tolerance, bowel function.     RD to follow clinical course and make further recommendations as warranted.     Encounter Information         Reason for Encounter TF follow up    Admitting Diagnosis Sepsis, acute hypoxic respiratory failure, hypernatremia, dementia, hx/o diabetes        Current Issues extubated. PEG tube in place.      Current Nutrition Orders & Evaluation of Intake       Oral Nutrition     Food Allergies NFFA   Current PO Diet NPO Diet NPO Type: Strict NPO   Supplement    PO Evaluation NPO/no intake     % PO Intake    --  Anthropometrics          Height    Weight Height: 162.6 cm (64.02\")  Weight: 71.1 kg (156 lb 12 oz) (05/08/23 0600)    BMI kg/m2 Body mass index is 26.89 kg/m².    BMI Category Overweight (25 - 29.9)    Weight History  Wt Readings from Last 15 Encounters:   05/08/23 0600 71.1 kg (156 lb 12 oz)   05/08/23 0300 71.1 kg (156 lb 12 oz)   05/07/23 0443 70.1 kg (154 lb 8.7 oz)   05/06/23 0400 69.5 kg (153 lb 3.5 oz)   05/05/23 0600 68.4 kg (150 lb 12.7 oz)   05/04/23 2236 68.4 kg (150 lb 12.7 oz)   05/04/23 0454 90.7 kg (200 lb)        Estimated/Assessed Needs        Energy Requirements    Weight for Calculation 68.4kg   Method for Estimation  25 kcal/kg, 30 kcal/kg   EST Needs (kcal/day) 0301-2642 "        Protein Requirements    Weight for Calculation 68.4kg    EST Protein Needs (g/kg) 1.2 - 1.5 gm/kg   EST Daily Needs (g/day) 82-103g        Fluid Requirements     Method for Estimation 1 mL/kcal    Estimated Needs (mL/day) 1710         Physical Findings          Physical Appearance obese, overweight, on oxygen therapy, sedate   Oral/Mouth Cavity tooth or teeth missing   Edema  generalized, 3+ (moderate)   Gastrointestinal last bowel movement: 5/8 liquid   Skin  pressure injury coccyx, right ear   Tubes/Drains/Lines PEG   NFPE No clinical signs of muscle wasting or fat loss (only visualized from chest up)     Labs        Pertinent Labs Reviewed, listed below     Results from last 7 days   Lab Units 05/08/23 0328 05/07/23 0530 05/06/23  2326 05/06/23 0322 05/05/23 0413 05/04/23  0830 05/04/23  0416   SODIUM mmol/L 138  --   --  137 142   < > 149*   POTASSIUM mmol/L 3.9  --  4.6 2.9* 4.1   < > 5.0   CHLORIDE mmol/L 105  --   --  107 112*   < > 111*   CO2 mmol/L 24.0  --   --  19.6* 21.0*   < > 24.6   BUN mg/dL 26*  --   --  17 25*   < > 42*   CREATININE mg/dL 0.57 0.50*  --  0.38* 0.50*   < > 0.84   CALCIUM mg/dL 8.8  --   --  6.9* 7.0*   < > 9.0   BILIRUBIN mg/dL  --   --   --   --   --   --  0.3   ALK PHOS U/L  --   --   --   --   --   --  39   ALT (SGPT) U/L  --   --   --   --   --   --  14   AST (SGOT) U/L  --   --   --   --   --   --  36*   GLUCOSE mg/dL 285*  --   --  195* 189*   < > 195*    < > = values in this interval not displayed.     Results from last 7 days   Lab Units 05/08/23 0328 05/06/23 0322 05/05/23  0413 05/05/23  0358 05/04/23  1226   MAGNESIUM mg/dL  --   --  2.0  --   --    PHOSPHORUS mg/dL  --   --   --   --  2.7   HEMOGLOBIN g/dL 9.1*   < >  --    < >  --    HEMATOCRIT % 27.6*   < >  --    < >  --    WBC 10*3/mm3 8.35   < >  --    < >  --    ALBUMIN g/dL  --   --   --   --  3.1*    < > = values in this interval not displayed.     Results from last 7 days   Lab Units 05/08/23  9685  05/07/23  1138 05/06/23  0322 05/05/23  0358 05/04/23  0416   PLATELETS 10*3/mm3 137* 113* 84* 85* 98*     No results found for: COVID19  No results found for: HGBA1C       Medications            Scheduled Medications acetaminophen, 1,000 mg, Oral, Once  castor oil-balsam peru, 1 application, Topical, Q12H  castor oil-balsam peru, 1 application, Topical, Q12H  chlorhexidine, 15 mL, Mouth/Throat, Q12H  famotidine, 20 mg, Oral, BID  heparin (porcine), 5,000 Units, Subcutaneous, Q8H  [START ON 5/9/2023] insulin glargine, 30 Units, Subcutaneous, Daily  insulin regular, 4-24 Units, Subcutaneous, Q6H  levoFLOXacin, 750 mg, Per G Tube, Q24H  Menthol-Zinc Oxide, 1 application, Topical, BID  mupirocin, 1 application, Topical, Q12H  nystatin, , Topical, Q12H  [START ON 5/9/2023] predniSONE, 10 mg, Oral, Daily  sodium chloride, 30 mL/kg, Intravenous, Once  sodium chloride, 500 mL, Intravenous, Once  sodium chloride, 10 mL, Intravenous, Q12H  vancomycin, 750 mg, Intravenous, Q12H        Infusions dexmedetomidine, 0.2-1.5 mcg/kg/hr, Last Rate: Stopped (05/08/23 0910)  norepinephrine, 0.02-0.3 mcg/kg/min  Pharmacy to dose vancomycin,         PRN Medications •  dextrose  •  dextrose  •  glucagon (human recombinant)  •  loperamide  •  Pharmacy to dose vancomycin  •  potassium chloride **OR** potassium chloride **OR** potassium chloride  •  sodium chloride  •  sodium chloride     PES STATEMENT / NUTRITION DIAGNOSIS      Nutrition Dx Problem  Problem: Needs Alternative Route  Etiology: Medical Diagnosis Dysphagia, Resp failure/vent  Signs/Symptoms: NPO    Comment: PEG tube in place     PLAN OF CARE  Intervention Goal        Intervention Goal(s) Nutrition support treatment, Meet estimated needs, Disease management/therapy, Initiate TF/PN, Tolerate TF/PN at goal and No significant weight loss     Nutrition Intervention         RD Action Follow Tx Progress, Care plan reviewed and Recommend/ordered:      Nutrition Prescription           Recommendation       Enteral Prescription:     Enteral Route PEG    TF Delivery Method Continuous    Enteral Product Diabetisource AC    Modular Gurmeet, BID    Propofol Rate/Kcal -    TF Start Rate (mL/hr) 40    TF Goal Rate (mL/hr) 65    Free Water Flush (mL) 50ml q 2h    TF Provision at Goal:  1872kcal, 94 gm protein, 1279 mL free water + 600 mL water flushes         Calories 100 % needs met         Protein  100 % needs met         Fluid (mL) 1879 mL total     Prescription Ordered Yes     Monitor/Evaluation        Monitor Per protocol     RD to follow up per protocol.    Electronically signed by:  Ana Paula Hinkle RD  05/08/23 15:23 EDT

## 2023-05-08 NOTE — PLAN OF CARE
Problem: Adult Inpatient Plan of Care  Goal: Plan of Care Review  Outcome: Ongoing, Progressing  Flowsheets (Taken 5/8/2023 1740)  Progress: no change  Plan of Care Reviewed With: patient  Outcome Evaluation: In CICU. Extubated. On 1LNC. Restraints removed. Lasix x1. F/C in place. Wound care done per orders.

## 2023-05-08 NOTE — PROGRESS NOTES
PROGRESS NOTE  Patient Name: Shama Cruz  Age/Sex: 68 y.o. female  : 1955  MRN: [id number]    Date of Admission: 2023  Date of Encounter Visit: 23   LOS: 4 days   Patient Care Team:  Archie Murray MD as PCP - General (Pulmonary Disease)    Chief Complaint: Respiratory failure    Hospital course: Patient came in with respiratory failure, and sepsis, had a positive urine culture and only positive MRSA screen with no respiratory culture in the system.  She was started on Zosyn, urine sensitivity just came back showing Klebsiella aeruginosa that was resistant to Zosyn and patient was transitioned to Levaquin on 2023.  Patient already failed extubation once because that was unable to handle her secretion, she does have diarrhea but no leukocytosis.  Bronchoscopy done on 2003 did not show any significant retained secretion rather she had some bilateral airway edema  She w did tolerate CPAP but the wheezing and her history of failed prior extubation were the reason for the delay in liberation from the ventilator and we might have a better wanted to do that today      REVIEW OF SYSTEMS:   CONSTITUTIONAL: no fever or chills  Otherwise limited system review.     Ventilator/Non-Invasive Ventilation Settings (From admission, onward)     Start     Ordered    23 1653  Ventilator - AC/VC; Other; 18; 30%; 5; 500  Continuous        Question Answer Comment   Vent Mode AC/VC    Rate Other    Rate 18    FiO2 30%    PEEP 5    Flow Rate 500        23 1652                  Vital Signs  Temp:  [98 °F (36.7 °C)-98.9 °F (37.2 °C)] 98.9 °F (37.2 °C)  Heart Rate:  [59-77] 71  Resp:  [12-24] 24  BP: (100-140)/(44-89) 112/66  FiO2 (%):  [21 %-100 %] 21 %  SpO2:  [95 %-100 %] 99 %  on    Device (Oxygen Therapy): ventilator    Intake/Output Summary (Last 24 hours) at 2023 0836  Last data filed at 2023 0603  Gross per 24 hour   Intake 2296 ml   Output 1850 ml   Net 446 ml     Flowsheet Rows   "  Flowsheet Row First Filed Value   Admission Height 162.6 cm (64\") Documented at 05/04/2023 0454   Admission Weight 90.7 kg (200 lb) Documented at 05/04/2023 0454        Body mass index is 26.89 kg/m².      05/07/23  0443 05/08/23  0300 05/08/23  0600   Weight: 70.1 kg (154 lb 8.7 oz) 71.1 kg (156 lb 12 oz) 71.1 kg (156 lb 12 oz)       Physical Exam:  GEN:   orally intubated, sedated but arousable, on the ventilator  EYES:   Sclerae clear. No icterus. PERRL. Normal EOM  ENT:   External ears/nose normal, no oral lesions, no thrush, mucous membranes moist, oral ET tube  NECK:  Supple, midline trachea, no JVD  LUNGS: Normal chest on inspection, breathing in sync with the ventilator, minimal expiratory wheezes, no crackles or rhonchi.  Breathing in sync with the ventilator with good lung compliance.   CV:  Regular rhythm and rate. Normal S1/S2. No murmurs, gallops, or rubs noted.  ABD:  Soft, nontender and nondistended. Normal bowel sounds. No guarding.  PEG tube site is clean with no discharge or erythema  EXT: Patient was not moving the right upper extremity as well same for the right lower extremity, she does follow commands on the left. No cyanosis. No redness.  1+  edema.   Skin: Dry, intact, no bleeding    Results Review:    Results From Last 14 Days   Lab Units 05/05/23  0610 05/04/23 2328 05/04/23  1655   LACTATE mmol/L 2.7* 2.7* 3.6*     Results from last 7 days   Lab Units 05/08/23  0328 05/07/23  0530 05/06/23  2326 05/06/23  0322 05/05/23  0413 05/04/23  1226 05/04/23  0830 05/04/23  0416   SODIUM mmol/L 138  --   --  137 142 150* 150* 149*   POTASSIUM mmol/L 3.9  --  4.6 2.9* 4.1 3.0* 3.5 5.0   CHLORIDE mmol/L 105  --   --  107 112* 113* 115* 111*   CO2 mmol/L 24.0  --   --  19.6* 21.0* 23.7 24.0 24.6   BUN mg/dL 26*  --   --  17 25* 31* 34* 42*   CREATININE mg/dL 0.57 0.50*  --  0.38* 0.50* 0.54* 0.66 0.84   CALCIUM mg/dL 8.8  --   --  6.9* 7.0* 7.9* 8.1* 9.0   AST (SGOT) U/L  --   --   --   --   --   -- "   --  36*   ALT (SGPT) U/L  --   --   --   --   --   --   --  14   ANION GAP mmol/L 9.0  --   --  10.4 9.0 13.3 11.0 13.4   ALBUMIN g/dL  --   --   --   --   --  3.1*  --  3.0*     Results from last 7 days   Lab Units 05/04/23  0830 05/04/23  0416   HSTROP T ng/L 29* 41*             Results from last 7 days   Lab Units 05/08/23  0328 05/07/23  1138 05/06/23  0322 05/05/23  0358 05/04/23 0416   WBC 10*3/mm3 8.35 10.98* 9.01 11.61* 10.42   HEMOGLOBIN g/dL 9.1* 9.2* 8.5* 9.5* 11.0*   HEMATOCRIT % 27.6* 26.7* 24.1* 28.4* 33.8*   PLATELETS 10*3/mm3 137* 113* 84* 85* 98*   MCV fL 100.4* 96.7 95.3 99.3* 100.3*   NEUTROPHIL % %  --  88.9* 78.1* 78.1* 78.4*   LYMPHOCYTE % %  --  5.8* 13.9* 14.1* 13.7*   MONOCYTES % %  --  3.8* 4.4* 5.4 7.0   EOSINOPHIL % %  --  0.0* 2.8 1.6 0.0*   BASOPHIL % %  --  0.1 0.2 0.3 0.2         Results from last 7 days   Lab Units 05/05/23  0413   MAGNESIUM mg/dL 2.0           Invalid input(s): LDLCALC  Results from last 7 days   Lab Units 05/07/23  0341 05/06/23  0308 05/05/23  1209 05/05/23  0804 05/05/23  0328 05/04/23  0304   PH, ARTERIAL pH units 7.445 7.465* 7.416 7.407 7.400 7.373   PCO2, ARTERIAL mm Hg 33.6* 31.5* 33.0* 35.1 32.8* 51.0*   PO2 ART mm Hg 81.2 83.8 101.7* 93.8 92.8 463.2*   HCO3 ART mmol/L 23.1 22.7 21.3* 22.1 20.3* 29.7*         Glucose   Date/Time Value Ref Range Status   05/08/2023 0533 286 (H) 70 - 130 mg/dL Final     Comment:     Meter: [id number] : [id number] Levy STRONG   05/07/2023 2348 338 (H) 70 - 130 mg/dL Final     Comment:     Meter: [id number] : [id number] Levy Lion    05/07/2023 1722 314 (H) 70 - 130 mg/dL Final     Comment:     Meter: [id number] : [id number] Nguyễn Lucas RN   05/07/2023 1156 328 (H) 70 - 130 mg/dL Final     Comment:     Meter: [id number] : [id number] Nguyễn Lucas    05/07/2023 0529 348 (H) 70 - 130 mg/dL Final     Comment:     Meter: [id number] : [id number] Shannon Caicedo    05/07/2023 0527 326 (H)  70 - 130 mg/dL Final     Comment:     Meter: [id number] : [id number] Shannon Caicedo RN   05/06/2023 2319 367 (H) 70 - 130 mg/dL Final     Comment:     Meter: [id number] : [id number] Shannon Caicedo RN   05/06/2023 1637 349 (H) 70 - 130 mg/dL Final     Comment:     Meter: [id number] : [id number] Nguyễn Lucas RN     Results from last 7 days   Lab Units 05/05/23  0610 05/04/23  2328 05/04/23  1655 05/04/23  1226 05/04/23  0830 05/04/23  0416   PROCALCITONIN ng/mL  --   --   --   --   --  0.33*   LACTATE mmol/L 2.7* 2.7* 3.6* 3.7* 3.6* 5.1*     Results from last 7 days   Lab Units 05/06/23  0940 05/04/23  0440 05/04/23  0428 05/04/23  0423   BLOODCX   --  No growth at 4 days  --  No growth at 4 days   RESPCX  No growth  --   --   --    URINECX   --   --  >100,000 CFU/mL Klebsiella aerogenes*  --      Results from last 7 days   Lab Units 05/04/23  0428   NITRITE UA  Negative   WBC UA /HPF 6-12*   BACTERIA UA /HPF 2+*   SQUAM EPITHEL UA /HPF 0-2   URINECX  >100,000 CFU/mL Klebsiella aerogenes*                   Imaging:   Imaging Results (All)     Procedure Component Value Units Date/Time       I reviewed the patient's new clinical results.  I personally viewed and interpreted the patient's imaging results:        Medication Review:   acetaminophen, 1,000 mg, Oral, Once  castor oil-balsam peru, 1 application, Topical, Q12H  castor oil-balsam peru, 1 application, Topical, Q12H  chlorhexidine, 15 mL, Mouth/Throat, Q12H  famotidine, 20 mg, Oral, BID  heparin (porcine), 5,000 Units, Subcutaneous, Q8H  insulin glargine, 20 Units, Subcutaneous, Daily  insulin regular, 4-24 Units, Subcutaneous, Q6H  levoFLOXacin, 750 mg, Per G Tube, Q24H  Menthol-Zinc Oxide, 1 application, Topical, BID  mupirocin, 1 application, Topical, Q12H  nystatin, , Topical, Q12H  predniSONE, 20 mg, Oral, Daily  sodium chloride, 30 mL/kg, Intravenous, Once  sodium chloride, 500 mL, Intravenous, Once  sodium chloride, 10 mL, Intravenous,  Q12H  vancomycin, 750 mg, Intravenous, Q12H        dexmedetomidine, 0.2-1.5 mcg/kg/hr, Last Rate: 0.3 mcg/kg/hr (05/08/23 0116)  norepinephrine, 0.02-0.3 mcg/kg/min  Pharmacy to dose vancomycin,         ASSESSMENT:   1. Acute hypoxemic respiratory failure, failed extubation and was reintubated on 5/5/2023  2. Sepsis  3. Pneumonia, on Zosyn/VANC, positive MRSA screen  4. UTI -updated culture results is now showing Klebsiella aerogenes, Resistant to Zosyn but susceptible to cefepime, sensitive to quinolones and to aminoglycosides and to Bactrim  5. MRSA swab positive however patient has no infiltrate  6. Lactic acidosis   7. Hypokalemia   8. Mild hypernatremia  9. Diabetes with hyperglycemia, worsened by steroids  10. Dementia  11. Management of mechanical ventilation.  12. Thrombocytopenia, platelet counts in the 80s and has been stable  13. Status post PEG  14. 3 cm indeterminate right adrenal nodule. - outpt follow up  15. History of prior stroke    PLAN:  Patient is on the proper modified antibiotic regimen given the sensitivity on her Klebsiella cultures, and she is also on the vancomycin for the positive MRSA screen.  Respiratory cultures were done after she was on the  vancomycin for 2 days and are not reliable to rule out MRSA infection so we decided to go with the nasal swab PCR screen.  Steroid dose were reduced but she still having significant hyperglycemia, she is tolerating tube feeding  Her fluid balance has been positive for 6 L retention over the last several days and with the edema on the bronchoscopy, patient may benefit from diuretics  She is afebrile with normalization of the white blood cell count    Summary of recommendations:  · IV Lasix today  · Weaning trial and consider liberation from the ventilator today  · Continue with Levaquin and vancomycin  · She feeding as tolerated  · Continue to monitor in the CICU  · Increase Lantus dose  · PT/OT after extubation  · Start antidiarrhea  medication      Addendum:  Weaning  parameters showed favorable numbers, we will give the Lasix some time and then extubate afterwards    Disposition: Continue to monitor in the CICU, keep on the ventilator    Bryan Cabrera MD  05/08/23  08:36 EDT      Time: Critical care 36 min      Dictated utilizing Dragon dictation

## 2023-05-09 LAB
ANION GAP SERPL CALCULATED.3IONS-SCNC: 15 MMOL/L (ref 5–15)
BACTERIA SPEC AEROBE CULT: NORMAL
BACTERIA SPEC AEROBE CULT: NORMAL
BACTERIA SPEC RESP CULT: NO GROWTH
BUN SERPL-MCNC: 31 MG/DL (ref 8–23)
BUN/CREAT SERPL: 59.6 (ref 7–25)
CALCIUM SPEC-SCNC: 8.3 MG/DL (ref 8.6–10.5)
CHLORIDE SERPL-SCNC: 97 MMOL/L (ref 98–107)
CO2 SERPL-SCNC: 23 MMOL/L (ref 22–29)
CREAT SERPL-MCNC: 0.52 MG/DL (ref 0.57–1)
DEPRECATED RDW RBC AUTO: 53.2 FL (ref 37–54)
EGFRCR SERPLBLD CKD-EPI 2021: 102 ML/MIN/1.73
ERYTHROCYTE [DISTWIDTH] IN BLOOD BY AUTOMATED COUNT: 14.8 % (ref 12.3–15.4)
GLUCOSE BLDC GLUCOMTR-MCNC: 188 MG/DL (ref 70–130)
GLUCOSE BLDC GLUCOMTR-MCNC: 231 MG/DL (ref 70–130)
GLUCOSE BLDC GLUCOMTR-MCNC: 285 MG/DL (ref 70–130)
GLUCOSE BLDC GLUCOMTR-MCNC: 305 MG/DL (ref 70–130)
GLUCOSE SERPL-MCNC: 244 MG/DL (ref 65–99)
GRAM STN SPEC: NORMAL
GRAM STN SPEC: NORMAL
HCT VFR BLD AUTO: 32.7 % (ref 34–46.6)
HGB BLD-MCNC: 11.2 G/DL (ref 12–15.9)
MAGNESIUM SERPL-MCNC: 2 MG/DL (ref 1.6–2.4)
MCH RBC QN AUTO: 33.2 PG (ref 26.6–33)
MCHC RBC AUTO-ENTMCNC: 34.3 G/DL (ref 31.5–35.7)
MCV RBC AUTO: 97 FL (ref 79–97)
PLATELET # BLD AUTO: 201 10*3/MM3 (ref 140–450)
PMV BLD AUTO: 12.9 FL (ref 6–12)
POTASSIUM SERPL-SCNC: 3 MMOL/L (ref 3.5–5.2)
POTASSIUM SERPL-SCNC: 4.6 MMOL/L (ref 3.5–5.2)
QT INTERVAL: 306 MS
RBC # BLD AUTO: 3.37 10*6/MM3 (ref 3.77–5.28)
SODIUM SERPL-SCNC: 135 MMOL/L (ref 136–145)
WBC NRBC COR # BLD: 13.34 10*3/MM3 (ref 3.4–10.8)

## 2023-05-09 PROCEDURE — 93005 ELECTROCARDIOGRAM TRACING: CPT | Performed by: INTERNAL MEDICINE

## 2023-05-09 PROCEDURE — 25010000002 VANCOMYCIN 750 MG RECONSTITUTED SOLUTION 1 EACH VIAL: Performed by: INTERNAL MEDICINE

## 2023-05-09 PROCEDURE — 82948 REAGENT STRIP/BLOOD GLUCOSE: CPT

## 2023-05-09 PROCEDURE — 93010 ELECTROCARDIOGRAM REPORT: CPT | Performed by: INTERNAL MEDICINE

## 2023-05-09 PROCEDURE — 63710000001 INSULIN REGULAR HUMAN PER 5 UNITS: Performed by: INTERNAL MEDICINE

## 2023-05-09 PROCEDURE — 85027 COMPLETE CBC AUTOMATED: CPT | Performed by: INTERNAL MEDICINE

## 2023-05-09 PROCEDURE — 83735 ASSAY OF MAGNESIUM: CPT | Performed by: INTERNAL MEDICINE

## 2023-05-09 PROCEDURE — 80048 BASIC METABOLIC PNL TOTAL CA: CPT | Performed by: INTERNAL MEDICINE

## 2023-05-09 PROCEDURE — 63710000001 PREDNISONE PER 5 MG: Performed by: INTERNAL MEDICINE

## 2023-05-09 PROCEDURE — 25010000002 HEPARIN (PORCINE) PER 1000 UNITS: Performed by: INTERNAL MEDICINE

## 2023-05-09 PROCEDURE — 84132 ASSAY OF SERUM POTASSIUM: CPT | Performed by: INTERNAL MEDICINE

## 2023-05-09 RX ORDER — LABETALOL HYDROCHLORIDE 5 MG/ML
20 INJECTION, SOLUTION INTRAVENOUS EVERY 4 HOURS PRN
Status: DISCONTINUED | OUTPATIENT
Start: 2023-05-09 | End: 2023-05-17 | Stop reason: HOSPADM

## 2023-05-09 RX ORDER — METOPROLOL TARTRATE 50 MG/1
50 TABLET, FILM COATED ORAL EVERY 12 HOURS SCHEDULED
Status: DISCONTINUED | OUTPATIENT
Start: 2023-05-09 | End: 2023-05-10

## 2023-05-09 RX ORDER — FUROSEMIDE 20 MG/1
20 TABLET ORAL DAILY
Status: DISCONTINUED | OUTPATIENT
Start: 2023-05-09 | End: 2023-05-17 | Stop reason: HOSPADM

## 2023-05-09 RX ORDER — POTASSIUM CHLORIDE 1.5 G/1.77G
20 POWDER, FOR SOLUTION ORAL DAILY
Status: DISCONTINUED | OUTPATIENT
Start: 2023-05-09 | End: 2023-05-17 | Stop reason: HOSPADM

## 2023-05-09 RX ADMIN — POTASSIUM CHLORIDE 20 MEQ: 1.5 POWDER, FOR SOLUTION ORAL at 11:17

## 2023-05-09 RX ADMIN — Medication 10 ML: at 08:53

## 2023-05-09 RX ADMIN — ANORECTAL OINTMENT 1 APPLICATION: 15.7; .44; 24; 20.6 OINTMENT TOPICAL at 08:54

## 2023-05-09 RX ADMIN — LEVOFLOXACIN 750 MG: 750 TABLET, FILM COATED ORAL at 10:41

## 2023-05-09 RX ADMIN — CASTOR OIL AND BALSAM, PERU 1 APPLICATION: 788; 87 OINTMENT TOPICAL at 20:35

## 2023-05-09 RX ADMIN — METOPROLOL TARTRATE 50 MG: 50 TABLET, FILM COATED ORAL at 20:35

## 2023-05-09 RX ADMIN — INSULIN HUMAN 16 UNITS: 100 INJECTION, SOLUTION PARENTERAL at 05:35

## 2023-05-09 RX ADMIN — Medication 10 ML: at 20:37

## 2023-05-09 RX ADMIN — FUROSEMIDE 20 MG: 20 TABLET ORAL at 11:17

## 2023-05-09 RX ADMIN — CHLORHEXIDINE GLUCONATE 0.12% ORAL RINSE 15 ML: 1.2 LIQUID ORAL at 20:38

## 2023-05-09 RX ADMIN — CASTOR OIL AND BALSAM, PERU 1 APPLICATION: 788; 87 OINTMENT TOPICAL at 08:52

## 2023-05-09 RX ADMIN — VANCOMYCIN HYDROCHLORIDE 750 MG: 750 INJECTION, POWDER, LYOPHILIZED, FOR SOLUTION INTRAVENOUS at 20:04

## 2023-05-09 RX ADMIN — METOPROLOL TARTRATE 50 MG: 50 TABLET, FILM COATED ORAL at 10:41

## 2023-05-09 RX ADMIN — INSULIN HUMAN 4 UNITS: 100 INJECTION, SOLUTION PARENTERAL at 18:00

## 2023-05-09 RX ADMIN — HEPARIN SODIUM 5000 UNITS: 5000 INJECTION INTRAVENOUS; SUBCUTANEOUS at 20:41

## 2023-05-09 RX ADMIN — MUPIROCIN 1 APPLICATION: 20 OINTMENT TOPICAL at 20:36

## 2023-05-09 RX ADMIN — CASTOR OIL AND BALSAM, PERU 1 APPLICATION: 788; 87 OINTMENT TOPICAL at 08:53

## 2023-05-09 RX ADMIN — AMLODIPINE BESYLATE 10 MG: 10 TABLET ORAL at 08:51

## 2023-05-09 RX ADMIN — INSULIN GLARGINE-YFGN 30 UNITS: 100 INJECTION, SOLUTION SUBCUTANEOUS at 08:52

## 2023-05-09 RX ADMIN — HEPARIN SODIUM 5000 UNITS: 5000 INJECTION INTRAVENOUS; SUBCUTANEOUS at 14:00

## 2023-05-09 RX ADMIN — NYSTATIN: 100000 POWDER TOPICAL at 20:37

## 2023-05-09 RX ADMIN — INSULIN HUMAN 12 UNITS: 100 INJECTION, SOLUTION PARENTERAL at 00:54

## 2023-05-09 RX ADMIN — INSULIN HUMAN 12 UNITS: 100 INJECTION, SOLUTION PARENTERAL at 12:20

## 2023-05-09 RX ADMIN — POTASSIUM CHLORIDE 40 MEQ: 1.5 POWDER, FOR SOLUTION ORAL at 13:04

## 2023-05-09 RX ADMIN — INSULIN GLARGINE-YFGN 10 UNITS: 100 INJECTION, SOLUTION SUBCUTANEOUS at 10:41

## 2023-05-09 RX ADMIN — FAMOTIDINE 20 MG: 20 TABLET ORAL at 20:35

## 2023-05-09 RX ADMIN — PREDNISONE 10 MG: 10 TABLET ORAL at 08:51

## 2023-05-09 RX ADMIN — FAMOTIDINE 20 MG: 20 TABLET ORAL at 08:57

## 2023-05-09 RX ADMIN — POTASSIUM CHLORIDE 40 MEQ: 1.5 POWDER, FOR SOLUTION ORAL at 08:52

## 2023-05-09 RX ADMIN — VANCOMYCIN HYDROCHLORIDE 750 MG: 750 INJECTION, POWDER, LYOPHILIZED, FOR SOLUTION INTRAVENOUS at 05:34

## 2023-05-09 RX ADMIN — ANORECTAL OINTMENT 1 APPLICATION: 15.7; .44; 24; 20.6 OINTMENT TOPICAL at 20:36

## 2023-05-09 RX ADMIN — CASTOR OIL AND BALSAM, PERU 1 APPLICATION: 788; 87 OINTMENT TOPICAL at 20:36

## 2023-05-09 RX ADMIN — CHLORHEXIDINE GLUCONATE 0.12% ORAL RINSE 15 ML: 1.2 LIQUID ORAL at 08:53

## 2023-05-09 RX ADMIN — NYSTATIN: 100000 POWDER TOPICAL at 08:54

## 2023-05-09 RX ADMIN — HEPARIN SODIUM 5000 UNITS: 5000 INJECTION INTRAVENOUS; SUBCUTANEOUS at 05:19

## 2023-05-09 RX ADMIN — MUPIROCIN 1 APPLICATION: 20 OINTMENT TOPICAL at 08:54

## 2023-05-09 NOTE — PLAN OF CARE
Goal Outcome Evaluation:   Pt remained in the CICU overnight. On room air. Norvasc restarted. 1450 mL urine output; 700 mL BM. Pt nods appropriately to questions, does not answer questions verbally. Tube feeding residual 200 mL.

## 2023-05-09 NOTE — PROGRESS NOTES
"  PROGRESS NOTE  Patient Name: Shama Cruz  Age/Sex: 68 y.o. female  : 1955  MRN: [id number]    Date of Admission: 2023  Date of Encounter Visit: 23   LOS: 5 days   Patient Care Team:  Archie Murray MD as PCP - General (Pulmonary Disease)    Chief Complaint: Respiratory failure    Hospital course: Patient Liberated from the ventilator on 2023 and is doing fine, currently on room air.  She is on Levaquin and vancomycin based on her culture results for antibiotics  She is more tachycardic and hypertensive now that she is off sedation  She is tolerating tube feeding  She is afebrile  She is able to clear her secretion with no problem with handling her on oral secretions today      REVIEW OF SYSTEMS:   CONSTITUTIONAL: no fever or chills  Otherwise limited system review.     Ventilator/Non-Invasive Ventilation Settings (From admission, onward)     Start     Ordered    23 1653  Ventilator - AC/VC; Other; 18; 30%; 5; 500  Continuous        Question Answer Comment   Vent Mode AC/VC    Rate Other    Rate 18    FiO2 30%    PEEP 5    Flow Rate 500        23 1652                  Vital Signs  Temp:  [98.2 °F (36.8 °C)-98.5 °F (36.9 °C)] 98.3 °F (36.8 °C)  Heart Rate:  [] 115  BP: (102-175)/(56-82) 163/76  SpO2:  [93 %-100 %] 97 %  on  Flow (L/min):  [1] 1 Device (Oxygen Therapy): room air    Intake/Output Summary (Last 24 hours) at 2023 1006  Last data filed at 2023 0830  Gross per 24 hour   Intake 3139.53 ml   Output 5650 ml   Net -2510.47 ml     Flowsheet Rows    Flowsheet Row First Filed Value   Admission Height 162.6 cm (64\") Documented at 2023 0454   Admission Weight 90.7 kg (200 lb) Documented at 2023 0454        Body mass index is 27.65 kg/m².      23  0300 23  0600 23  0546   Weight: 71.1 kg (156 lb 12 oz) 71.1 kg (156 lb 12 oz) 73.1 kg (161 lb 2.5 oz)       Physical Exam:  GEN:   o awake, responsive, confused, able to interact with " examiner  EYES:   Sclerae clear. No icterus. PERRL. Normal EOM  ENT:   External ears/nose normal, no oral lesions, no thrush, mucous membranes moist   NECK:  Supple, midline trachea, no JVD  LUNGS: Normal chest on inspection, very minimal rhonchi, no crackles or wheezes.  Breathing is nonlabored on room air  CV:  Regular rhythm and rate. Normal S1/S2. No murmurs, gallops, or rubs noted.  ABD:  Soft, nontender and nondistended. Normal bowel sounds. No guarding.  PEG tube site is clean with no discharge or erythema  EXT: Patient was not moving the right upper extremity as well same for the right lower extremity, she does follow commands on the left. No cyanosis. No redness.  Trace edema.   Skin: Dry, intact, no bleeding    Results Review:    Results From Last 14 Days   Lab Units 05/05/23  0610 05/04/23  2328 05/04/23  1655   LACTATE mmol/L 2.7* 2.7* 3.6*     Results from last 7 days   Lab Units 05/09/23  0703 05/08/23  0328 05/07/23  0530 05/06/23  2326 05/06/23  0322 05/05/23  0413 05/04/23  1226 05/04/23  0830 05/04/23  0416   SODIUM mmol/L 135* 138  --   --  137 142 150* 150* 149*   POTASSIUM mmol/L 3.0* 3.9  --  4.6 2.9* 4.1 3.0* 3.5 5.0   CHLORIDE mmol/L 97* 105  --   --  107 112* 113* 115* 111*   CO2 mmol/L 23.0 24.0  --   --  19.6* 21.0* 23.7 24.0 24.6   BUN mg/dL 31* 26*  --   --  17 25* 31* 34* 42*   CREATININE mg/dL 0.52* 0.57 0.50*  --  0.38* 0.50* 0.54* 0.66 0.84   CALCIUM mg/dL 8.3* 8.8  --   --  6.9* 7.0* 7.9* 8.1* 9.0   AST (SGOT) U/L  --   --   --   --   --   --   --   --  36*   ALT (SGPT) U/L  --   --   --   --   --   --   --   --  14   ANION GAP mmol/L 15.0 9.0  --   --  10.4 9.0 13.3 11.0 13.4   ALBUMIN g/dL  --   --   --   --   --   --  3.1*  --  3.0*     Results from last 7 days   Lab Units 05/04/23  0830 05/04/23  0416   HSTROP T ng/L 29* 41*             Results from last 7 days   Lab Units 05/09/23  0703 05/08/23  0328 05/07/23  1138 05/06/23  0322 05/05/23  0358 05/04/23  0416   WBC 10*3/mm3  13.34* 8.35 10.98* 9.01 11.61* 10.42   HEMOGLOBIN g/dL 11.2* 9.1* 9.2* 8.5* 9.5* 11.0*   HEMATOCRIT % 32.7* 27.6* 26.7* 24.1* 28.4* 33.8*   PLATELETS 10*3/mm3 201 137* 113* 84* 85* 98*   MCV fL 97.0 100.4* 96.7 95.3 99.3* 100.3*   NEUTROPHIL % %  --   --  88.9* 78.1* 78.1* 78.4*   LYMPHOCYTE % %  --   --  5.8* 13.9* 14.1* 13.7*   MONOCYTES % %  --   --  3.8* 4.4* 5.4 7.0   EOSINOPHIL % %  --   --  0.0* 2.8 1.6 0.0*   BASOPHIL % %  --   --  0.1 0.2 0.3 0.2         Results from last 7 days   Lab Units 05/05/23  0413   MAGNESIUM mg/dL 2.0           Invalid input(s): LDLCALC  Results from last 7 days   Lab Units 05/08/23  0831 05/07/23  0341 05/06/23  0308 05/05/23  1209 05/05/23  0804 05/05/23  0328 05/04/23  0304   PH, ARTERIAL pH units 7.444 7.445 7.465* 7.416 7.407 7.400 7.373   PCO2, ARTERIAL mm Hg 37.2 33.6* 31.5* 33.0* 35.1 32.8* 51.0*   PO2 ART mm Hg 78.3* 81.2 83.8 101.7* 93.8 92.8 463.2*   HCO3 ART mmol/L 25.5 23.1 22.7 21.3* 22.1 20.3* 29.7*         Glucose   Date/Time Value Ref Range Status   05/09/2023 0518 305 (H) 70 - 130 mg/dL Final     Comment:     Meter: [id number] : [id number] Aayush Ferrer RN   05/08/2023 2306 291 (H) 70 - 130 mg/dL Final     Comment:     Meter: [id number] : [id number] Isaac Cardoso    05/08/2023 1751 214 (H) 70 - 130 mg/dL Final     Comment:     Meter: [id number] : [id number] Saray Weaver    05/08/2023 1120 259 (H) 70 - 130 mg/dL Final     Comment:     Meter: [id number] : [id number] Saray Weaver    05/08/2023 0533 286 (H) 70 - 130 mg/dL Final     Comment:     Meter: [id number] : [id number] Levy STRONG   05/07/2023 2348 338 (H) 70 - 130 mg/dL Final     Comment:     Meter: [id number] : [id number] Levy STRONG   05/07/2023 1722 314 (H) 70 - 130 mg/dL Final     Comment:     Meter: [id number] : [id number] Nguyễn Lucas RN   05/07/2023 1156 328 (H) 70 - 130 mg/dL Final     Comment:     Meter: [id number] : [id number] Nguyễn Lucas RN      Results from last 7 days   Lab Units 05/05/23  0610 05/04/23  2328 05/04/23  1655 05/04/23  1226 05/04/23  0830 05/04/23  0416   PROCALCITONIN ng/mL  --   --   --   --   --  0.33*   LACTATE mmol/L 2.7* 2.7* 3.6* 3.7* 3.6* 5.1*     Results from last 7 days   Lab Units 05/07/23  1151 05/06/23  0940 05/04/23  0440 05/04/23  0428 05/04/23  0423   BLOODCX   --   --  No growth at 5 days  --  No growth at 5 days   RESPCX  No growth No growth at 2 days  --   --   --    URINECX   --   --   --  >100,000 CFU/mL Klebsiella aerogenes*  --      Results from last 7 days   Lab Units 05/04/23  0428   NITRITE UA  Negative   WBC UA /HPF 6-12*   BACTERIA UA /HPF 2+*   SQUAM EPITHEL UA /HPF 0-2   URINECX  >100,000 CFU/mL Klebsiella aerogenes*                   Imaging:   Imaging Results (All)     Procedure Component Value Units Date/Time       I reviewed the patient's new clinical results.  I personally viewed and interpreted the patient's imaging results:        Medication Review:   acetaminophen, 1,000 mg, Oral, Once  amLODIPine, 10 mg, Per G Tube, Daily  castor oil-balsam peru, 1 application, Topical, Q12H  castor oil-balsam peru, 1 application, Topical, Q12H  chlorhexidine, 15 mL, Mouth/Throat, Q12H  famotidine, 20 mg, Oral, BID  heparin (porcine), 5,000 Units, Subcutaneous, Q8H  insulin glargine, 30 Units, Subcutaneous, Daily  insulin regular, 4-24 Units, Subcutaneous, Q6H  levoFLOXacin, 750 mg, Per G Tube, Q24H  Menthol-Zinc Oxide, 1 application, Topical, BID  mupirocin, 1 application, Topical, Q12H  nystatin, , Topical, Q12H  predniSONE, 10 mg, Oral, Daily  sodium chloride, 30 mL/kg, Intravenous, Once  sodium chloride, 500 mL, Intravenous, Once  sodium chloride, 10 mL, Intravenous, Q12H  vancomycin, 750 mg, Intravenous, Q12H        dexmedetomidine, 0.2-1.5 mcg/kg/hr, Last Rate: Stopped (05/08/23 0910)  norepinephrine, 0.02-0.3 mcg/kg/min  Pharmacy to dose vancomycin,         ASSESSMENT:   1. Acute hypoxemic respiratory  failure, failed extubation and was reintubated on 5/5/2023, liberated again on 5/8/2023  2. Sepsis  3. Pneumonia, on Zosyn/VANC, positive MRSA screen  4. UTI -updated culture results is now showing Klebsiella aerogenes, Resistant to Zosyn but susceptible to cefepime, sensitive to quinolones and to aminoglycosides and to Bactrim  5. MRSA swab positive however patient has no infiltrate  6. Lactic acidosis   7. Hypokalemia   8. Mild hypernatremia  9. Diabetes with hyperglycemia, worsened by steroids  10. Dementia  11. Management of mechanical ventilation.  12. Thrombocytopenia, platelet counts in the 80s and has been stable  13. Status post PEG  14. 3 cm indeterminate right adrenal nodule. - outpt follow up  15. History of prior stroke  16. Hypertension with tachycardia, noted most postextubation    PLAN:  Patient did well postextubation, currently on room air, more calm and more responsive.  She is still pretty confused and was able to follow basic simple commands, close to her baseline.  She is tolerating tube feeding  She is tachycardic, blood pressure is on the higher end  Glycemic control is still showing elevated numbers, her steroids have been weaned down to 10 mg, she is on her Levaquin for her Klebsiella which was resistant to Zosyn and she is on the vancomycin for her MRSA.  Remains afebrile on the above regimen, and the white count is fluctuating and is slightly more elevated today  She did respond very well to the IV Lasix, we will start her on low-dose daily oral  We will transfer to a stepdown unit    Is on the proper modified antibiotic regimen given the sensitivity on her Klebsiella cultures, and she is also on the vancomycin for the positive MRSA screen.  Respiratory cultures were done after she was on the  vancomycin for 2 days and are not reliable to rule out MRSA infection so we decided to go with the nasal swab PCR screen.  Steroid dose were reduced but she still having significant hyperglycemia,  she is tolerating tube feeding  Her fluid balance has been positive for 6 L retention over the last several days and with the edema on the bronchoscopy, patient may benefit from diuretics  She is afebrile with normalization of the white blood cell count    Summary of recommendations:  ·  finish the course of Levaquin and vancomycin as ordered  · Start p.o. metoprolol  · Start p.o. Lasix  · Transfer to stepdown unit  · PT/OT  · Feeding as tolerated  · Further adjustment on the Lantus dose  · Continue with the current dose of 10 mg of prednisone  · Potassium replacement per protocol and scheduled potassium while on the Lasix      Disposition: Continue to monitor in the CICU, keep on the ventilator    Bryan Cabrera MD  05/09/23  10:06 EDT        Dictated utilizing Dragon dictation

## 2023-05-10 LAB
ANION GAP SERPL CALCULATED.3IONS-SCNC: 14.7 MMOL/L (ref 5–15)
BUN SERPL-MCNC: 32 MG/DL (ref 8–23)
BUN/CREAT SERPL: 61.5 (ref 7–25)
CALCIUM SPEC-SCNC: 9.3 MG/DL (ref 8.6–10.5)
CHLORIDE SERPL-SCNC: 97 MMOL/L (ref 98–107)
CO2 SERPL-SCNC: 21.3 MMOL/L (ref 22–29)
CREAT SERPL-MCNC: 0.52 MG/DL (ref 0.57–1)
DEPRECATED RDW RBC AUTO: 50.9 FL (ref 37–54)
EGFRCR SERPLBLD CKD-EPI 2021: 102 ML/MIN/1.73
ERYTHROCYTE [DISTWIDTH] IN BLOOD BY AUTOMATED COUNT: 14.6 % (ref 12.3–15.4)
GLUCOSE BLDC GLUCOMTR-MCNC: 183 MG/DL (ref 70–130)
GLUCOSE BLDC GLUCOMTR-MCNC: 242 MG/DL (ref 70–130)
GLUCOSE BLDC GLUCOMTR-MCNC: 274 MG/DL (ref 70–130)
GLUCOSE SERPL-MCNC: 183 MG/DL (ref 65–99)
HCT VFR BLD AUTO: 34.9 % (ref 34–46.6)
HGB BLD-MCNC: 12.2 G/DL (ref 12–15.9)
MCH RBC QN AUTO: 33.5 PG (ref 26.6–33)
MCHC RBC AUTO-ENTMCNC: 35 G/DL (ref 31.5–35.7)
MCV RBC AUTO: 95.9 FL (ref 79–97)
PLATELET # BLD AUTO: 222 10*3/MM3 (ref 140–450)
PMV BLD AUTO: 12.6 FL (ref 6–12)
POTASSIUM SERPL-SCNC: 4.2 MMOL/L (ref 3.5–5.2)
RBC # BLD AUTO: 3.64 10*6/MM3 (ref 3.77–5.28)
SODIUM SERPL-SCNC: 133 MMOL/L (ref 136–145)
WBC NRBC COR # BLD: 11.81 10*3/MM3 (ref 3.4–10.8)

## 2023-05-10 PROCEDURE — 63710000001 INSULIN REGULAR HUMAN PER 5 UNITS: Performed by: INTERNAL MEDICINE

## 2023-05-10 PROCEDURE — 25010000002 VANCOMYCIN 750 MG RECONSTITUTED SOLUTION 1 EACH VIAL: Performed by: INTERNAL MEDICINE

## 2023-05-10 PROCEDURE — 82948 REAGENT STRIP/BLOOD GLUCOSE: CPT

## 2023-05-10 PROCEDURE — 63710000001 PREDNISONE PER 5 MG: Performed by: INTERNAL MEDICINE

## 2023-05-10 PROCEDURE — 97162 PT EVAL MOD COMPLEX 30 MIN: CPT

## 2023-05-10 PROCEDURE — 25010000002 HEPARIN (PORCINE) PER 1000 UNITS: Performed by: INTERNAL MEDICINE

## 2023-05-10 PROCEDURE — 80048 BASIC METABOLIC PNL TOTAL CA: CPT | Performed by: INTERNAL MEDICINE

## 2023-05-10 PROCEDURE — 85027 COMPLETE CBC AUTOMATED: CPT | Performed by: INTERNAL MEDICINE

## 2023-05-10 RX ORDER — SCOLOPAMINE TRANSDERMAL SYSTEM 1 MG/1
1 PATCH, EXTENDED RELEASE TRANSDERMAL
Status: DISCONTINUED | OUTPATIENT
Start: 2023-05-10 | End: 2023-05-17 | Stop reason: HOSPADM

## 2023-05-10 RX ADMIN — LOPERAMIDE HCL 2 MG: 1 SOLUTION ORAL at 23:46

## 2023-05-10 RX ADMIN — INSULIN HUMAN 12 UNITS: 100 INJECTION, SOLUTION PARENTERAL at 11:44

## 2023-05-10 RX ADMIN — AMLODIPINE BESYLATE 10 MG: 10 TABLET ORAL at 08:38

## 2023-05-10 RX ADMIN — ANORECTAL OINTMENT 1 APPLICATION: 15.7; .44; 24; 20.6 OINTMENT TOPICAL at 08:37

## 2023-05-10 RX ADMIN — FAMOTIDINE 20 MG: 20 TABLET ORAL at 21:30

## 2023-05-10 RX ADMIN — NYSTATIN: 100000 POWDER TOPICAL at 08:39

## 2023-05-10 RX ADMIN — CASTOR OIL AND BALSAM, PERU 1 APPLICATION: 788; 87 OINTMENT TOPICAL at 21:30

## 2023-05-10 RX ADMIN — LOPERAMIDE HCL 2 MG: 1 SOLUTION ORAL at 15:26

## 2023-05-10 RX ADMIN — NYSTATIN: 100000 POWDER TOPICAL at 21:31

## 2023-05-10 RX ADMIN — CHLORHEXIDINE GLUCONATE 0.12% ORAL RINSE 15 ML: 1.2 LIQUID ORAL at 08:38

## 2023-05-10 RX ADMIN — METOPROLOL TARTRATE 75 MG: 25 TABLET, FILM COATED ORAL at 21:30

## 2023-05-10 RX ADMIN — MUPIROCIN 1 APPLICATION: 20 OINTMENT TOPICAL at 21:31

## 2023-05-10 RX ADMIN — INSULIN HUMAN 8 UNITS: 100 INJECTION, SOLUTION PARENTERAL at 06:59

## 2023-05-10 RX ADMIN — Medication 10 ML: at 08:39

## 2023-05-10 RX ADMIN — CASTOR OIL AND BALSAM, PERU 1 APPLICATION: 788; 87 OINTMENT TOPICAL at 08:37

## 2023-05-10 RX ADMIN — INSULIN HUMAN 4 UNITS: 100 INJECTION, SOLUTION PARENTERAL at 17:38

## 2023-05-10 RX ADMIN — ANORECTAL OINTMENT 1 APPLICATION: 15.7; .44; 24; 20.6 OINTMENT TOPICAL at 21:31

## 2023-05-10 RX ADMIN — VANCOMYCIN HYDROCHLORIDE 750 MG: 750 INJECTION, POWDER, LYOPHILIZED, FOR SOLUTION INTRAVENOUS at 06:57

## 2023-05-10 RX ADMIN — INSULIN HUMAN 8 UNITS: 100 INJECTION, SOLUTION PARENTERAL at 00:34

## 2023-05-10 RX ADMIN — HEPARIN SODIUM 5000 UNITS: 5000 INJECTION INTRAVENOUS; SUBCUTANEOUS at 06:59

## 2023-05-10 RX ADMIN — CHLORHEXIDINE GLUCONATE 0.12% ORAL RINSE 15 ML: 1.2 LIQUID ORAL at 21:30

## 2023-05-10 RX ADMIN — HEPARIN SODIUM 5000 UNITS: 5000 INJECTION INTRAVENOUS; SUBCUTANEOUS at 15:26

## 2023-05-10 RX ADMIN — POTASSIUM CHLORIDE 20 MEQ: 1.5 POWDER, FOR SOLUTION ORAL at 08:38

## 2023-05-10 RX ADMIN — Medication 10 ML: at 21:31

## 2023-05-10 RX ADMIN — CASTOR OIL AND BALSAM, PERU 1 APPLICATION: 788; 87 OINTMENT TOPICAL at 21:29

## 2023-05-10 RX ADMIN — FUROSEMIDE 20 MG: 20 TABLET ORAL at 08:38

## 2023-05-10 RX ADMIN — HEPARIN SODIUM 5000 UNITS: 5000 INJECTION INTRAVENOUS; SUBCUTANEOUS at 21:30

## 2023-05-10 RX ADMIN — LEVOFLOXACIN 750 MG: 750 TABLET, FILM COATED ORAL at 11:44

## 2023-05-10 RX ADMIN — INSULIN GLARGINE-YFGN 40 UNITS: 100 INJECTION, SOLUTION SUBCUTANEOUS at 08:38

## 2023-05-10 RX ADMIN — PREDNISONE 10 MG: 10 TABLET ORAL at 08:38

## 2023-05-10 RX ADMIN — MUPIROCIN 1 APPLICATION: 20 OINTMENT TOPICAL at 08:37

## 2023-05-10 RX ADMIN — FAMOTIDINE 20 MG: 20 TABLET ORAL at 08:38

## 2023-05-10 RX ADMIN — VANCOMYCIN HYDROCHLORIDE 750 MG: 750 INJECTION, POWDER, LYOPHILIZED, FOR SOLUTION INTRAVENOUS at 17:16

## 2023-05-10 RX ADMIN — CASTOR OIL AND BALSAM, PERU 1 APPLICATION: 788; 87 OINTMENT TOPICAL at 08:38

## 2023-05-10 RX ADMIN — METOPROLOL TARTRATE 50 MG: 50 TABLET, FILM COATED ORAL at 08:38

## 2023-05-10 NOTE — PROGRESS NOTES
"  PROGRESS NOTE  Patient Name: Shama Cruz  Age/Sex: 68 y.o. female  : 1955  MRN: [id number]    Date of Admission: 2023  Date of Encounter Visit: 05/10/23   LOS: 6 days   Patient Care Team:  Archie Murray MD as PCP - General (Pulmonary Disease)    Chief Complaint: Respiratory failure    Hospital course: Patient Liberated from the ventilator on 2023 and is doing fine, currently on room air.  She is on Levaquin and vancomycin based on her culture results for antibiotics  She is more tachycardic and hypertensive now that she is off sedation  She is tolerating tube feeding  She is afebrile  She seems to start building up some secretion in the posterior oropharynx that I can hear on exam but still in no distress  She does not have suction set up at the bedside  Patient has rectal tube in position with ongoing diarrhea, the plan was to use the antidiarrhea medication and remove the rectal tube.      REVIEW OF SYSTEMS:   CONSTITUTIONAL: no fever or chills  Otherwise limited system review.     Ventilator/Non-Invasive Ventilation Settings (From admission, onward)         Vital Signs  Temp:  [98 °F (36.7 °C)-99.2 °F (37.3 °C)] 99.2 °F (37.3 °C)  Heart Rate:  [] 108  Resp:  [16-22] 16  BP: (132-157)/(65-88) 132/88  SpO2:  [93 %-100 %] 94 %  on    Device (Oxygen Therapy): room air    Intake/Output Summary (Last 24 hours) at 5/10/2023 1021  Last data filed at 5/10/2023 0845  Gross per 24 hour   Intake 914 ml   Output 2900 ml   Net -1986 ml     Flowsheet Rows    Flowsheet Row First Filed Value   Admission Height 162.6 cm (64\") Documented at 2023 0454   Admission Weight 90.7 kg (200 lb) Documented at 2023 0454        Body mass index is 26.7 kg/m².      23  0600 23  0546 05/10/23  0500   Weight: 71.1 kg (156 lb 12 oz) 73.1 kg (161 lb 2.5 oz) 70.6 kg (155 lb 10.3 oz)       Physical Exam:  GEN:   awake, responsive, confused, able to interact with examiner, patient is " nonverbal  EYES:   Sclerae clear. No icterus. PERRL. Normal EOM  ENT:   External ears/nose normal, no oral lesions, no thrush, mucous membranes moist   NECK:  Supple, midline trachea, no JVD  LUNGS: Normal chest on inspection, slightly more prominent rhonchi from the posterior oropharynx, there is no wheezing, the breathing is nonlabored, she remains on room air  CV:  Regular rhythm and rate. Normal S1/S2. No murmurs, gallops, or rubs noted.  ABD:  Soft, nontender and nondistended. Normal bowel sounds. No guarding.  PEG tube site is clean with no discharge or erythema  EXT: Patient was not moving the right upper extremity as well same for the right lower extremity, she does follow commands on the left. No cyanosis. No redness.  Resolved edema.   Skin: Dry, intact, no bleeding    Results Review:    Results From Last 14 Days   Lab Units 05/05/23  0610 05/04/23  2328 05/04/23  1655   LACTATE mmol/L 2.7* 2.7* 3.6*     Results from last 7 days   Lab Units 05/10/23  0352 05/09/23  2045 05/09/23  0703 05/08/23  0328 05/07/23  0530 05/06/23  2326 05/06/23  0322 05/05/23  0413 05/04/23  1226 05/04/23  0830 05/04/23  0416   SODIUM mmol/L 133*  --  135* 138  --   --  137 142 150* 150* 149*   POTASSIUM mmol/L 4.2 4.6 3.0* 3.9  --  4.6 2.9* 4.1 3.0* 3.5 5.0   CHLORIDE mmol/L 97*  --  97* 105  --   --  107 112* 113* 115* 111*   CO2 mmol/L 21.3*  --  23.0 24.0  --   --  19.6* 21.0* 23.7 24.0 24.6   BUN mg/dL 32*  --  31* 26*  --   --  17 25* 31* 34* 42*   CREATININE mg/dL 0.52*  --  0.52* 0.57 0.50*  --  0.38* 0.50* 0.54* 0.66 0.84   CALCIUM mg/dL 9.3  --  8.3* 8.8  --   --  6.9* 7.0* 7.9* 8.1* 9.0   AST (SGOT) U/L  --   --   --   --   --   --   --   --   --   --  36*   ALT (SGPT) U/L  --   --   --   --   --   --   --   --   --   --  14   ANION GAP mmol/L 14.7  --  15.0 9.0  --   --  10.4 9.0 13.3 11.0 13.4   ALBUMIN g/dL  --   --   --   --   --   --   --   --  3.1*  --  3.0*     Results from last 7 days   Lab Units 05/04/23  0834  05/04/23  0416   HSTROP T ng/L 29* 41*             Results from last 7 days   Lab Units 05/10/23  0352 05/09/23  0703 05/08/23  0328 05/07/23  1138 05/06/23  0322 05/05/23  0358 05/04/23 0416   WBC 10*3/mm3 11.81* 13.34* 8.35 10.98* 9.01 11.61* 10.42   HEMOGLOBIN g/dL 12.2 11.2* 9.1* 9.2* 8.5* 9.5* 11.0*   HEMATOCRIT % 34.9 32.7* 27.6* 26.7* 24.1* 28.4* 33.8*   PLATELETS 10*3/mm3 222 201 137* 113* 84* 85* 98*   MCV fL 95.9 97.0 100.4* 96.7 95.3 99.3* 100.3*   NEUTROPHIL % %  --   --   --  88.9* 78.1* 78.1* 78.4*   LYMPHOCYTE % %  --   --   --  5.8* 13.9* 14.1* 13.7*   MONOCYTES % %  --   --   --  3.8* 4.4* 5.4 7.0   EOSINOPHIL % %  --   --   --  0.0* 2.8 1.6 0.0*   BASOPHIL % %  --   --   --  0.1 0.2 0.3 0.2         Results from last 7 days   Lab Units 05/09/23 0703 05/05/23 0413   MAGNESIUM mg/dL 2.0 2.0           Invalid input(s): LDLCALC  Results from last 7 days   Lab Units 05/08/23  0831 05/07/23  0341 05/06/23  0308 05/05/23  1209 05/05/23  0804 05/05/23  0328 05/04/23  0304   PH, ARTERIAL pH units 7.444 7.445 7.465* 7.416 7.407 7.400 7.373   PCO2, ARTERIAL mm Hg 37.2 33.6* 31.5* 33.0* 35.1 32.8* 51.0*   PO2 ART mm Hg 78.3* 81.2 83.8 101.7* 93.8 92.8 463.2*   HCO3 ART mmol/L 25.5 23.1 22.7 21.3* 22.1 20.3* 29.7*         Glucose   Date/Time Value Ref Range Status   05/10/2023 0629 242 (H) 70 - 130 mg/dL Final     Comment:     Meter: [id number] : [id number] Patience Blackmon NA   05/09/2023 2356 231 (H) 70 - 130 mg/dL Final     Comment:     M[facility]: [id number] : [id number] [facility]   05/09/2023 1801 188 (H) 70 - 130 mg/dL Final     Comment:     Meter: [id number] : [id number] Ubaldo Ott NA   05/09/2023 1201 285 (H) 70 - 130 mg/dL Final     Comment:     Meter: [id number] : [id number] Harmony Houston NA   05/09/2023 0518 305 (H) 70 - 130 mg/dL Final     Comment:     Meter: [id number] : [id number] Aayush Ferrer RN   05/08/2023 2306 291 (H) 70 - 130 mg/dL Final     Comment:     Meter:  [id number] : [id number] Isaac STRONG   05/08/2023 1751 214 (H) 70 - 130 mg/dL Final     Comment:     Meter: [id number] : [id number] Saray STRONG   05/08/2023 1120 259 (H) 70 - 130 mg/dL Final     Comment:     Meter: [id number] : [id number] Saray STRONG     Results from last 7 days   Lab Units 05/05/23  0610 05/04/23  2328 05/04/23  1655 05/04/23  1226 05/04/23  0830 05/04/23  0416   PROCALCITONIN ng/mL  --   --   --   --   --  0.33*   LACTATE mmol/L 2.7* 2.7* 3.6* 3.7* 3.6* 5.1*     Results from last 7 days   Lab Units 05/07/23  1151 05/06/23  0940 05/04/23  0440 05/04/23  0428 05/04/23  0423   BLOODCX   --   --  No growth at 5 days  --  No growth at 5 days   RESPCX  No growth No growth at 2 days  --   --   --    URINECX   --   --   --  >100,000 CFU/mL Klebsiella aerogenes*  --      Results from last 7 days   Lab Units 05/04/23  0428   NITRITE UA  Negative   WBC UA /HPF 6-12*   BACTERIA UA /HPF 2+*   SQUAM EPITHEL UA /HPF 0-2   URINECX  >100,000 CFU/mL Klebsiella aerogenes*                   Imaging:   Imaging Results (All)     Procedure Component Value Units Date/Time       I reviewed the patient's new clinical results.  I personally viewed and interpreted the patient's imaging results:        Medication Review:   acetaminophen, 1,000 mg, Oral, Once  amLODIPine, 10 mg, Per G Tube, Daily  castor oil-balsam peru, 1 application, Topical, Q12H  castor oil-balsam peru, 1 application, Topical, Q12H  chlorhexidine, 15 mL, Mouth/Throat, Q12H  famotidine, 20 mg, Oral, BID  furosemide, 20 mg, Oral, Daily  heparin (porcine), 5,000 Units, Subcutaneous, Q8H  insulin glargine, 40 Units, Subcutaneous, Daily  insulin regular, 4-24 Units, Subcutaneous, Q6H  levoFLOXacin, 750 mg, Per G Tube, Q24H  Menthol-Zinc Oxide, 1 application, Topical, BID  metoprolol tartrate, 50 mg, Oral, Q12H  mupirocin, 1 application, Topical, Q12H  nystatin, , Topical, Q12H  potassium chloride, 20 mEq, Oral, Daily  predniSONE, 10  mg, Oral, Daily  sodium chloride, 30 mL/kg, Intravenous, Once  sodium chloride, 500 mL, Intravenous, Once  sodium chloride, 10 mL, Intravenous, Q12H  vancomycin, 750 mg, Intravenous, Q12H        Pharmacy to dose vancomycin,         ASSESSMENT:   1. Acute hypoxemic respiratory failure, failed extubation and was reintubated on 5/5/2023, liberated again on 5/8/2023  2. Sepsis  3. Pneumonia, on Zosyn/VANC, positive MRSA screen  4. UTI -updated culture results is now showing Klebsiella aerogenes, Resistant to Zosyn but susceptible to cefepime, sensitive to quinolones and to aminoglycosides and to Bactrim  5. MRSA swab positive however patient has no infiltrate  6. Lactic acidosis   7. Hypokalemia   8. Mild hypernatremia  9. Diabetes with hyperglycemia, worsened by steroids  10. Dementia  11. Management of mechanical ventilation.  12. Thrombocytopenia, platelet counts in the 80s and has been stable  13. Status post PEG  14. 3 cm indeterminate right adrenal nodule. - outpt follow up  15. History of prior stroke  16. Hypertension with tachycardia, noted most postextubation    PLAN:  Patient seems to be having some secretion reaccumulation in the posterior oropharynx, we will instruct staff to have suction set up at the bedside to help with that  Tachycardia is better but cannot tolerate further adjustment on the metoprolol dose  Doing very well on the Lasix and diuresing  Continue with the Lasix at the current dose of 20 mg with potassium supplement  Blood sugar still out of range and expect that to improve further with further adjustment on the Lantus and after discontinuing the steroids  Patient should be done with her vancomycin course soon and she also has few more days on her Levaquin dose  If patient goes back into another pneumonia spell, she will need to have tracheostomy to help handle her respiratory secretions specially if she remains full code  She is on free water at 50 cc every 2 hours and her sodium is  continuing to trend down in the abnormal range and that needs to be reduced  She remains afebrile, on room air however we need to help minimize her oral secretion, we will add some anticholinergic    Summary of recommendations:  · Adjust insulin regimen to correct the hyperglycemia  · Continue with the daily CBC  · Adjust the free water with the tube feeding given the progressive decline in sodium level  · Continue with the aspiration precaution and the respiratory toilet  · Finish the course of Levaquin and vancomycin as ordered  · Continue metoprolol and increase the dose  · PT/OT to continue to follow  · Discontinue prednisone  · Remove the fecal management system and use the Imodium for the diarrhea as ordered      Disposition: Back to nursing facility once medically stable    Bryan Cabrera MD  05/10/23  10:21 EDT        Dictated utilizing Dragon dictation

## 2023-05-10 NOTE — PLAN OF CARE
Problem: Adult Inpatient Plan of Care  Goal: Plan of Care Review  Outcome: Ongoing, Progressing  Flowsheets  Taken 5/10/2023 0355 by María Stein, RN  Plan of Care Reviewed With: patient  Outcome Evaluation: pt confused, only moans at times but doesn't appear to be in pain.  no distress noted.  Taken 5/8/2023 1740 by Meg So  Progress: no change   Goal Outcome Evaluation:  Plan of Care Reviewed With: patient           Outcome Evaluation: pt confused, only moans at times but doesn't appear to be in pain.  no distress noted.

## 2023-05-10 NOTE — PLAN OF CARE
Problem: Adult Inpatient Plan of Care  Goal: Plan of Care Review  Outcome: Ongoing, Progressing  Flowsheets (Taken 5/10/2023 1442)  Progress: no change  Plan of Care Reviewed With: patient   Goal Outcome Evaluation:  Plan of Care Reviewed With: patient        Progress: no change          Pt has been resting in bed today with Q2 hour turns. Pt no longer has a dukes catheter or fecal management system. Peg tube getting tube feeds at goal, tolerating this fine. Imodium given to help with diarrhea. Still on IV antibiotics. VSS. Anticipate discharge back to Nursing home in the next coming days.

## 2023-05-10 NOTE — PLAN OF CARE
Goal Outcome Evaluation:      Pt admit from LTC facility with sepsis due to PNA, UTI and acute hypoxic respiratory failure. Pt extubated 5/8/10. Pt prior CVA with apparent right deficits, dementia, and DM. Pt currently on RA. She opened her eyes to her name  repeatedly but did not follow command for BUE or BLE today. No active movement noted x4 extremities today. PROM performed x4 extremities. RUE appears flaccid with limited shoulder rom and limited finger rom( fingers straight but several limited in flex). RLE knee appears limited extention end rom  and lana ankles < neutral DF right worse than left. PT moaned during RLE prom due to stiffness Pt prior functional ability not know but it is likely that pt was uic daily with possible stand pivot vs lift. Recommend Nsg cont rom as well as PT to avoid continued loss of rom and increased contractures. REcommend PT trial with asst of 2 for attempt at sitting to see if pt alertness improves. Pt placed on 5 x per week for trial but may not be appropriate if not able to participate.

## 2023-05-10 NOTE — THERAPY EVALUATION
Patient Name: Shama Cruz  : 1955    MRN: [id number]                              Today's Date: 5/10/2023       Admit Date: 2023    Visit Dx:     ICD-10-CM ICD-9-CM   1. Sepsis, due to unspecified organism, unspecified whether acute organ dysfunction present  A41.9 038.9     995.91   2. Acute UTI  N39.0 599.0   3. Altered mental status, unspecified altered mental status type  R41.82 780.97   4. Hyperglycemia  R73.9 790.29   5. Hyponatremia  E87.1 276.1   6. Anemia, unspecified type  D64.9 285.9   7. Elevated troponin  R77.8 790.6     Patient Active Problem List   Diagnosis   • Sepsis, due to unspecified organism, unspecified whether acute organ dysfunction present     Past Medical History:   Diagnosis Date   • Cerebral infarction    • Chronic respiratory failure    • Dementia    • Depression    • Diabetes mellitus    • Hypertension      History reviewed. No pertinent surgical history.   General Information     Row Name 05/10/23 0756          Physical Therapy Time and Intention    Document Type evaluation  -SV     Mode of Treatment individual therapy;physical therapy  -SV     Row Name 05/10/23 0756          General Information    Patient Profile Reviewed yes  -SV     Prior Level of Function --  undetermined, possible uic or pivot to chair with asst of 1  -SV     Existing Precautions/Restrictions fall;oxygen therapy device and L/min  -SV     Barriers to Rehab medically complex;previous functional deficit;cognitive status;contractures  -SV     Row Name 05/10/23 0756          Living Environment    People in Home facility resident  -SV     Row Name 05/10/23 0756          Home Main Entrance    Number of Stairs, Main Entrance none  -SV     Row Name 05/10/23 0756          Stairs Within Home, Primary    Number of Stairs, Within Home, Primary none  -SV     Row Name 05/10/23 0756          Cognition    Orientation Status (Cognition) unable/difficult to assess  -SV     Row Name 05/10/23 0756          Safety  Issues, Functional Mobility    Impairments Affecting Function (Mobility) cognition;endurance/activity tolerance;grasp;motor control;muscle tone abnormal;strength;shortness of breath;range of motion (ROM)  -     Cognitive Impairments, Mobility Safety/Performance attention  -           User Key  (r) = Recorded By, (t) = Taken By, (c) = Cosigned By    Initials Name Provider Type    SV Janeth Lynn, PT Physical Therapist               Mobility     Row Name 05/10/23 1228          Bed Mobility    Bed Mobility bed mobility (all) activities  -     All Activities, [place] (Bed Mobility) dependent (less than 25% patient effort)  -     Comment, (Bed Mobility) Pt unable to asst , does not follow commands  -           User Key  (r) = Recorded By, (t) = Taken By, (c) = Cosigned By    Initials Name Provider Type    SV Janeth yLnn, PT Physical Therapist               Obj/Interventions     Row Name 05/10/23 1228          Range of Motion Comprehensive    General Range of Motion upper extremity range of motion deficits identified;lower extremity range of motion deficits identified  -     Comment, General Range of Motion RUE flaccid( prom to 25% flex and abd tested), elbow wfl, wrist and hand in neural , fingers straight with several difficulty flexing, right knee approx -30 extension, lana DF < neutral right worse than left, pt moaning during attempt to ext right knee  -     Row Name 05/10/23 1228          Strength Comprehensive (MMT)    Comment, General Manual Muscle Testing (MMT) Assessment no active movement noted today, Pt open eyes to command during rom but did not move extremities,  -     Row Name 05/10/23 1228          Motor Skills    Therapeutic Exercise --  PROM as tolerated most planes: shoulder flex, abd, elbow flex/ext, hip/knee flex, knee ext, df/pf and circles , moaning noted at times  -     Row Name 05/10/23 1228          Sensory Assessment (Somatosensory)    Sensory Assessment  (Somatosensory) not tested  -SV           User Key  (r) = Recorded By, (t) = Taken By, (c) = Cosigned By    Initials Name Provider Type    Janeth Calvillo, PT Physical Therapist               Goals/Plan     Row Name 05/10/23 1235          Bed Mobility Goal 1 (PT)    Activity/Assistive Device (Bed Mobility Goal 1, PT) sit to supine/supine to sit  -SV     [place] Level/Cues Needed (Bed Mobility Goal 1, PT) moderate assist (50-74% patient effort);maximum assist (25-49% patient effort)  -SV     Time Frame (Bed Mobility Goal 1, PT) 10 days  -SV     Row Name 05/10/23 1235          ROM Goal 1 (PT)    ROM Goal 1 (PT) right knee -10 degrees ext, and Df lana neutral  -SV     Time Frame (ROM Goal 1, PT) 10 days  -SV     Row Name 05/10/23 1235          Patient Education Goal (PT)    Activity (Patient Education Goal, PT) sitting balance with min asst  -SV     Time Frame (Patient Education Goal, PT) 10 days  -SV     Row Name 05/10/23 1235          Therapy Assessment/Plan (PT)    Planned Therapy Interventions (PT) balance training;bed mobility training;home exercise program;stretching;ROM (range of motion);transfer training  -SV           User Key  (r) = Recorded By, (t) = Taken By, (c) = Cosigned By    Initials Name Provider Type    SV Janeth Lynn, PT Physical Therapist               Clinical Impression     Row Name 05/10/23 1232          Pain    Pre/Posttreatment Pain Comment moaning during rom  -SV     Row Name 05/10/23 1232          Therapy Assessment/Plan (PT)    Criteria for Skilled Interventions Met (PT) other (see comments)  Pt right knee and lana ankles impaired rom  -SV     Therapy Frequency (PT) 5 times/wk  -SV     Predicted Duration of Therapy Intervention (PT) TBD  -SV     Row Name 05/10/23 1232          Vital Signs    Pre Patient Position Supine  -SV     Intra Patient Position Supine  -SV     Post Patient Position Supine  -SV     Recovery Time no change in vital signs during session  -SV     Row Name  05/10/23 1232          Positioning and Restraints    Pre-Treatment Position in bed  -SV     Post Treatment Position bed  -SV     In Bed notified nsg;call light within reach;encouraged to call for assist;exit alarm on  -SV           User Key  (r) = Recorded By, (t) = Taken By, (c) = Cosigned By    Initials Name Provider Type    SV Janeth Lynn, PT Physical Therapist               Outcome Measures     Row Name 05/10/23 1237          How much help from another person do you currently need...    Turning from your back to your side while in flat bed without using bedrails? 1  -SV     Moving from lying on back to sitting on the side of a flat bed without bedrails? 1  -SV     Moving to and from a bed to a chair (including a wheelchair)? 1  -SV     Standing up from a chair using your arms (e.g., wheelchair, bedside chair)? 1  -SV     Climbing 3-5 steps with a railing? 1  -SV     To walk in hospital room? 1  -SV     AM-PAC 6 Clicks Score (PT) 6  -SV     Highest level of mobility 2 --> Bed activities/dependent transfer  -SV     Row Name 05/10/23 1237          Functional Assessment    Outcome Measure Options AM-PAC 6 Clicks Basic Mobility (PT)  -SV           User Key  (r) = Recorded By, (t) = Taken By, (c) = Cosigned By    Initials Name Provider Type    SV Janeth Lynn, PT Physical Therapist                             Physical Therapy Education     Title: PT OT SLP Therapies (Done)     Topic: Physical Therapy (Done)     Point: Mobility training (Done)     Learning Progress Summary           Patient Acceptance, E, VU,NR by  at 5/10/2023 1237                   Point: Home exercise program (Done)     Learning Progress Summary           Patient Acceptance, E, VU,NR by  at 5/10/2023 1237                               User Key     Initials Effective Dates Name Provider Type Discipline     06/16/21 -  Janeth Lynn, PT Physical Therapist PT              PT Recommendation and Plan  Planned Therapy Interventions (PT):  balance training, bed mobility training, home exercise program, stretching, ROM (range of motion), transfer training        Time Calculation:    PT Charges     Row Name 05/10/23 1246             Time Calculation    Start Time 0756  -SV      Stop Time 0808  -SV      Time Calculation (min) 12 min  -SV      PT Received On 05/10/23  -SV      PT - Next Appointment 05/11/23  -SV      PT Goal Re-Cert Due Date 05/20/23 -SV            User Key  (r) = Recorded By, (t) = Taken By, (c) = Cosigned By    Initials Name Provider Type     Janeth Lynn, PT Physical Therapist              Therapy Charges for Today     Code Description Service Date Service Provider Modifiers Qty    [id number] HC PT EVAL MOD COMPLEXITY 2 5/10/2023 Janeth Lynn, PT GP 1          PT G-Codes  Outcome Measure Options: AM-PAC 6 Clicks Basic Mobility (PT)  AM-PAC 6 Clicks Score (PT): 6  PT Discharge Summary  Anticipated Discharge Disposition (PT): extended care facility    Janeth Lynn PT  5/10/2023

## 2023-05-10 NOTE — CASE MANAGEMENT/SOCIAL WORK
Continued Stay Note  [facility]     Patient Name: Shama Cruz  MRN: [id number]  Today's Date: 5/10/2023    Admit Date: 5/4/2023    Plan: Return to LTC bed at [facility].  Facility pursuing guardianship   Discharge Plan     Row Name 05/10/23 1147       Plan    Plan Return to LTC bed at [facility].  Facility pursuing guardianship    Patient/Family in Agreement with Plan unable to assess    Plan Comments Plans remains for patient to return to [facility] LTC.  [facility] pursuing guardianship and hearing scheduled for June 7. Will need ambulance for transport.  MEGAN Gonzalez RN               Discharge Codes    No documentation.                     Sadia Gonzalez, RN

## 2023-05-11 LAB
GLUCOSE BLDC GLUCOMTR-MCNC: 166 MG/DL (ref 70–130)
GLUCOSE BLDC GLUCOMTR-MCNC: 174 MG/DL (ref 70–130)
GLUCOSE BLDC GLUCOMTR-MCNC: 177 MG/DL (ref 70–130)
GLUCOSE BLDC GLUCOMTR-MCNC: 200 MG/DL (ref 70–130)

## 2023-05-11 PROCEDURE — 94799 UNLISTED PULMONARY SVC/PX: CPT

## 2023-05-11 PROCEDURE — 25010000002 HEPARIN (PORCINE) PER 1000 UNITS: Performed by: INTERNAL MEDICINE

## 2023-05-11 PROCEDURE — 25010000002 METHYLPREDNISOLONE PER 125 MG: Performed by: INTERNAL MEDICINE

## 2023-05-11 PROCEDURE — 63710000001 INSULIN REGULAR HUMAN PER 5 UNITS: Performed by: INTERNAL MEDICINE

## 2023-05-11 PROCEDURE — 82948 REAGENT STRIP/BLOOD GLUCOSE: CPT

## 2023-05-11 RX ORDER — IPRATROPIUM BROMIDE AND ALBUTEROL SULFATE 2.5; .5 MG/3ML; MG/3ML
3 SOLUTION RESPIRATORY (INHALATION) ONCE
Status: COMPLETED | OUTPATIENT
Start: 2023-05-11 | End: 2023-05-11

## 2023-05-11 RX ORDER — METHYLPREDNISOLONE SODIUM SUCCINATE 125 MG/2ML
80 INJECTION, POWDER, LYOPHILIZED, FOR SOLUTION INTRAMUSCULAR; INTRAVENOUS ONCE
Status: COMPLETED | OUTPATIENT
Start: 2023-05-11 | End: 2023-05-11

## 2023-05-11 RX ADMIN — CASTOR OIL AND BALSAM, PERU 1 APPLICATION: 788; 87 OINTMENT TOPICAL at 22:47

## 2023-05-11 RX ADMIN — LOPERAMIDE HCL 2 MG: 1 SOLUTION ORAL at 20:39

## 2023-05-11 RX ADMIN — METOPROLOL TARTRATE 75 MG: 25 TABLET, FILM COATED ORAL at 09:53

## 2023-05-11 RX ADMIN — METOPROLOL TARTRATE 75 MG: 25 TABLET, FILM COATED ORAL at 20:38

## 2023-05-11 RX ADMIN — FAMOTIDINE 20 MG: 20 TABLET ORAL at 09:44

## 2023-05-11 RX ADMIN — CHLORHEXIDINE GLUCONATE 0.12% ORAL RINSE 15 ML: 1.2 LIQUID ORAL at 22:46

## 2023-05-11 RX ADMIN — NYSTATIN: 100000 POWDER TOPICAL at 22:49

## 2023-05-11 RX ADMIN — ANORECTAL OINTMENT 1 APPLICATION: 15.7; .44; 24; 20.6 OINTMENT TOPICAL at 09:44

## 2023-05-11 RX ADMIN — POTASSIUM CHLORIDE 20 MEQ: 1.5 POWDER, FOR SOLUTION ORAL at 09:43

## 2023-05-11 RX ADMIN — INSULIN HUMAN 4 UNITS: 100 INJECTION, SOLUTION PARENTERAL at 00:22

## 2023-05-11 RX ADMIN — HEPARIN SODIUM 5000 UNITS: 5000 INJECTION INTRAVENOUS; SUBCUTANEOUS at 06:59

## 2023-05-11 RX ADMIN — MUPIROCIN 1 APPLICATION: 20 OINTMENT TOPICAL at 09:44

## 2023-05-11 RX ADMIN — METHYLPREDNISOLONE SODIUM SUCCINATE 80 MG: 125 INJECTION, POWDER, FOR SOLUTION INTRAMUSCULAR; INTRAVENOUS at 21:13

## 2023-05-11 RX ADMIN — HEPARIN SODIUM 5000 UNITS: 5000 INJECTION INTRAVENOUS; SUBCUTANEOUS at 14:27

## 2023-05-11 RX ADMIN — MUPIROCIN 1 APPLICATION: 20 OINTMENT TOPICAL at 22:49

## 2023-05-11 RX ADMIN — NYSTATIN: 100000 POWDER TOPICAL at 09:44

## 2023-05-11 RX ADMIN — INSULIN HUMAN 4 UNITS: 100 INJECTION, SOLUTION PARENTERAL at 06:59

## 2023-05-11 RX ADMIN — AMLODIPINE BESYLATE 10 MG: 10 TABLET ORAL at 09:43

## 2023-05-11 RX ADMIN — INSULIN GLARGINE-YFGN 45 UNITS: 100 INJECTION, SOLUTION SUBCUTANEOUS at 09:43

## 2023-05-11 RX ADMIN — FUROSEMIDE 20 MG: 20 TABLET ORAL at 09:44

## 2023-05-11 RX ADMIN — ANORECTAL OINTMENT 1 APPLICATION: 15.7; .44; 24; 20.6 OINTMENT TOPICAL at 22:47

## 2023-05-11 RX ADMIN — CASTOR OIL AND BALSAM, PERU 1 APPLICATION: 788; 87 OINTMENT TOPICAL at 22:46

## 2023-05-11 RX ADMIN — LEVOFLOXACIN 750 MG: 750 TABLET, FILM COATED ORAL at 10:15

## 2023-05-11 RX ADMIN — Medication 10 ML: at 22:49

## 2023-05-11 RX ADMIN — IPRATROPIUM BROMIDE AND ALBUTEROL SULFATE 3 ML: 2.5; .5 SOLUTION RESPIRATORY (INHALATION) at 21:20

## 2023-05-11 RX ADMIN — CASTOR OIL AND BALSAM, PERU 1 APPLICATION: 788; 87 OINTMENT TOPICAL at 09:43

## 2023-05-11 RX ADMIN — CHLORHEXIDINE GLUCONATE 0.12% ORAL RINSE 15 ML: 1.2 LIQUID ORAL at 09:44

## 2023-05-11 RX ADMIN — FAMOTIDINE 20 MG: 20 TABLET ORAL at 20:39

## 2023-05-11 RX ADMIN — HEPARIN SODIUM 5000 UNITS: 5000 INJECTION INTRAVENOUS; SUBCUTANEOUS at 20:39

## 2023-05-11 RX ADMIN — INSULIN HUMAN 4 UNITS: 100 INJECTION, SOLUTION PARENTERAL at 17:49

## 2023-05-11 RX ADMIN — INSULIN HUMAN 8 UNITS: 100 INJECTION, SOLUTION PARENTERAL at 11:57

## 2023-05-11 NOTE — PROGRESS NOTES
"Nutrition Services    Patient Name:  Shama Cruz  YOB: 1955  MRN: [id number]  Admit Date:  5/4/2023  Assessment Date:  05/11/23    CLINICAL NUTRITION ASSESSMENT    Comments: TF follow up    Pt tolerating Diabetisource AC @ 65ml/hr and Gurmeet bid and water at [id number] via PEG. Diarrhea persists, getting imodium. FMS was removed yesterday. Labs, meds, skin reviewed. Na+ 133 yesterday, MD adjusted the free water. No new Na+ level today. Glucoses 174-200 today. Continue current TF regimen.     RD to follow clinical course and make further recommendations as warranted.     Encounter Information         Reason for Encounter TF follow up    Admitting Diagnosis Sepsis, acute hypoxic respiratory failure, hypernatremia, dementia, hx/o diabetes        Current Issues extubated. PEG tube in place.      Current Nutrition Orders & Evaluation of Intake       Oral Nutrition     Food Allergies NFFA   Current PO Diet NPO Diet NPO Type: Strict NPO   Supplement    PO Evaluation NPO     % PO Intake       Enteral Nutrition     Enteral Route PEG    TF Delivery Method Continuous    Enteral Product Diabetisource AC    Modular Gurmeet, BID    Propofol Rate/Kcal     TF Current Rate (mL) 65    TF Goal Rate (mL) 65    Current Water Flush (mL) 50mL q 4 hrs    Current TF Provision  1872kcal, 94 gm protein, 1279 mL free water + 500 mL water flushes         Calories 100 % needs met         Protein   % needs met         TF Fluid (mL) 1779mL         IV Fluids 0    Avg Volume Delivered (mL) Not well documented    % Goal Volume     TF Residual  no or minimal residual    TF Changes free water decreased    TF Tolerance tolerating, diarrhea/loose stools     Anthropometrics          Height    Weight Height: 162.6 cm (64.02\")  Weight: 72 kg (158 lb 11.7 oz) (05/11/23 0634)    BMI kg/m2 Body mass index is 27.23 kg/m².    BMI Category Overweight (25 - 29.9)    Weight History  Wt Readings from Last 15 Encounters:   05/11/23 0634 72 kg (158 lb " 11.7 oz)   05/10/23 0500 70.6 kg (155 lb 10.3 oz)   05/09/23 0546 73.1 kg (161 lb 2.5 oz)   05/08/23 0600 71.1 kg (156 lb 12 oz)   05/08/23 0300 71.1 kg (156 lb 12 oz)   05/07/23 0443 70.1 kg (154 lb 8.7 oz)   05/06/23 0400 69.5 kg (153 lb 3.5 oz)   05/05/23 0600 68.4 kg (150 lb 12.7 oz)   05/04/23 2236 68.4 kg (150 lb 12.7 oz)   05/04/23 0454 90.7 kg (200 lb)        Estimated/Assessed Needs        Energy Requirements    Weight for Calculation 68.4kg   Method for Estimation  25 kcal/kg, 30 kcal/kg   EST Needs (kcal/day) 3254-6353        Protein Requirements    Weight for Calculation 68.4kg    EST Protein Needs (g/kg) 1.2 - 1.5 gm/kg   EST Daily Needs (g/day) 82-103g        Fluid Requirements     Method for Estimation 1 mL/kcal    Estimated Needs (mL/day) 1710         Physical Findings          Physical Appearance obese, overweight, on oxygen therapy, sedate   Oral/Mouth Cavity tooth or teeth missing   Edema  generalized, 3+ (moderate)   Gastrointestinal diarrhea, last bowel movement: 5/11 liquid   Skin  pressure injury coccyx, heel, right ear   Tubes/Drains/Lines PEG   NFPE No clinical signs of muscle wasting or fat loss      Labs        Pertinent Labs Reviewed, listed below     Results from last 7 days   Lab Units 05/10/23  0352 05/09/23  2045 05/09/23  0703 05/08/23  0328   SODIUM mmol/L 133*  --  135* 138   POTASSIUM mmol/L 4.2 4.6 3.0* 3.9   CHLORIDE mmol/L 97*  --  97* 105   CO2 mmol/L 21.3*  --  23.0 24.0   BUN mg/dL 32*  --  31* 26*   CREATININE mg/dL 0.52*  --  0.52* 0.57   CALCIUM mg/dL 9.3  --  8.3* 8.8   GLUCOSE mg/dL 183*  --  244* 285*     Results from last 7 days   Lab Units 05/10/23  0352 05/09/23  0703 05/06/23  0322 05/05/23  0413   MAGNESIUM mg/dL  --  2.0  --  2.0   HEMOGLOBIN g/dL 12.2 11.2*   < >  --    HEMATOCRIT % 34.9 32.7*   < >  --    WBC 10*3/mm3 11.81* 13.34*   < >  --     < > = values in this interval not displayed.     Results from last 7 days   Lab Units 05/10/23  0352 05/09/23  0703  05/08/23  0328 05/07/23  1138 05/06/23  0322   PLATELETS 10*3/mm3 222 201 137* 113* 84*     No results found for: COVID19  No results found for: HGBA1C       Medications            Scheduled Medications acetaminophen, 1,000 mg, Oral, Once  amLODIPine, 10 mg, Per G Tube, Daily  castor oil-balsam peru, 1 application, Topical, Q12H  castor oil-balsam peru, 1 application, Topical, Q12H  chlorhexidine, 15 mL, Mouth/Throat, Q12H  famotidine, 20 mg, Oral, BID  furosemide, 20 mg, Oral, Daily  heparin (porcine), 5,000 Units, Subcutaneous, Q8H  insulin glargine, 45 Units, Subcutaneous, Daily  insulin regular, 4-24 Units, Subcutaneous, Q6H  Menthol-Zinc Oxide, 1 application, Topical, BID  metoprolol tartrate, 75 mg, Oral, Q12H  mupirocin, 1 application, Topical, Q12H  nystatin, , Topical, Q12H  potassium chloride, 20 mEq, Oral, Daily  Scopolamine, 1 patch, Transdermal, Q72H  sodium chloride, 30 mL/kg, Intravenous, Once  sodium chloride, 500 mL, Intravenous, Once  sodium chloride, 10 mL, Intravenous, Q12H        Infusions      PRN Medications •  dextrose  •  dextrose  •  glucagon (human recombinant)  •  labetalol  •  loperamide  •  potassium chloride **OR** potassium chloride **OR** potassium chloride  •  sodium chloride  •  sodium chloride     PES STATEMENT / NUTRITION DIAGNOSIS      Nutrition Dx Problem  Problem: Needs Alternative Route  Etiology: Medical Diagnosis Dysphagia, Resp failure/vent  Signs/Symptoms: NPO    Comment: PEG tube in place     PLAN OF CARE  Intervention Goal        Intervention Goal(s) Maintain nutrition status, Nutrition support treatment, Meet estimated needs, Disease management/therapy, Maintain TF/PN, Tolerate TF/PN at goal and No significant weight loss     Nutrition Intervention         RD Action Follow Tx Progress and Care plan reviewed     Nutrition Prescription          Recommendation       Prescription Ordered No changes at this time     Monitor/Evaluation        Monitor Per protocol     RD to  follow up per protocol.    Electronically signed by:  Meg Zhao RD  05/11/23 15:32 EDT

## 2023-05-11 NOTE — PLAN OF CARE
Problem: Adult Inpatient Plan of Care  Goal: Plan of Care Review  Outcome: Ongoing, Progressing  Flowsheets (Taken 5/11/2023 3222)  Plan of Care Reviewed With: patient  Outcome Evaluation: pt resting on and off w eyes closed, immodium given for diarrhea, minimal residuals noted w tubefeed.  no distress noted will cont to monitor   Goal Outcome Evaluation:  Plan of Care Reviewed With: patient           Outcome Evaluation: pt resting on and off w eyes closed, immodium given for diarrhea, minimal residuals noted w tubefeed.  no distress noted will cont to monitor

## 2023-05-11 NOTE — PROGRESS NOTES
"  PROGRESS NOTE  Patient Name: Shama Cruz  Age/Sex: 68 y.o. female  : 1955  MRN: [id number]    Date of Admission: 2023  Date of Encounter Visit: 23   LOS: 7 days   Patient Care Team:  Archie Murray MD as PCP - General (Pulmonary Disease)    Chief Complaint: Respiratory failure    Hospital course: Patient Liberated from the ventilator on 2023 and was doing fine however today she was having more stridor like sounds and she did have a large mucous plug lesion that was aspirated from the back of her throat on oral care.  She could not keep her saturation in the normal range on room air and had to be started on low flow nasal cannula oxygen  She is arousable she does follow commands but she moans all the lung  Tolerating tube feeding  Hemodynamically stable        REVIEW OF SYSTEMS:   CONSTITUTIONAL: no fever or chills  Otherwise limited system review.     Ventilator/Non-Invasive Ventilation Settings (From admission, onward)         Vital Signs  Temp:  [96.6 °F (35.9 °C)-98.8 °F (37.1 °C)] 98.2 °F (36.8 °C)  Heart Rate:  [] 84  Resp:  [16] 16  BP: (110-161)/(67-94) 120/67  SpO2:  [93 %-96 %] 95 %  on  Flow (L/min):  [1.5-2] 1.5 Device (Oxygen Therapy): nasal cannula    Intake/Output Summary (Last 24 hours) at 2023 1312  Last data filed at 2023 0634  Gross per 24 hour   Intake 1325 ml   Output 600 ml   Net 725 ml     Flowsheet Rows    Flowsheet Row First Filed Value   Admission Height 162.6 cm (64\") Documented at 2023 0454   Admission Weight 90.7 kg (200 lb) Documented at 2023 0454        Body mass index is 27.23 kg/m².      23  0546 05/10/23  0500 23  0634   Weight: 73.1 kg (161 lb 2.5 oz) 70.6 kg (155 lb 10.3 oz) 72 kg (158 lb 11.7 oz)       Physical Exam:  GEN:   awake, responsive, confused, able to interact with examiner, patient is nonverbal  EYES:   Sclerae clear. No icterus. PERRL. Normal EOM  ENT:   External ears/nose normal, no oral lesions, " no thrush, mucous membranes moist   NECK:  Supple, midline trachea, no JVD  LUNGS: Normal chest on inspection, positive stridor, positive rhonchi however difficult to assess rhonchi on exhalation because of constant moaning  CV:  Regular rhythm and rate. Normal S1/S2. No murmurs, gallops, or rubs noted.  ABD:  Soft, nontender and nondistended. Normal bowel sounds. No guarding.  PEG tube site is clean with no discharge or erythema  EXT: Patient was not moving the right upper extremity as well same for the right lower extremity, she does follow commands on the left. No cyanosis. No redness.  Resolved edema.   Skin: Dry, intact, no bleeding    Results Review:    Results From Last 14 Days   Lab Units 05/05/23  0610 05/04/23 2328 05/04/23  1655   LACTATE mmol/L 2.7* 2.7* 3.6*     Results from last 7 days   Lab Units 05/10/23  0352 05/09/23  2045 05/09/23  0703 05/08/23  0328 05/07/23  0530 05/06/23  2326 05/06/23  0322 05/05/23  0413   SODIUM mmol/L 133*  --  135* 138  --   --  137 142   POTASSIUM mmol/L 4.2 4.6 3.0* 3.9  --  4.6 2.9* 4.1   CHLORIDE mmol/L 97*  --  97* 105  --   --  107 112*   CO2 mmol/L 21.3*  --  23.0 24.0  --   --  19.6* 21.0*   BUN mg/dL 32*  --  31* 26*  --   --  17 25*   CREATININE mg/dL 0.52*  --  0.52* 0.57 0.50*  --  0.38* 0.50*   CALCIUM mg/dL 9.3  --  8.3* 8.8  --   --  6.9* 7.0*   ANION GAP mmol/L 14.7  --  15.0 9.0  --   --  10.4 9.0                 Results from last 7 days   Lab Units 05/10/23  0352 05/09/23  0703 05/08/23  0328 05/07/23  1138 05/06/23  0322 05/05/23  0358   WBC 10*3/mm3 11.81* 13.34* 8.35 10.98* 9.01 11.61*   HEMOGLOBIN g/dL 12.2 11.2* 9.1* 9.2* 8.5* 9.5*   HEMATOCRIT % 34.9 32.7* 27.6* 26.7* 24.1* 28.4*   PLATELETS 10*3/mm3 222 201 137* 113* 84* 85*   MCV fL 95.9 97.0 100.4* 96.7 95.3 99.3*   NEUTROPHIL % %  --   --   --  88.9* 78.1* 78.1*   LYMPHOCYTE % %  --   --   --  5.8* 13.9* 14.1*   MONOCYTES % %  --   --   --  3.8* 4.4* 5.4   EOSINOPHIL % %  --   --   --  0.0*  2.8 1.6   BASOPHIL % %  --   --   --  0.1 0.2 0.3         Results from last 7 days   Lab Units 05/09/23  0703 05/05/23  0413   MAGNESIUM mg/dL 2.0 2.0           Invalid input(s): LDLCALC  Results from last 7 days   Lab Units 05/08/23  0831 05/07/23  0341 05/06/23  0308 05/05/23  1209 05/05/23  0804 05/05/23  0328   PH, ARTERIAL pH units 7.444 7.445 7.465* 7.416 7.407 7.400   PCO2, ARTERIAL mm Hg 37.2 33.6* 31.5* 33.0* 35.1 32.8*   PO2 ART mm Hg 78.3* 81.2 83.8 101.7* 93.8 92.8   HCO3 ART mmol/L 25.5 23.1 22.7 21.3* 22.1 20.3*         Glucose   Date/Time Value Ref Range Status   05/11/2023 1120 200 (H) 70 - 130 mg/dL Final     Comment:     Meter: [id number] : [id number] Harjinder Hernandez    05/11/2023 0637 174 (H) 70 - 130 mg/dL Final     Comment:     Meter: [id number] : [id number] Marisol Aiken    05/11/2023 0007 177 (H) 70 - 130 mg/dL Final     Comment:     Meter: [id number] : [id number] Marisol Aiken    05/10/2023 1735 183 (H) 70 - 130 mg/dL Final     Comment:     Meter: [id number] : [id number] Kayode Mallory    05/10/2023 1140 274 (H) 70 - 130 mg/dL Final     Comment:     Meter: [id number] : [id number] Carltonelliemarcin Shawnee    05/10/2023 0629 242 (H) 70 - 130 mg/dL Final     Comment:     Meter: [id number] : [id number] Patience Blackmon NA   05/09/2023 2356 231 (H) 70 - 130 mg/dL Final     Comment:     Meter: [id number] : [id number] Patience Alexia NA   05/09/2023 1801 188 (H) 70 - 130 mg/dL Final     Comment:     Meter: [id number] : [id number] Ubaldo STRONG     Results from last 7 days   Lab Units 05/05/23  0610 05/04/23  2328 05/04/23  1655   LACTATE mmol/L 2.7* 2.7* 3.6*     Results from last 7 days   Lab Units 05/07/23  1151 05/06/23  0940   RESPCX  No growth No growth at 2 days                       Imaging:   Imaging Results (All)     Procedure Component Value Units Date/Time       I reviewed the patient's new clinical results.  I personally viewed and interpreted  the patient's imaging results:        Medication Review:   acetaminophen, 1,000 mg, Oral, Once  amLODIPine, 10 mg, Per G Tube, Daily  castor oil-balsam peru, 1 application, Topical, Q12H  castor oil-balsam peru, 1 application, Topical, Q12H  chlorhexidine, 15 mL, Mouth/Throat, Q12H  famotidine, 20 mg, Oral, BID  furosemide, 20 mg, Oral, Daily  heparin (porcine), 5,000 Units, Subcutaneous, Q8H  insulin glargine, 45 Units, Subcutaneous, Daily  insulin regular, 4-24 Units, Subcutaneous, Q6H  Menthol-Zinc Oxide, 1 application, Topical, BID  metoprolol tartrate, 75 mg, Oral, Q12H  mupirocin, 1 application, Topical, Q12H  nystatin, , Topical, Q12H  potassium chloride, 20 mEq, Oral, Daily  Scopolamine, 1 patch, Transdermal, Q72H  sodium chloride, 30 mL/kg, Intravenous, Once  sodium chloride, 500 mL, Intravenous, Once  sodium chloride, 10 mL, Intravenous, Q12H             ASSESSMENT:   1. Acute hypoxemic respiratory failure, failed extubation and was reintubated on 5/5/2023, liberated again on 5/8/2023  2. Sepsis  3. Pneumonia, on Zosyn/VANC, positive MRSA screen  4. UTI -updated culture results is now showing Klebsiella aerogenes, Resistant to Zosyn but susceptible to cefepime, sensitive to quinolones and to aminoglycosides and to Bactrim  5. MRSA swab positive however patient has no infiltrate  6. Lactic acidosis   7. Hypokalemia   8. Mild hypernatremia  9. Diabetes with hyperglycemia, worsened by steroids  10. Dementia  11. Management of mechanical ventilation.  12. Thrombocytopenia, platelet counts in the 80s and has been stable  13. Status post PEG  14. 3 cm indeterminate right adrenal nodule. - outpt follow up  15. History of prior stroke  16. Hypertension with tachycardia, noted most postextubation  17. New onset stridor 5/11/2022    PLAN:  Patient was doing better however with her altered mental status she may not be able to clear her airway very well  Discussed with CCP, working progress to get a guardian since her  daughter is also at the nursing home  She is already on the Lasix and she seems to have no more fluid overload  She does have the stridor, patient is on scopolamine to help with the secretion, she is on the antihypertensive medication, she is completely n.p.o. and on tube feeding.  Despite her frail status, I do not see much that I can add at this point except for continuing current measures and continue to monitor and intervene when needed  Given her chronic irreversible advanced condition, patient would be a great candidate for a DNR DNI and will try to discuss with her new guardian, once appointed by the court  Glycemic control improved significantly after she was taken of the prednisone, and patient is currently on the Lantus at a higher dose which will be continued without any further adjustment today    Bryan Cabrera MD  05/11/23  13:12 EDT        Dictated utilizing Dragon dictation

## 2023-05-12 ENCOUNTER — APPOINTMENT (OUTPATIENT)
Dept: GENERAL RADIOLOGY | Facility: HOSPITAL | Age: 68
End: 2023-05-12
Payer: MEDICARE

## 2023-05-12 LAB
ANION GAP SERPL CALCULATED.3IONS-SCNC: 16.2 MMOL/L (ref 5–15)
ARTERIAL PATENCY WRIST A: ABNORMAL
ATMOSPHERIC PRESS: 748.5 MMHG
BASE EXCESS BLDA CALC-SCNC: -0.7 MMOL/L (ref 0–2)
BASOPHILS # BLD AUTO: 0.04 10*3/MM3 (ref 0–0.2)
BASOPHILS NFR BLD AUTO: 0.4 % (ref 0–1.5)
BDY SITE: ABNORMAL
BUN SERPL-MCNC: 45 MG/DL (ref 8–23)
BUN/CREAT SERPL: 60 (ref 7–25)
CALCIUM SPEC-SCNC: 9.8 MG/DL (ref 8.6–10.5)
CHLORIDE SERPL-SCNC: 97 MMOL/L (ref 98–107)
CO2 SERPL-SCNC: 22.8 MMOL/L (ref 22–29)
CREAT SERPL-MCNC: 0.75 MG/DL (ref 0.57–1)
DEPRECATED RDW RBC AUTO: 52.3 FL (ref 37–54)
EGFRCR SERPLBLD CKD-EPI 2021: 87.4 ML/MIN/1.73
EOSINOPHIL # BLD AUTO: 0 10*3/MM3 (ref 0–0.4)
EOSINOPHIL NFR BLD AUTO: 0 % (ref 0.3–6.2)
ERYTHROCYTE [DISTWIDTH] IN BLOOD BY AUTOMATED COUNT: 15 % (ref 12.3–15.4)
GAS FLOW AIRWAY: 2 LPM
GLUCOSE BLDC GLUCOMTR-MCNC: 196 MG/DL (ref 70–130)
GLUCOSE BLDC GLUCOMTR-MCNC: 216 MG/DL (ref 70–130)
GLUCOSE BLDC GLUCOMTR-MCNC: 232 MG/DL (ref 70–130)
GLUCOSE BLDC GLUCOMTR-MCNC: 255 MG/DL (ref 70–130)
GLUCOSE BLDC GLUCOMTR-MCNC: 317 MG/DL (ref 70–130)
GLUCOSE SERPL-MCNC: 238 MG/DL (ref 65–99)
HCO3 BLDA-SCNC: 24.9 MMOL/L (ref 22–28)
HCT VFR BLD AUTO: 36.8 % (ref 34–46.6)
HGB BLD-MCNC: 12.4 G/DL (ref 12–15.9)
IMM GRANULOCYTES # BLD AUTO: 0.21 10*3/MM3 (ref 0–0.05)
IMM GRANULOCYTES NFR BLD AUTO: 2.2 % (ref 0–0.5)
LYMPHOCYTES # BLD AUTO: 0.95 10*3/MM3 (ref 0.7–3.1)
LYMPHOCYTES NFR BLD AUTO: 10.1 % (ref 19.6–45.3)
MAGNESIUM SERPL-MCNC: 2.3 MG/DL (ref 1.6–2.4)
MCH RBC QN AUTO: 32.7 PG (ref 26.6–33)
MCHC RBC AUTO-ENTMCNC: 33.7 G/DL (ref 31.5–35.7)
MCV RBC AUTO: 97.1 FL (ref 79–97)
MODALITY: ABNORMAL
MONOCYTES # BLD AUTO: 0.24 10*3/MM3 (ref 0.1–0.9)
MONOCYTES NFR BLD AUTO: 2.6 % (ref 5–12)
NEUTROPHILS NFR BLD AUTO: 7.94 10*3/MM3 (ref 1.7–7)
NEUTROPHILS NFR BLD AUTO: 84.7 % (ref 42.7–76)
NRBC BLD AUTO-RTO: 0.3 /100 WBC (ref 0–0.2)
PCO2 BLDA: 43.3 MM HG (ref 35–45)
PH BLDA: 7.37 PH UNITS (ref 7.35–7.45)
PHOSPHATE SERPL-MCNC: 4.8 MG/DL (ref 2.5–4.5)
PLATELET # BLD AUTO: 247 10*3/MM3 (ref 140–450)
PMV BLD AUTO: 12.4 FL (ref 6–12)
PO2 BLDA: 99.9 MM HG (ref 80–100)
POTASSIUM SERPL-SCNC: 4.9 MMOL/L (ref 3.5–5.2)
PROCALCITONIN SERPL-MCNC: 0.18 NG/ML (ref 0–0.25)
RBC # BLD AUTO: 3.79 10*6/MM3 (ref 3.77–5.28)
SAO2 % BLDCOA: 97.5 % (ref 92–99)
SODIUM SERPL-SCNC: 136 MMOL/L (ref 136–145)
TOTAL RATE: 24 BREATHS/MINUTE
WBC NRBC COR # BLD: 9.38 10*3/MM3 (ref 3.4–10.8)

## 2023-05-12 PROCEDURE — 25010000002 HEPARIN (PORCINE) PER 1000 UNITS: Performed by: INTERNAL MEDICINE

## 2023-05-12 PROCEDURE — 83735 ASSAY OF MAGNESIUM: CPT | Performed by: INTERNAL MEDICINE

## 2023-05-12 PROCEDURE — 94799 UNLISTED PULMONARY SVC/PX: CPT

## 2023-05-12 PROCEDURE — 84145 PROCALCITONIN (PCT): CPT | Performed by: INTERNAL MEDICINE

## 2023-05-12 PROCEDURE — 82803 BLOOD GASES ANY COMBINATION: CPT

## 2023-05-12 PROCEDURE — 82948 REAGENT STRIP/BLOOD GLUCOSE: CPT

## 2023-05-12 PROCEDURE — 85025 COMPLETE CBC W/AUTO DIFF WBC: CPT | Performed by: INTERNAL MEDICINE

## 2023-05-12 PROCEDURE — 63710000001 INSULIN REGULAR HUMAN PER 5 UNITS: Performed by: INTERNAL MEDICINE

## 2023-05-12 PROCEDURE — 36600 WITHDRAWAL OF ARTERIAL BLOOD: CPT

## 2023-05-12 PROCEDURE — 84100 ASSAY OF PHOSPHORUS: CPT | Performed by: INTERNAL MEDICINE

## 2023-05-12 PROCEDURE — 80048 BASIC METABOLIC PNL TOTAL CA: CPT | Performed by: INTERNAL MEDICINE

## 2023-05-12 PROCEDURE — 71045 X-RAY EXAM CHEST 1 VIEW: CPT

## 2023-05-12 PROCEDURE — 94761 N-INVAS EAR/PLS OXIMETRY MLT: CPT

## 2023-05-12 PROCEDURE — 94664 DEMO&/EVAL PT USE INHALER: CPT

## 2023-05-12 RX ORDER — GUAIFENESIN 200 MG/10ML
200 LIQUID ORAL EVERY 4 HOURS PRN
Status: DISCONTINUED | OUTPATIENT
Start: 2023-05-12 | End: 2023-05-17 | Stop reason: HOSPADM

## 2023-05-12 RX ORDER — ACETYLCYSTEINE 200 MG/ML
3 SOLUTION ORAL; RESPIRATORY (INHALATION)
Status: DISCONTINUED | OUTPATIENT
Start: 2023-05-12 | End: 2023-05-15

## 2023-05-12 RX ORDER — IPRATROPIUM BROMIDE AND ALBUTEROL SULFATE 2.5; .5 MG/3ML; MG/3ML
3 SOLUTION RESPIRATORY (INHALATION)
Status: DISCONTINUED | OUTPATIENT
Start: 2023-05-12 | End: 2023-05-17 | Stop reason: HOSPADM

## 2023-05-12 RX ADMIN — CASTOR OIL AND BALSAM, PERU 1 APPLICATION: 788; 87 OINTMENT TOPICAL at 10:30

## 2023-05-12 RX ADMIN — INSULIN HUMAN 4 UNITS: 100 INJECTION, SOLUTION PARENTERAL at 18:27

## 2023-05-12 RX ADMIN — MUPIROCIN 1 APPLICATION: 20 OINTMENT TOPICAL at 10:30

## 2023-05-12 RX ADMIN — NYSTATIN 1 APPLICATION: 100000 POWDER TOPICAL at 20:53

## 2023-05-12 RX ADMIN — ANORECTAL OINTMENT 1 APPLICATION: 15.7; .44; 24; 20.6 OINTMENT TOPICAL at 10:30

## 2023-05-12 RX ADMIN — INSULIN HUMAN 12 UNITS: 100 INJECTION, SOLUTION PARENTERAL at 06:45

## 2023-05-12 RX ADMIN — FAMOTIDINE 20 MG: 20 TABLET ORAL at 10:30

## 2023-05-12 RX ADMIN — Medication 10 ML: at 10:31

## 2023-05-12 RX ADMIN — METOPROLOL TARTRATE 75 MG: 25 TABLET, FILM COATED ORAL at 20:50

## 2023-05-12 RX ADMIN — ACETYLCYSTEINE 3 ML: 200 SOLUTION ORAL; RESPIRATORY (INHALATION) at 17:36

## 2023-05-12 RX ADMIN — HEPARIN SODIUM 5000 UNITS: 5000 INJECTION INTRAVENOUS; SUBCUTANEOUS at 05:36

## 2023-05-12 RX ADMIN — CHLORHEXIDINE GLUCONATE 0.12% ORAL RINSE 15 ML: 1.2 LIQUID ORAL at 10:30

## 2023-05-12 RX ADMIN — FUROSEMIDE 20 MG: 20 TABLET ORAL at 10:27

## 2023-05-12 RX ADMIN — AMLODIPINE BESYLATE 10 MG: 10 TABLET ORAL at 10:27

## 2023-05-12 RX ADMIN — INSULIN HUMAN 8 UNITS: 100 INJECTION, SOLUTION PARENTERAL at 13:17

## 2023-05-12 RX ADMIN — Medication 10 ML: at 20:54

## 2023-05-12 RX ADMIN — ANORECTAL OINTMENT 1 APPLICATION: 15.7; .44; 24; 20.6 OINTMENT TOPICAL at 20:53

## 2023-05-12 RX ADMIN — CASTOR OIL AND BALSAM, PERU 1 APPLICATION: 788; 87 OINTMENT TOPICAL at 20:51

## 2023-05-12 RX ADMIN — POTASSIUM CHLORIDE 20 MEQ: 1.5 POWDER, FOR SOLUTION ORAL at 10:31

## 2023-05-12 RX ADMIN — MUPIROCIN 1 APPLICATION: 20 OINTMENT TOPICAL at 20:52

## 2023-05-12 RX ADMIN — METOPROLOL TARTRATE 75 MG: 25 TABLET, FILM COATED ORAL at 10:27

## 2023-05-12 RX ADMIN — HEPARIN SODIUM 5000 UNITS: 5000 INJECTION INTRAVENOUS; SUBCUTANEOUS at 20:55

## 2023-05-12 RX ADMIN — HEPARIN SODIUM 5000 UNITS: 5000 INJECTION INTRAVENOUS; SUBCUTANEOUS at 16:15

## 2023-05-12 RX ADMIN — CASTOR OIL AND BALSAM, PERU 1 APPLICATION: 788; 87 OINTMENT TOPICAL at 13:17

## 2023-05-12 RX ADMIN — INSULIN GLARGINE-YFGN 45 UNITS: 100 INJECTION, SOLUTION SUBCUTANEOUS at 10:27

## 2023-05-12 RX ADMIN — INSULIN HUMAN 16 UNITS: 100 INJECTION, SOLUTION PARENTERAL at 01:01

## 2023-05-12 RX ADMIN — FAMOTIDINE 20 MG: 20 TABLET ORAL at 20:50

## 2023-05-12 RX ADMIN — NYSTATIN: 100000 POWDER TOPICAL at 10:30

## 2023-05-12 RX ADMIN — IPRATROPIUM BROMIDE AND ALBUTEROL SULFATE 3 ML: 2.5; .5 SOLUTION RESPIRATORY (INHALATION) at 17:37

## 2023-05-12 NOTE — CASE MANAGEMENT/SOCIAL WORK
Continued Stay Note  [facility]     Patient Name: Shama Cruz  MRN: [id number]  Today's Date: 5/12/2023    Admit Date: 5/4/2023    Plan: Return to LTC bed at [facility].  Emergency guardianship petition to be filed for expedited hearing.   Discharge Plan       Row Name 05/12/23 1612       Plan    Plan Return to LTC bed at [facility].  Emergency guardianship petition to be filed for expedited hearing.    Plan Comments Received request from attending MD to request the guardianship hearing scheduled for 6/7/2023 be heard as an emergency in order to have a medical decision maker for needed care.  Call placed to [facility]  He approved the emergency filing.  Paperwork will be filed on Monday am and the new hearing date will be assigned.  Jeri HERNANDEZ                   Discharge Codes    No documentation.                       Jeri Otto RN

## 2023-05-12 NOTE — CODE DOCUMENTATION
RRT at bedside.  Normal ABG noted.  Call placed to Dr. Cabrera by primary RN prior to RRT arrival.  Awaiting call back at this time.  Patient on 2L with SpO2 99%.  No stridor noted at this time.

## 2023-05-12 NOTE — PLAN OF CARE
Goal Outcome Evaluation:   VSS. Alert, nods but does not verbally respond to questions, moans frequently. Follows commands appropriately, right side flaccid. 02 increased to 3lpm, use of abdominal muscles noted, tachypneic, rhonchi noted throughout. Page placed to pulm. X1 duoneb given, x1 dose of solu medrol. Labs ordered for AM. Tube feed infusing at goal rate. Immodium given for loose stools. Call light in reach.

## 2023-05-12 NOTE — PLAN OF CARE
Problem: Adult Inpatient Plan of Care  Goal: Plan of Care Review  Flowsheets (Taken 5/12/2023 1708)  Progress: declining  Plan of Care Reviewed With: patient  Outcome Evaluation: Pt with stridorous event while completing incontinence care, RR called for distress,  took patient a while to recover, pts oxygen demand has increased as the day has progressed, 7lhighflow, cxr completed, aprn notified, orders received, turn q2hrs, specialty bed inplace.  Goal: Absence of Hospital-Acquired Illness or Injury  Intervention: Identify and Manage Fall Risk  Recent Flowsheet Documentation  Taken 5/12/2023 1601 by Shayy Nascimento RN  Safety Promotion/Fall Prevention:   safety round/check completed   room organization consistent   nonskid shoes/slippers when out of bed   lighting adjusted   fall prevention program maintained   assistive device/personal items within reach   clutter free environment maintained  Taken 5/12/2023 1450 by Shayy Nascimento RN  Safety Promotion/Fall Prevention:   safety round/check completed   room organization consistent   nonskid shoes/slippers when out of bed   lighting adjusted   fall prevention program maintained   clutter free environment maintained   assistive device/personal items within reach  Taken 5/12/2023 1250 by Shayy Nascimento RN  Safety Promotion/Fall Prevention:   safety round/check completed   room organization consistent   nonskid shoes/slippers when out of bed   lighting adjusted   fall prevention program maintained   assistive device/personal items within reach   clutter free environment maintained  Taken 5/12/2023 1027 by Shayy Nascimento RN  Safety Promotion/Fall Prevention:   safety round/check completed   room organization consistent   nonskid shoes/slippers when out of bed   lighting adjusted   fall prevention program maintained   clutter free environment maintained   assistive device/personal items within reach  Taken 5/12/2023 0830 by Shayy Nascimento RN  Safety  Promotion/Fall Prevention:   safety round/check completed   room organization consistent   nonskid shoes/slippers when out of bed   lighting adjusted   fall prevention program maintained   clutter free environment maintained   assistive device/personal items within reach  Intervention: Prevent Skin Injury  Recent Flowsheet Documentation  Taken 5/12/2023 1450 by Shayy Nascimento RN  Skin Protection: tubing/devices free from skin contact  Taken 5/12/2023 0830 by Shayy Nascimento RN  Skin Protection:   tubing/devices free from skin contact   skin sealant/moisture barrier applied   silicone foam dressing in place   incontinence pads utilized   hydrocolloids used  Intervention: Prevent and Manage VTE (Venous Thromboembolism) Risk  Recent Flowsheet Documentation  Taken 5/12/2023 0830 by Shayy Nascimento RN  Activity Management: bedrest  VTE Prevention/Management:   bilateral   sequential compression devices on  Goal: Optimal Comfort and Wellbeing  Intervention: Provide Person-Centered Care  Recent Flowsheet Documentation  Taken 5/12/2023 1450 by Shayy Nascimento RN  Trust Relationship/Rapport: care explained  Taken 5/12/2023 0830 by Shayy Nascimento RN  Trust Relationship/Rapport: care explained   Goal Outcome Evaluation:  Plan of Care Reviewed With: patient        Progress: declining  Outcome Evaluation: Pt with stridorous event while completing incontinence care, RR called for distress,  took patient a while to recover, pts oxygen demand has increased as the day has progressed, 7lhighflow, cxr completed, aprn notified, orders received, turn q2hrs, specialty bed inplace.

## 2023-05-12 NOTE — PROGRESS NOTES
"  PROGRESS NOTE  Patient Name: Shama Cruz  Age/Sex: 68 y.o. female  : 1955  MRN: [id number]    Date of Admission: 2023  Date of Encounter Visit: 23   LOS: 8 days   Patient Care Team:  Archie Murray MD as PCP - General (Pulmonary Disease)    Chief Complaint: Respiratory failure    Hospital course: Patient Liberated from the ventilator on 2023 and was doing fine however she started to develop recurrent stridor  The stridor is positional and does improve with readjusting the neck over the body position and after throat suctioning.  She is still getting some thick secretion and some of these stridor episodes are even associated with some sinus pauses the longest one was up to 4 seconds according to the nursing staff.  Rapid response was called this morning to assess for that specific reason.  She is moaning on and off all day  She is definitely encephalopathic but her baseline seems to be abnormal to start with, attempts to contact her nursing facility to get an idea about her baseline has not been successful  Tolerating tube feeding  Hemodynamically stable  Currently on 4 L/min        REVIEW OF SYSTEMS:   CONSTITUTIONAL: no fever or chills  Otherwise limited system review.     Ventilator/Non-Invasive Ventilation Settings (From admission, onward)         Vital Signs  Temp:  [97.5 °F (36.4 °C)-98.2 °F (36.8 °C)] 98 °F (36.7 °C)  Heart Rate:  [] 109  Resp:  [14-22] 14  BP: (103-151)/(67-89) 103/67  SpO2:  [88 %-99 %] 93 %  on  Flow (L/min):  [1.5-3] 3 Device (Oxygen Therapy): nasal cannula    Intake/Output Summary (Last 24 hours) at 2023 1017  Last data filed at 2023 0505  Gross per 24 hour   Intake --   Output 1750 ml   Net -1750 ml     Flowsheet Rows    Flowsheet Row First Filed Value   Admission Height 162.6 cm (64\") Documented at 2023 0454   Admission Weight 90.7 kg (200 lb) Documented at 2023 0454        Body mass index is 25.34 kg/m².      05/10/23  0500 " 05/11/23  0634 05/12/23  0300   Weight: 70.6 kg (155 lb 10.3 oz) 72 kg (158 lb 11.7 oz) 67 kg (147 lb 11.3 oz)       Physical Exam:  GEN:   awake, responsive, confused, able to interact with examiner, patient is nonverbal  EYES:   Sclerae clear. No icterus. PERRL. Normal EOM  ENT:   External ears/nose normal, no oral lesions, no thrush, mucous membranes moist   NECK:  Supple, midline trachea, no JVD  LUNGS: Normal chest on inspection, stridor is not as audible today but she is moaning on and off, the lung is positive for minimal rhonchi.  No wheezing, positive cough which is more frequent than yesterday  CV:  Regular rhythm and rate. Normal S1/S2. No murmurs, gallops, or rubs noted.  ABD:  Soft, nontender and nondistended. Normal bowel sounds. No guarding.  PEG tube site is clean with no discharge or erythema  EXT: Patient was not moving the right upper extremity as well same for the right lower extremity, she does follow commands on the left. No cyanosis. No redness.  Resolved edema.   Skin: Dry, intact, no bleeding    Results Review:    Results From Last 14 Days   Lab Units 05/05/23  0610 05/04/23 2328 05/04/23  1655   LACTATE mmol/L 2.7* 2.7* 3.6*     Results from last 7 days   Lab Units 05/12/23  0636 05/10/23  0352 05/09/23  2045 05/09/23  0703 05/08/23  0328 05/07/23  0530 05/06/23  2326 05/06/23  0322   SODIUM mmol/L 136 133*  --  135* 138  --   --  137   POTASSIUM mmol/L 4.9 4.2 4.6 3.0* 3.9  --  4.6 2.9*   CHLORIDE mmol/L 97* 97*  --  97* 105  --   --  107   CO2 mmol/L 22.8 21.3*  --  23.0 24.0  --   --  19.6*   BUN mg/dL 45* 32*  --  31* 26*  --   --  17   CREATININE mg/dL 0.75 0.52*  --  0.52* 0.57 0.50*  --  0.38*   CALCIUM mg/dL 9.8 9.3  --  8.3* 8.8  --   --  6.9*   ANION GAP mmol/L 16.2* 14.7  --  15.0 9.0  --   --  10.4                 Results from last 7 days   Lab Units 05/12/23  0636 05/10/23  0352 05/09/23  0703 05/08/23  0328 05/07/23  1138 05/06/23  0322   WBC 10*3/mm3 9.38 11.81* 13.34* 8.35  10.98* 9.01   HEMOGLOBIN g/dL 12.4 12.2 11.2* 9.1* 9.2* 8.5*   HEMATOCRIT % 36.8 34.9 32.7* 27.6* 26.7* 24.1*   PLATELETS 10*3/mm3 247 222 201 137* 113* 84*   MCV fL 97.1* 95.9 97.0 100.4* 96.7 95.3   NEUTROPHIL % % 84.7*  --   --   --  88.9* 78.1*   LYMPHOCYTE % % 10.1*  --   --   --  5.8* 13.9*   MONOCYTES % % 2.6*  --   --   --  3.8* 4.4*   EOSINOPHIL % % 0.0*  --   --   --  0.0* 2.8   BASOPHIL % % 0.4  --   --   --  0.1 0.2   IMM GRAN % % 2.2*  --   --   --   --   --          Results from last 7 days   Lab Units 05/12/23  0636 05/09/23  0703   MAGNESIUM mg/dL 2.3 2.0           Invalid input(s): LDLCALC  Results from last 7 days   Lab Units 05/12/23  0953 05/08/23  0831 05/07/23  0341 05/06/23  0308 05/05/23  1209   PH, ARTERIAL pH units 7.367 7.444 7.445 7.465* 7.416   PCO2, ARTERIAL mm Hg 43.3 37.2 33.6* 31.5* 33.0*   PO2 ART mm Hg 99.9 78.3* 81.2 83.8 101.7*   HCO3 ART mmol/L 24.9 25.5 23.1 22.7 21.3*         Glucose   Date/Time Value Ref Range Status   05/12/2023 0941 232 (H) 70 - 130 mg/dL Final     Comment:     Meter: [id number] : [id number] Kei STRONG   05/12/2023 0636 255 (H) 70 - 130 mg/dL Final     Comment:     Meter: [id number] : [id number] Marisol STRONG   05/12/2023 0052 317 (H) 70 - 130 mg/dL Final     Comment:     Meter: [id number] : [id number] Marisol STRONG   05/11/2023 1654 166 (H) 70 - 130 mg/dL Final     Comment:     Meter: [id number] : [id number] Kayode STRONG   05/11/2023 1120 200 (H) 70 - 130 mg/dL Final     Comment:     Meter: [id number] : [id number] Grandesaadia Hernandez LIGIA   05/11/2023 0637 174 (H) 70 - 130 mg/dL Final     Comment:     Meter: [id number] : [id number] Marisol STRONG   05/11/2023 0007 177 (H) 70 - 130 mg/dL Final     Comment:     Meter: [id number] : [id number] Marisol Nelli NA   05/10/2023 1735 183 (H) 70 - 130 mg/dL Final     Comment:     Meter: [id number] : [id number] Kayode STRONG     Results from last 7 days   Lab  Units 05/12/23  0636   PROCALCITONIN ng/mL 0.18     Results from last 7 days   Lab Units 05/07/23  1151 05/06/23  0940   RESPCX  No growth No growth at 2 days                       Imaging:   Imaging Results (All)     Procedure Component Value Units Date/Time       I reviewed the patient's new clinical results.  I personally viewed and interpreted the patient's imaging results:        Medication Review:   acetaminophen, 1,000 mg, Oral, Once  amLODIPine, 10 mg, Per G Tube, Daily  castor oil-balsam peru, 1 application, Topical, Q12H  castor oil-balsam peru, 1 application, Topical, Q12H  chlorhexidine, 15 mL, Mouth/Throat, Q12H  famotidine, 20 mg, Oral, BID  furosemide, 20 mg, Oral, Daily  heparin (porcine), 5,000 Units, Subcutaneous, Q8H  insulin glargine, 45 Units, Subcutaneous, Daily  insulin regular, 4-24 Units, Subcutaneous, Q6H  Menthol-Zinc Oxide, 1 application, Topical, BID  metoprolol tartrate, 75 mg, Oral, Q12H  mupirocin, 1 application, Topical, Q12H  nystatin, , Topical, Q12H  potassium chloride, 20 mEq, Oral, Daily  Scopolamine, 1 patch, Transdermal, Q72H  sodium chloride, 30 mL/kg, Intravenous, Once  sodium chloride, 500 mL, Intravenous, Once  sodium chloride, 10 mL, Intravenous, Q12H             ASSESSMENT:   1. Acute hypoxemic respiratory failure, failed extubation and was reintubated on 5/5/2023, liberated again on 5/8/2023  2. Sepsis  3. Pneumonia, on Zosyn/VANC, positive MRSA screen  4. UTI -updated culture results is now showing Klebsiella aerogenes, Resistant to Zosyn but susceptible to cefepime, sensitive to quinolones and to aminoglycosides and to Bactrim  5. MRSA swab positive however patient has no infiltrate  6. Lactic acidosis   7. Hypokalemia   8. Mild hypernatremia  9. Diabetes with hyperglycemia, worsened by steroids  10. Dementia  11. Management of mechanical ventilation.  12. Thrombocytopenia, platelet counts in the 80s and has been stable  13. Status post PEG  14. 3 cm indeterminate  right adrenal nodule. - outpt follow up  15. History of prior stroke  16. Hypertension with tachycardia, noted most postextubation  17. New onset stridor 5/11/2022    PLAN:  Patient did improve however she seems to be slowly spiraling backward  And this is nothing that we can fix with an antibiotic, she is unable to protect her airway because of her underlying encephalopathy  My concern that the patient will probably continue to decompensate every time we get her off the ventilator unless she gets a tracheostomy tube which is discouraged because of her advanced underlying encephalopathy.  The best approach is to continue supportive measures while making the patient DNR, DNI and probably consider going to comfort measures only  CCP is already working on expediting or getting an emergency hearing to get a guardian to take over her case so that we can address her CODE STATUS  I do not see any benefit from transfer the patient to the  ICU at this point since she is still on 4 L/min, we will consider that in case she had any further decline  Long-term prognosis is very poor    Bryan Cabrera MD  05/12/23  10:17 EDT        Dictated utilizing Dragon dictation

## 2023-05-12 NOTE — NURSING NOTE
MD notified through service as no response from secure chat. Discussed with Dr. Krueger. See orders

## 2023-05-12 NOTE — CODE DOCUMENTATION
Discussed case with Dr. Cabrera with primary RN at bedside.  VSS at this time with no stridor while patient is upright.  Reported to MD that patient did exhibit stridorous breathing during turn with this RN present.  Dr. Cabrera to assess at bedside shortly.  Advised ok to end rapid at this time as patient is stable.

## 2023-05-12 NOTE — NURSING NOTE
Pt's oxygen demand has increased throughout the shift. Currently on 7L highflow cannula. MD updated via secure chat

## 2023-05-12 NOTE — NURSING NOTE
Nursing staff performing incontinence care, while lying flat pt became stridorous,diaphoretic, hr elevated 130-150s, tachypneic with accessories muscles use,  dr. garcia notified, awaiting a return call, rapid response initiated

## 2023-05-13 ENCOUNTER — APPOINTMENT (OUTPATIENT)
Dept: GENERAL RADIOLOGY | Facility: HOSPITAL | Age: 68
End: 2023-05-13
Payer: MEDICARE

## 2023-05-13 LAB
DEPRECATED RDW RBC AUTO: 56 FL (ref 37–54)
ERYTHROCYTE [DISTWIDTH] IN BLOOD BY AUTOMATED COUNT: 15.2 % (ref 12.3–15.4)
GLUCOSE BLDC GLUCOMTR-MCNC: 137 MG/DL (ref 70–130)
GLUCOSE BLDC GLUCOMTR-MCNC: 172 MG/DL (ref 70–130)
GLUCOSE BLDC GLUCOMTR-MCNC: 174 MG/DL (ref 70–130)
GLUCOSE BLDC GLUCOMTR-MCNC: 179 MG/DL (ref 70–130)
HCT VFR BLD AUTO: 35.6 % (ref 34–46.6)
HGB BLD-MCNC: 12.2 G/DL (ref 12–15.9)
MCH RBC QN AUTO: 34.1 PG (ref 26.6–33)
MCHC RBC AUTO-ENTMCNC: 34.3 G/DL (ref 31.5–35.7)
MCV RBC AUTO: 99.4 FL (ref 79–97)
PLATELET # BLD AUTO: 226 10*3/MM3 (ref 140–450)
PMV BLD AUTO: 12.5 FL (ref 6–12)
PROCALCITONIN SERPL-MCNC: 0.22 NG/ML (ref 0–0.25)
RBC # BLD AUTO: 3.58 10*6/MM3 (ref 3.77–5.28)
WBC NRBC COR # BLD: 11.68 10*3/MM3 (ref 3.4–10.8)

## 2023-05-13 PROCEDURE — 94799 UNLISTED PULMONARY SVC/PX: CPT

## 2023-05-13 PROCEDURE — 63710000001 INSULIN REGULAR HUMAN PER 5 UNITS: Performed by: INTERNAL MEDICINE

## 2023-05-13 PROCEDURE — 94760 N-INVAS EAR/PLS OXIMETRY 1: CPT

## 2023-05-13 PROCEDURE — 94664 DEMO&/EVAL PT USE INHALER: CPT

## 2023-05-13 PROCEDURE — 85027 COMPLETE CBC AUTOMATED: CPT | Performed by: INTERNAL MEDICINE

## 2023-05-13 PROCEDURE — 25010000002 HEPARIN (PORCINE) PER 1000 UNITS: Performed by: INTERNAL MEDICINE

## 2023-05-13 PROCEDURE — 71045 X-RAY EXAM CHEST 1 VIEW: CPT

## 2023-05-13 PROCEDURE — 82948 REAGENT STRIP/BLOOD GLUCOSE: CPT

## 2023-05-13 PROCEDURE — 94668 MNPJ CHEST WALL SBSQ: CPT

## 2023-05-13 PROCEDURE — 94667 MNPJ CHEST WALL 1ST: CPT

## 2023-05-13 PROCEDURE — 84145 PROCALCITONIN (PCT): CPT | Performed by: INTERNAL MEDICINE

## 2023-05-13 RX ADMIN — FAMOTIDINE 20 MG: 20 TABLET ORAL at 21:18

## 2023-05-13 RX ADMIN — CASTOR OIL AND BALSAM, PERU 1 APPLICATION: 788; 87 OINTMENT TOPICAL at 21:19

## 2023-05-13 RX ADMIN — HEPARIN SODIUM 5000 UNITS: 5000 INJECTION INTRAVENOUS; SUBCUTANEOUS at 14:54

## 2023-05-13 RX ADMIN — INSULIN GLARGINE-YFGN 45 UNITS: 100 INJECTION, SOLUTION SUBCUTANEOUS at 09:44

## 2023-05-13 RX ADMIN — POTASSIUM CHLORIDE 20 MEQ: 1.5 POWDER, FOR SOLUTION ORAL at 09:43

## 2023-05-13 RX ADMIN — CHLORHEXIDINE GLUCONATE 0.12% ORAL RINSE 15 ML: 1.2 LIQUID ORAL at 12:33

## 2023-05-13 RX ADMIN — CASTOR OIL AND BALSAM, PERU 1 APPLICATION: 788; 87 OINTMENT TOPICAL at 09:45

## 2023-05-13 RX ADMIN — MUPIROCIN 1 APPLICATION: 20 OINTMENT TOPICAL at 09:45

## 2023-05-13 RX ADMIN — ANORECTAL OINTMENT 1 APPLICATION: 15.7; .44; 24; 20.6 OINTMENT TOPICAL at 21:19

## 2023-05-13 RX ADMIN — Medication 10 ML: at 09:44

## 2023-05-13 RX ADMIN — IPRATROPIUM BROMIDE AND ALBUTEROL SULFATE 3 ML: 2.5; .5 SOLUTION RESPIRATORY (INHALATION) at 19:51

## 2023-05-13 RX ADMIN — IPRATROPIUM BROMIDE AND ALBUTEROL SULFATE 3 ML: 2.5; .5 SOLUTION RESPIRATORY (INHALATION) at 08:07

## 2023-05-13 RX ADMIN — NYSTATIN 1 APPLICATION: 100000 POWDER TOPICAL at 21:20

## 2023-05-13 RX ADMIN — Medication 10 ML: at 21:20

## 2023-05-13 RX ADMIN — ANORECTAL OINTMENT 1 APPLICATION: 15.7; .44; 24; 20.6 OINTMENT TOPICAL at 09:45

## 2023-05-13 RX ADMIN — NYSTATIN: 100000 POWDER TOPICAL at 09:45

## 2023-05-13 RX ADMIN — IPRATROPIUM BROMIDE AND ALBUTEROL SULFATE 3 ML: 2.5; .5 SOLUTION RESPIRATORY (INHALATION) at 11:24

## 2023-05-13 RX ADMIN — INSULIN HUMAN 4 UNITS: 100 INJECTION, SOLUTION PARENTERAL at 12:24

## 2023-05-13 RX ADMIN — ACETYLCYSTEINE 3 ML: 200 SOLUTION ORAL; RESPIRATORY (INHALATION) at 19:51

## 2023-05-13 RX ADMIN — FUROSEMIDE 20 MG: 20 TABLET ORAL at 09:44

## 2023-05-13 RX ADMIN — SCOPALAMINE 1 PATCH: 1 PATCH, EXTENDED RELEASE TRANSDERMAL at 12:23

## 2023-05-13 RX ADMIN — HEPARIN SODIUM 5000 UNITS: 5000 INJECTION INTRAVENOUS; SUBCUTANEOUS at 21:19

## 2023-05-13 RX ADMIN — ACETYLCYSTEINE 3 ML: 200 SOLUTION ORAL; RESPIRATORY (INHALATION) at 08:07

## 2023-05-13 RX ADMIN — AMLODIPINE BESYLATE 10 MG: 10 TABLET ORAL at 09:43

## 2023-05-13 RX ADMIN — MUPIROCIN 1 APPLICATION: 20 OINTMENT TOPICAL at 21:20

## 2023-05-13 RX ADMIN — METOPROLOL TARTRATE 75 MG: 25 TABLET, FILM COATED ORAL at 09:44

## 2023-05-13 RX ADMIN — INSULIN HUMAN 4 UNITS: 100 INJECTION, SOLUTION PARENTERAL at 00:31

## 2023-05-13 RX ADMIN — HEPARIN SODIUM 5000 UNITS: 5000 INJECTION INTRAVENOUS; SUBCUTANEOUS at 05:45

## 2023-05-13 RX ADMIN — INSULIN HUMAN 4 UNITS: 100 INJECTION, SOLUTION PARENTERAL at 06:10

## 2023-05-13 RX ADMIN — FAMOTIDINE 20 MG: 20 TABLET ORAL at 09:43

## 2023-05-13 RX ADMIN — IPRATROPIUM BROMIDE AND ALBUTEROL SULFATE 3 ML: 2.5; .5 SOLUTION RESPIRATORY (INHALATION) at 15:27

## 2023-05-13 RX ADMIN — CASTOR OIL AND BALSAM, PERU 1 APPLICATION: 788; 87 OINTMENT TOPICAL at 09:43

## 2023-05-13 NOTE — PLAN OF CARE
Goal Outcome Evaluation:  Plan of Care Reviewed With: patient        Progress: improving  Outcome Evaluation: Patient awake, nonverbal, unable to assess orientation. Patient moans intermittently. Patient respirations become labored with abdominal muscle/accessory muscle use at times, especially with repositioning/bathing/incontinence care. Respiratory has been weaning oxygen per Pulmonary orders. Tube feeds at goal through peg tube. Wound care completed. Will continue to monitor.

## 2023-05-13 NOTE — CONSULTS
Internal Medicine Consult  INTERNAL MEDICINE   [facility]       Patient Identification:  Name: Shama Cruz  Age: 68 y.o.  Sex: female  :  1955  MRN: [id number]                   Primary Care Physician: Archie Murray MD                               Requesting physician: Dr. Santos  Date of consultation: 2023    Reason for consultation: Management of medical issues after being transferred out of ICU.    History of Present Illness:   Source of information review of records as patient is aphasic due to stroke with residual right-sided weakness and dysphagia and currently on feeding via G-tube.  Patient is a 68-year-old female who was admitted to ICU on 2023 when she was transferred from a facility for high fever and respiratory distress requiring onsite intubation.  Patient was admitted with diagnosis of acute hypoxemic respiratory failure with sepsis due to pneumonia versus UTI in the setting of known history of dementia and history of diabetes as well as prior stroke with aphasia dysphagia and right-sided weakness with immobility.  Patient remained on ventilator off and on through 2023 and was eventually transferred out of ICU on 2023.  On 2023 patient has hard time handling her secretions and has had rapid response called while she was on the floor with improvement in her condition with suction and positioning.  Patient's CODE STATUS and power of  issues are being worked out as patient does not have decision maker and she is nonverbal.  Pulmonary service managing patient on the floor after being transferred out of ICU are very concerned about her recurrent respiratory decompensation due to malhanding of her respiratory secretions and plan for potential reintubation and may require long-term tracheostomy with vent dependency.  Earlier this morning patient developed hypoxia again requiring increased oxygen supplementation which improved after percussive  therapy.  This resulted in internal medicine consultation for continued care out of the ICU.  During her stay patient was noted to have Klebsiella aerogenes urinary tract infection as well as MRSA screen positive.  Patient has been treated initially with vancomycin and Zosyn and later switched to Levaquin based on the sensitivity of the Enterobacter arginase and completed 5-day course of Levaquin on 5/11/2023 and vancomycin on 5/10/2023.  Repeat ABG on the morning of 5/12/2023 showed normal pH and PO2 of 99.9 on 2 L nasal cannula.  Patient is currently off antibiotic therapy.  Past Medical History:  Past Medical History:   Diagnosis Date   • Cerebral infarction    • Chronic respiratory failure    • Dementia    • Depression    • Diabetes mellitus    • Hypertension      Past Surgical History:  History reviewed. No pertinent surgical history.   Home Meds:  Medications Prior to Admission   Medication Sig Dispense Refill Last Dose   • acetaminophen (TYLENOL) 325 MG tablet Administer 2 tablets per G tube Every 6 (Six) Hours As Needed for Mild Pain.      • amLODIPine (NORVASC) 10 MG tablet Administer 1 tablet per G tube Daily.      • ascorbic acid (VITAMIN C) 500 MG tablet Administer 1 tablet per G tube 2 (Two) Times a Day.      • aspirin 81 MG chewable tablet Administer 1 tablet per G tube Daily.      • atorvastatin (LIPITOR) 80 MG tablet Administer 1 tablet per G tube Daily.      • Cholecalciferol liquid Administer 800 Units per G tube Daily.      • donepezil (ARICEPT) 5 MG tablet Take 1 tablet by mouth Every Night.      • insulin glargine (LANTUS, SEMGLEE) 100 UNIT/ML injection Inject 15 Units under the skin into the appropriate area as directed Every Night.      • lactulose (CHRONULAC) 10 GM/15ML solution Administer 30 mL per G tube 2 (Two) Times a Day As Needed. For high ammonia levels, hold for more than 2 loose stools      • metFORMIN (GLUCOPHAGE) 500 MG tablet Administer 1 tablet per G tube 2 (Two) Times a Day With  Meals.      • metoprolol tartrate (LOPRESSOR) 25 MG tablet Administer 12.5 mg per G tube 2 (Two) Times a Day.      • Multiple Vitamins-Minerals (Multivitamin Adult, Minerals,) tablet Administer 1 tablet per G tube Daily.      • neomycin-bacitracin-polymyxin (NEOSPORIN) 5-400-5000 ointment Apply 1 application topically to the appropriate area as directed 2 (Two) Times a Day. To back of rt ear      • Omega-3 Fatty Acids (fish oil) 1000 MG capsule capsule Take 2 capsules by mouth 2 (Two) Times a Day.      • potassium chloride ER (K-TAB) 20 MEQ tablet controlled-release ER tablet 1 tablet Daily.      • sertraline (ZOLOFT) 25 MG tablet Administer 3 tablets per G tube Daily. Takes 25mg + 50mg for total 75mg daily      • traMADol (ULTRAM) 50 MG tablet Administer 1 tablet per G tube 2 (Two) Times a Day.      • Wound Dressings (MEDIHONEY WOUND/BURN DRESSING EX) Apply  topically 2 (Two) Times a Day. To bilateral buttocks wound      • zinc oxide 20 % ointment Apply 1 application topically to the appropriate area as directed 2 (Two) Times a Day. To left inner ankle      • zinc sulfate (ZINCATE) 220 (50 Zn) MG capsule Administer 1 capsule per G tube Daily.        Current Meds:     Current Facility-Administered Medications:   •  acetaminophen (TYLENOL) tablet 1,000 mg, 1,000 mg, Oral, Once, Bryan Cabrera MD  •  acetylcysteine (MUCOMYST) 20 % nebulizer solution 3 mL, 3 mL, Nebulization, BID - RT, Lucille Lopez, APRN, 3 mL at 05/13/23 0807  •  amLODIPine (NORVASC) tablet 10 mg, 10 mg, Per G Tube, Daily, Bryan Cabrera MD, 10 mg at 05/13/23 0943  •  castor oil-balsam peru (VENELEX) ointment 1 application, 1 application, Topical, Q12H, Bryan Cabrera MD, 1 application at 05/13/23 0945  •  castor oil-balsam peru (VENELEX) ointment 1 application, 1 application, Topical, Q12H, Bryan Cabrera MD, 1 application at 05/13/23 0943  •  chlorhexidine (PERIDEX) 0.12 % solution 15 mL, 15 mL, Mouth/Throat, Q12H, Bryan Cabrera MD, 15 mL  at 05/13/23 1233  •  dextrose (D50W) (25 g/50 mL) IV injection 25 g, 25 g, Intravenous, Q15 Min PRN, Bryan Cabrera MD  •  dextrose (GLUTOSE) oral gel 15 g, 15 g, Oral, Q15 Min PRN, Bryan Cabrera MD  •  famotidine (PEPCID) tablet 20 mg, 20 mg, Oral, BID, Bryan Cabrera MD, 20 mg at 05/13/23 0943  •  furosemide (LASIX) tablet 20 mg, 20 mg, Oral, Daily, Bryan Cabrera MD, 20 mg at 05/13/23 0944  •  glucagon (GLUCAGEN) injection 1 mg, 1 mg, Intramuscular, Q15 Min PRN, Bryan Cabrera MD  •  guaifenesin (ROBITUSSIN) 100 MG/5ML liquid 200 mg, 200 mg, Oral, Q4H PRN, Lucille Lopez, APRN  •  heparin (porcine) 5000 UNIT/ML injection 5,000 Units, 5,000 Units, Subcutaneous, Q8H, Bryan Cabrera MD, 5,000 Units at 05/13/23 1454  •  insulin glargine (LANTUS, SEMGLEE) injection 45 Units, 45 Units, Subcutaneous, Daily, Bryan Cabrera MD, 45 Units at 05/13/23 0944  •  insulin regular (humuLIN R,novoLIN R) injection 4-24 Units, 4-24 Units, Subcutaneous, Q6H, Bryan Cabrera MD, 4 Units at 05/13/23 1224  •  ipratropium-albuterol (DUO-NEB) nebulizer solution 3 mL, 3 mL, Nebulization, 4x Daily - RT, Lucille Lopez, APRN, 3 mL at 05/13/23 1527  •  labetalol (NORMODYNE,TRANDATE) injection 20 mg, 20 mg, Intravenous, Q4H PRN, Bryan Cabrera MD  •  loperamide (IMODIUM A-D) 1 MG/7.5ML suspension 2 mg, 2 mg, Oral, 4x Daily PRN, Bryan Cabrera MD, 2 mg at 05/11/23 2039  •  Menthol-Zinc Oxide 1 application, 1 application, Topical, BID, Bryan Cabrera MD, 1 application at 05/13/23 0945  •  metoprolol tartrate (LOPRESSOR) tablet 75 mg, 75 mg, Oral, Q12H, Bryan Cabrera MD, 75 mg at 05/13/23 0944  •  mupirocin (BACTROBAN) 2 % ointment 1 application, 1 application, Topical, Q12H, Bryan Cabrera MD, 1 application at 05/13/23 0945  •  nystatin (MYCOSTATIN) powder, , Topical, Q12H, Bryan Cabrera MD, Given at 05/13/23 0945  •  potassium chloride (K-DUR,KLOR-CON) ER tablet 40 mEq, 40 mEq, Oral, PRN **OR** potassium chloride (KLOR-CON)  "packet 40 mEq, 40 mEq, Oral, PRN, 40 mEq at 05/09/23 1304 **OR** potassium chloride 10 mEq in 100 mL IVPB, 10 mEq, Intravenous, Q1H PRN, Bryan Cabrera MD  •  potassium chloride (KLOR-CON) packet 20 mEq, 20 mEq, Oral, Daily, Bryan Cabrera MD, 20 mEq at 05/13/23 0943  •  scopolamine patch 1 mg/72 hr, 1 patch, Transdermal, Q72H, Bryan Cabrera MD, 1 patch at 05/13/23 1223  •  sodium chloride 0.9 % bolus 2,721 mL, 30 mL/kg, Intravenous, Once, Bryan Cabrera MD, Stopped at 05/04/23 0842  •  sodium chloride 0.9 % bolus 500 mL, 500 mL, Intravenous, Once, Bryan Cabrera MD, Last Rate: 250 mL/hr at 05/04/23 0725, Currently Infusing at 05/04/23 0725  •  sodium chloride 0.9 % flush 10 mL, 10 mL, Intravenous, Q12H, Bryan Cabrera MD, 10 mL at 05/13/23 0944  •  sodium chloride 0.9 % flush 10 mL, 10 mL, Intravenous, PRN, Bryan Cabrera MD  •  sodium chloride 0.9 % infusion 40 mL, 40 mL, Intravenous, PRVICTORNIO, Bryan Cabrera MD  Allergies:  No Known Allergies  Social History:   Social History     Tobacco Use   • Smoking status: Never     Passive exposure: Never (FRED; ETT)   • Smokeless tobacco: Never   Substance Use Topics   • Alcohol use: Defer      Family History:  History reviewed. No pertinent family history.       Review of Systems  See history of present illness and past medical history.   Could not be obtained.    Vitals:   /60 (BP Location: Right arm, Patient Position: Lying)   Pulse 89   Temp 97.9 °F (36.6 °C) (Oral)   Resp 18   Ht 162.6 cm (64.02\")   Wt 63.7 kg (140 lb 6.9 oz)   SpO2 96%   BMI 24.09 kg/m²   I/O:     Intake/Output Summary (Last 24 hours) at 5/13/2023 1916  Last data filed at 5/13/2023 1400  Gross per 24 hour   Intake 1493 ml   Output 725 ml   Net 768 ml     Exam:  Patient is examined using the personal protective equipment as per guidelines from infection control for this particular patient as enacted.  Hand washing was performed before and after patient interaction.  General Appearance: "   Awake nonverbal but seems to have receptive ability but does have expressive aphasia.   Head:    Normocephalic, without obvious abnormality, atraumatic   Eyes:   Pale conjunctival   Ears:    Normal external ear canals, both ears   Nose:   Nares normal, septum midline, mucosa normal, no drainage    or sinus tenderness   Throat:   Lips, tongue, gums normal; oral mucosa pink and moist   Neck:   Supple, symmetrical, trachea midline, no adenopathy;     thyroid:  no enlargement/tenderness/nodules; no carotid    bruit or JVD   Back:     Symmetric, no curvature, ROM normal, no CVA tenderness   Lungs:    Decreased breath sounds at the bases but no obvious use of accessory muscles of breathing noted   Chest Wall:    No tenderness or deformity    Heart:   S1-S2 tachycardiac   Abdomen:    Feeding tube in place   Extremities:   Extremities normal, atraumatic, no cyanosis or edema   Pulses:   Pulses palpable in all extremities; symmetric all extremities   Skin:   Skin color normal, Skin is warm and dry,  no rashes or palpable lesions   Neurologic:  Nonverbal but seems to interact but cannot engage and has right-sided plegia due to prior stroke.       Data Review:      I reviewed the patient's new clinical results.  Results from last 7 days   Lab Units 05/13/23  0525 05/12/23  0636 05/10/23  0352 05/09/23  0703 05/08/23  0328 05/07/23  1138   WBC 10*3/mm3 11.68* 9.38 11.81* 13.34* 8.35 10.98*   HEMOGLOBIN g/dL 12.2 12.4 12.2 11.2* 9.1* 9.2*   PLATELETS 10*3/mm3 226 247 222 201 137* 113*     Results from last 7 days   Lab Units 05/12/23  0636 05/10/23  0352 05/09/23  2045 05/09/23  0703 05/08/23  0328 05/07/23  0530 05/06/23  2326   SODIUM mmol/L 136 133*  --  135* 138  --   --    POTASSIUM mmol/L 4.9 4.2 4.6 3.0* 3.9  --  4.6   CHLORIDE mmol/L 97* 97*  --  97* 105  --   --    CO2 mmol/L 22.8 21.3*  --  23.0 24.0  --   --    BUN mg/dL 45* 32*  --  31* 26*  --   --    CREATININE mg/dL 0.75 0.52*  --  0.52* 0.57 0.50*  --     CALCIUM mg/dL 9.8 9.3  --  8.3* 8.8  --   --    GLUCOSE mg/dL 238* 183*  --  244* 285*  --   --      Microbiology Results (last 10 days)     Procedure Component Value - Date/Time    Respiratory Culture - Wash, Bronchus [[id number]] Collected: 05/07/23 1151    Lab Status: Final result Specimen: Wash from Bronchus Updated: 05/09/23 1119     Respiratory Culture No growth     Gram Stain No WBCs or organisms seen      No epithelial cells seen    Respiratory Culture - Aspirate, ET Suction [[id number]] Collected: 05/06/23 0940    Lab Status: Final result Specimen: Aspirate from ET Suction Updated: 05/08/23 0908     Respiratory Culture No growth at 2 days     Gram Stain Moderate (3+) WBCs seen      Rare (1+) Epithelial cells per low power field      Mixed bacterial morphotypes seen on Gram Stain    MRSA Screen, PCR (Inpatient) - Swab, Nares [[id number]]  (Abnormal) Collected: 05/04/23 0903    Lab Status: Final result Specimen: Swab from Nares Updated: 05/04/23 1055     MRSA PCR MRSA Detected    Narrative:      The negative predictive value of this diagnostic test is high and should only be used to consider de-escalating anti-MRSA therapy. A positive result may indicate colonization with MRSA and must be correlated clinically.    Note: when specimen was received by lab, swab had been placed upside down in collection tube.          Assessment:  Active Hospital Problems    Diagnosis  POA   • **Sepsis, due to unspecified organism, unspecified whether acute organ dysfunction present [A41.9]  Yes   Acute hypoxemic respiratory failure  Sepsis  Pneumonia  UTI  Lactic acidosis  Diabetes with worsened hyperglycemia secondary to steroids  Dementia  Status post PEG tube  3 cm indeterminate right adrenal nodule  Stridor: Resolved  History of previous stroke    Plan:  · Continue present care of nutrition via feeding tube, aspiration precaution and pulmonary toilet to handle her secretions.  · Continue to work with case management to  straighten out her medical guardian issues and CODE STATUS.  · Continue with Accu-Cheks and sliding scale coverage  · Keep an eye on her hemodynamics respiratory status and watch for complications of hospitalizations such as phlebitis recurrence of UTI aspiration pneumonias decubiti and C. difficile infection.  · Will need continued pulmonary follow-up as [facility] will assume care from 5/14/2023.  · Overall care plan discussed with patient's caring nurse.    Checo Burns MD   5/13/2023  19:16 EDT    Parts of this note may be an electronic transcription/translation of spoken language to printed text using the Dragon dictation system.

## 2023-05-13 NOTE — PLAN OF CARE
Goal Outcome Evaluation:  Plan of Care Reviewed With: patient        Progress: declining  Outcome Evaluation: Monitor pain,labs,and vitals. VSS. O2 increased to 10L high flow NC-contacted APRN about increase in O2. Patient tolerating tube feeds well. Turn q 2. Will continue to monitor.

## 2023-05-13 NOTE — PROGRESS NOTES
[facility] INPATIENT PROGRESS NOTE         [address]    2023      PATIENT IDENTIFICATION:  Name: Shama Cruz ADMIT: 2023   : 1955  PCP: Archie Murray MD    MRN: [id number] LOS: 9 days   AGE/SEX: 68 y.o. female  ROOM: [facility]                     LOS 9    Reason for visit: Respiratory failure      SUBJECTIVE:      Noted increasing oxygen requirements.  Respiratory therapy at bedside at time of visit.  Starting percussive therapy to help with worsening left lower infiltrate on chest x-ray.  Currently on 8 L supplemental oxygen with saturations of 100%.  Patient very confused.  Nonverbal does not follow commands.  I am seeing the patient for the first time today.  All patient problems are new to me.      Objective   OBJECTIVE:    Vital Sign Min/Max for last 24 hours  Temp  Min: 96.9 °F (36.1 °C)  Max: 99.1 °F (37.3 °C)   BP  Min: 103/63  Max: 135/82   Pulse  Min: 86  Max: 136   Resp  Min: 14  Max: 20   SpO2  Min: 87 %  Max: 99 %   No data recorded   Weight  Min: 63.7 kg (140 lb 6.9 oz)  Max: 63.7 kg (140 lb 6.9 oz)                   FiO2 (%): 21 %     Body mass index is 24.09 kg/m².    Intake/Output Summary (Last 24 hours) at 2023 0920  Last data filed at 2023 0615  Gross per 24 hour   Intake 1175 ml   Output 525 ml   Net 650 ml         Exam:  GEN:  No distress, appears stated age.  Confused  EYES:   PERRL, anicteric sclerae  ENT:    External ears/nose normal, OP clear  NECK:  No adenopathy, midline trachea  LUNGS: Normal chest on inspection, palpation and diminished breath sounds at bases left greater than right on auscultation  CV:  Normal S1S2, without murmur  ABD:  Nontender, nondistended, no hepatosplenomegaly, +BS  EXT:  No edema.  No cyanosis or clubbing.  No mottling and normal cap refill.    Assessment     Scheduled meds:  acetaminophen, 1,000 mg, Oral, Once  acetylcysteine, 3 mL, Nebulization, BID - RT  amLODIPine, 10 mg, Per G Tube, Daily  castor  oil-balsam peru, 1 application, Topical, Q12H  castor oil-balsam peru, 1 application, Topical, Q12H  chlorhexidine, 15 mL, Mouth/Throat, Q12H  famotidine, 20 mg, Oral, BID  furosemide, 20 mg, Oral, Daily  heparin (porcine), 5,000 Units, Subcutaneous, Q8H  insulin glargine, 45 Units, Subcutaneous, Daily  insulin regular, 4-24 Units, Subcutaneous, Q6H  ipratropium-albuterol, 3 mL, Nebulization, 4x Daily - RT  Menthol-Zinc Oxide, 1 application, Topical, BID  metoprolol tartrate, 75 mg, Oral, Q12H  mupirocin, 1 application, Topical, Q12H  nystatin, , Topical, Q12H  potassium chloride, 20 mEq, Oral, Daily  Scopolamine, 1 patch, Transdermal, Q72H  sodium chloride, 30 mL/kg, Intravenous, Once  sodium chloride, 500 mL, Intravenous, Once  sodium chloride, 10 mL, Intravenous, Q12H      IV meds:                         Data Review:  Results from last 7 days   Lab Units 05/12/23  0636 05/10/23  0352 05/09/23  2045 05/09/23  0703 05/08/23  0328 05/07/23  0530 05/06/23  2326   SODIUM mmol/L 136 133*  --  135* 138  --   --    POTASSIUM mmol/L 4.9 4.2 4.6 3.0* 3.9  --   --    CHLORIDE mmol/L 97* 97*  --  97* 105  --   --    CO2 mmol/L 22.8 21.3*  --  23.0 24.0  --   --    BUN mg/dL 45* 32*  --  31* 26*  --   --    CREATININE mg/dL 0.75 0.52*  --  0.52* 0.57 0.50*  --    GLUCOSE mg/dL 238* 183*  --  244* 285*  --    < >   CALCIUM mg/dL 9.8 9.3  --  8.3* 8.8  --   --     < > = values in this interval not displayed.         Estimated Creatinine Clearance: 73.2 mL/min (by C-G formula based on SCr of 0.75 mg/dL).  Results from last 7 days   Lab Units 05/13/23  0525 05/12/23  0636 05/10/23  0352 05/09/23  0703 05/08/23  0328   WBC 10*3/mm3 11.68* 9.38 11.81* 13.34* 8.35   HEMOGLOBIN g/dL 12.2 12.4 12.2 11.2* 9.1*   PLATELETS 10*3/mm3 226 247 222 201 137*             Results from last 7 days   Lab Units 05/12/23  0953 05/08/23  0831 05/07/23  0341   PH, ARTERIAL pH units 7.367 7.444 7.445   PO2 ART mm Hg 99.9 78.3* 81.2   PCO2, ARTERIAL  mm Hg 43.3 37.2 33.6*   HCO3 ART mmol/L 24.9 25.5 23.1     Results from last 7 days   Lab Units 05/12/23  0636   PROCALCITONIN ng/mL 0.18         Glucose   Date/Time Value Ref Range Status   05/13/2023 0559 179 (H) 70 - 130 mg/dL Final     Comment:     Meter: [id number] : [id number] Arturo Tanvir NA   05/13/2023 0020 172 (H) 70 - 130 mg/dL Final     Comment:     Meter: [id number] : [id number] Arturo Tanvir NA   05/12/2023 1810 196 (H) 70 - 130 mg/dL Final     Comment:     Meter: [id number] : [id number] Kei Gomezus NA   05/12/2023 1157 216 (H) 70 - 130 mg/dL Final     Comment:     Meter: [id number] : [id number] Choudhury Jasonus NA   05/12/2023 0941 232 (H) 70 - 130 mg/dL Final     Comment:     Meter: [id number] : [id number] Choudhury Jasonus NA   05/12/2023 0636 255 (H) 70 - 130 mg/dL Final     Comment:     Meter: [id number] : [id number] Marisol Aiken NA   05/12/2023 0052 317 (H) 70 - 130 mg/dL Final     Comment:     Meter: [id number] : [id number] Marisol Engly NA       Chest x-ray 5/13 shows increasing left basilar infiltrate      Microbiology reviewed              Active Hospital Problems    Diagnosis  POA   • **Sepsis, due to unspecified organism, unspecified whether acute organ dysfunction present [A41.9]  Yes      Resolved Hospital Problems   No resolved problems to display.         ASSESSMENT:    Acute hypoxemic respiratory failure  Sepsis  Pneumonia  UTI  Lactic acidosis  Diabetes with worsened hyperglycemia secondary to steroids  Dementia  Status post PEG tube  3 cm indeterminate right adrenal nodule  Stridor: Resolved  History of previous stroke          PLAN:    Discussed with respiratory therapy. Continue aggressive clearance measures. Adding percussive therapy to help with worsening left lower infiltrate on chest x-ray.  Status post antibiotic course.  This may be predominantly atelectasis on today's x-ray.  Recheck procalcitonin level.  If showing significant increase in  level will resume antibiotic.  Keep head of the bed elevated for aspiration risk.  Wean oxygen as able.  Consult hospitalist service to help with medical management outside of intensive care.        Mega Malloy MD  Pulmonary and Critical Care Medicine  [facility], [facility]  5/13/2023    09:20 EDT

## 2023-05-14 PROBLEM — E11.65 TYPE 2 DIABETES MELLITUS WITH HYPERGLYCEMIA: Status: ACTIVE | Noted: 2023-05-14

## 2023-05-14 PROBLEM — T83.511A UTI (URINARY TRACT INFECTION) DUE TO URINARY INDWELLING CATHETER: Status: ACTIVE | Noted: 2023-05-14

## 2023-05-14 PROBLEM — Z86.73 HISTORY OF ISCHEMIC STROKE: Status: ACTIVE | Noted: 2023-05-14

## 2023-05-14 PROBLEM — J18.9 PNEUMONIA: Status: ACTIVE | Noted: 2023-05-14

## 2023-05-14 PROBLEM — N39.0 UTI (URINARY TRACT INFECTION) DUE TO URINARY INDWELLING CATHETER: Status: ACTIVE | Noted: 2023-05-14

## 2023-05-14 PROBLEM — Z93.1 PEG (PERCUTANEOUS ENDOSCOPIC GASTROSTOMY) STATUS: Status: ACTIVE | Noted: 2023-05-14

## 2023-05-14 PROBLEM — F03.90 DEMENTIA WITHOUT BEHAVIORAL DISTURBANCE: Status: ACTIVE | Noted: 2023-05-14

## 2023-05-14 PROBLEM — E27.8 ADRENAL NODULE: Status: ACTIVE | Noted: 2023-05-14

## 2023-05-14 PROBLEM — F05 DELIRIUM DUE TO ANOTHER MEDICAL CONDITION: Status: ACTIVE | Noted: 2023-05-14

## 2023-05-14 PROBLEM — E87.20 LACTIC ACIDOSIS: Status: ACTIVE | Noted: 2023-05-14

## 2023-05-14 PROBLEM — G93.41 METABOLIC ENCEPHALOPATHY: Status: ACTIVE | Noted: 2023-05-14

## 2023-05-14 LAB
GLUCOSE BLDC GLUCOMTR-MCNC: 142 MG/DL (ref 70–130)
GLUCOSE BLDC GLUCOMTR-MCNC: 157 MG/DL (ref 70–130)
GLUCOSE BLDC GLUCOMTR-MCNC: 164 MG/DL (ref 70–130)
GLUCOSE BLDC GLUCOMTR-MCNC: 167 MG/DL (ref 70–130)
GLUCOSE BLDC GLUCOMTR-MCNC: 188 MG/DL (ref 70–130)

## 2023-05-14 PROCEDURE — 94799 UNLISTED PULMONARY SVC/PX: CPT

## 2023-05-14 PROCEDURE — 94760 N-INVAS EAR/PLS OXIMETRY 1: CPT

## 2023-05-14 PROCEDURE — 25010000002 HEPARIN (PORCINE) PER 1000 UNITS: Performed by: INTERNAL MEDICINE

## 2023-05-14 PROCEDURE — 82948 REAGENT STRIP/BLOOD GLUCOSE: CPT

## 2023-05-14 PROCEDURE — 63710000001 INSULIN REGULAR HUMAN PER 5 UNITS: Performed by: INTERNAL MEDICINE

## 2023-05-14 PROCEDURE — 94668 MNPJ CHEST WALL SBSQ: CPT

## 2023-05-14 PROCEDURE — 94664 DEMO&/EVAL PT USE INHALER: CPT

## 2023-05-14 RX ADMIN — INSULIN HUMAN 4 UNITS: 100 INJECTION, SOLUTION PARENTERAL at 13:07

## 2023-05-14 RX ADMIN — IPRATROPIUM BROMIDE AND ALBUTEROL SULFATE 3 ML: 2.5; .5 SOLUTION RESPIRATORY (INHALATION) at 08:07

## 2023-05-14 RX ADMIN — INSULIN HUMAN 4 UNITS: 100 INJECTION, SOLUTION PARENTERAL at 17:08

## 2023-05-14 RX ADMIN — IPRATROPIUM BROMIDE AND ALBUTEROL SULFATE 3 ML: 2.5; .5 SOLUTION RESPIRATORY (INHALATION) at 15:05

## 2023-05-14 RX ADMIN — CASTOR OIL AND BALSAM, PERU 1 APPLICATION: 788; 87 OINTMENT TOPICAL at 21:54

## 2023-05-14 RX ADMIN — POTASSIUM CHLORIDE 20 MEQ: 1.5 POWDER, FOR SOLUTION ORAL at 10:43

## 2023-05-14 RX ADMIN — FUROSEMIDE 20 MG: 20 TABLET ORAL at 10:42

## 2023-05-14 RX ADMIN — METOPROLOL TARTRATE 75 MG: 25 TABLET, FILM COATED ORAL at 10:42

## 2023-05-14 RX ADMIN — NYSTATIN 1 APPLICATION: 100000 POWDER TOPICAL at 21:55

## 2023-05-14 RX ADMIN — FAMOTIDINE 20 MG: 20 TABLET ORAL at 10:42

## 2023-05-14 RX ADMIN — Medication 10 ML: at 21:56

## 2023-05-14 RX ADMIN — IPRATROPIUM BROMIDE AND ALBUTEROL SULFATE 3 ML: 2.5; .5 SOLUTION RESPIRATORY (INHALATION) at 10:49

## 2023-05-14 RX ADMIN — INSULIN HUMAN 4 UNITS: 100 INJECTION, SOLUTION PARENTERAL at 06:16

## 2023-05-14 RX ADMIN — ACETYLCYSTEINE 3 ML: 200 SOLUTION ORAL; RESPIRATORY (INHALATION) at 08:06

## 2023-05-14 RX ADMIN — AMLODIPINE BESYLATE 10 MG: 10 TABLET ORAL at 10:42

## 2023-05-14 RX ADMIN — CASTOR OIL AND BALSAM, PERU 1 APPLICATION: 788; 87 OINTMENT TOPICAL at 10:41

## 2023-05-14 RX ADMIN — FAMOTIDINE 20 MG: 20 TABLET ORAL at 23:31

## 2023-05-14 RX ADMIN — CASTOR OIL AND BALSAM, PERU 1 APPLICATION: 788; 87 OINTMENT TOPICAL at 10:43

## 2023-05-14 RX ADMIN — ACETYLCYSTEINE 3 ML: 200 SOLUTION ORAL; RESPIRATORY (INHALATION) at 20:37

## 2023-05-14 RX ADMIN — HEPARIN SODIUM 5000 UNITS: 5000 INJECTION INTRAVENOUS; SUBCUTANEOUS at 06:15

## 2023-05-14 RX ADMIN — INSULIN GLARGINE-YFGN 45 UNITS: 100 INJECTION, SOLUTION SUBCUTANEOUS at 10:41

## 2023-05-14 RX ADMIN — Medication 10 ML: at 10:42

## 2023-05-14 RX ADMIN — MUPIROCIN 1 APPLICATION: 20 OINTMENT TOPICAL at 21:54

## 2023-05-14 RX ADMIN — METOPROLOL TARTRATE 75 MG: 25 TABLET, FILM COATED ORAL at 23:31

## 2023-05-14 RX ADMIN — HEPARIN SODIUM 5000 UNITS: 5000 INJECTION INTRAVENOUS; SUBCUTANEOUS at 13:06

## 2023-05-14 RX ADMIN — NYSTATIN: 100000 POWDER TOPICAL at 10:41

## 2023-05-14 RX ADMIN — ANORECTAL OINTMENT 1 APPLICATION: 15.7; .44; 24; 20.6 OINTMENT TOPICAL at 21:55

## 2023-05-14 RX ADMIN — IPRATROPIUM BROMIDE AND ALBUTEROL SULFATE 3 ML: 2.5; .5 SOLUTION RESPIRATORY (INHALATION) at 20:36

## 2023-05-14 RX ADMIN — CHLORHEXIDINE GLUCONATE 0.12% ORAL RINSE 15 ML: 1.2 LIQUID ORAL at 21:54

## 2023-05-14 RX ADMIN — MUPIROCIN 1 APPLICATION: 20 OINTMENT TOPICAL at 10:41

## 2023-05-14 RX ADMIN — ANORECTAL OINTMENT 1 APPLICATION: 15.7; .44; 24; 20.6 OINTMENT TOPICAL at 10:41

## 2023-05-14 RX ADMIN — HEPARIN SODIUM 5000 UNITS: 5000 INJECTION INTRAVENOUS; SUBCUTANEOUS at 23:32

## 2023-05-14 RX ADMIN — CHLORHEXIDINE GLUCONATE 0.12% ORAL RINSE 15 ML: 1.2 LIQUID ORAL at 13:06

## 2023-05-14 NOTE — PLAN OF CARE
"Goal Outcome Evaluation:  Plan of Care Reviewed With: patient        Progress: no change  Outcome Evaluation: Patient more alert today; moaning more and has responded with some head nodding and words \"hello\" \"no\" and \"yea\" today. Patient was down to 2-3L n/c, however, this afternoon, oxygen sats started dropping again and patient had to be put back on high flow to maintain sats. Patient turned q2h. Tube feeding continues via peg. Will continue to monitor.         "

## 2023-05-14 NOTE — PLAN OF CARE
Goal Outcome Evaluation:  Plan of Care Reviewed With: patient        Progress: improving  Outcome Evaluation: Monitor pain,labs,and vitals. VSS. RT weaned patient to 3L O2 NC. Patient tolerating tube feeds well. Turn q 2. Will continue to monitor.

## 2023-05-14 NOTE — PROGRESS NOTES
[facility]    [phone]   LOS: 10 days     Name: Shama Cruz  Age/Sex: 68 y.o. female  :  1955        PCP: Archie Murray MD  Chief Complaint   Patient presents with   • Respiratory Distress      Subjective   Rested well overnight.  Remains confused and lethargic.    acetaminophen, 1,000 mg, Oral, Once  acetylcysteine, 3 mL, Nebulization, BID - RT  amLODIPine, 10 mg, Per G Tube, Daily  castor oil-balsam peru, 1 application, Topical, Q12H  castor oil-balsam peru, 1 application, Topical, Q12H  chlorhexidine, 15 mL, Mouth/Throat, Q12H  famotidine, 20 mg, Oral, BID  furosemide, 20 mg, Oral, Daily  heparin (porcine), 5,000 Units, Subcutaneous, Q8H  insulin glargine, 45 Units, Subcutaneous, Daily  insulin regular, 4-24 Units, Subcutaneous, Q6H  ipratropium-albuterol, 3 mL, Nebulization, 4x Daily - RT  Menthol-Zinc Oxide, 1 application, Topical, BID  metoprolol tartrate, 75 mg, Oral, Q12H  mupirocin, 1 application, Topical, Q12H  nystatin, , Topical, Q12H  potassium chloride, 20 mEq, Oral, Daily  Scopolamine, 1 patch, Transdermal, Q72H  sodium chloride, 30 mL/kg, Intravenous, Once  sodium chloride, 500 mL, Intravenous, Once  sodium chloride, 10 mL, Intravenous, Q12H           Objective   Vital Signs  Temp:  [93.4 °F (34.1 °C)-99.2 °F (37.3 °C)] 97.2 °F (36.2 °C)  Heart Rate:  [] 102  Resp:  [18] 18  BP: (102-120)/(57-76) 120/76  Body mass index is 24.17 kg/m².    Intake/Output Summary (Last 24 hours) at 2023 0942  Last data filed at 2023 0556  Gross per 24 hour   Intake 1463 ml   Output 600 ml   Net 863 ml       Physical Exam  Vitals and nursing note reviewed.   Constitutional:       Appearance: Normal appearance. She is not ill-appearing.   HENT:      Head: Normocephalic and atraumatic.   Cardiovascular:      Rate and Rhythm: Normal rate and regular rhythm.   Pulmonary:      Effort: Pulmonary effort is normal. No respiratory distress.      Breath sounds: Stridor  present.      Comments: Noted some inspiratory stridor  Abdominal:      General: Bowel sounds are normal. There is distension.      Palpations: Abdomen is soft.      Comments: Noted bruising over the lower abdomen PEG in good position no erythema or signs of irritation or infection   Neurological:      Mental Status: She is alert. She is disoriented.      Comments: Lethargic minimally responsive           Results Review:       I reviewed the patient's new clinical results.  Results from last 7 days   Lab Units 05/13/23  0525 05/12/23  0636 05/10/23  0352 05/09/23  0703 05/08/23  0328 05/07/23  1138   WBC 10*3/mm3 11.68* 9.38 11.81* 13.34* 8.35 10.98*   HEMOGLOBIN g/dL 12.2 12.4 12.2 11.2* 9.1* 9.2*   PLATELETS 10*3/mm3 226 247 222 201 137* 113*     Results from last 7 days   Lab Units 05/12/23  0636 05/10/23  0352 05/09/23  2045 05/09/23  0703 05/08/23  0328   SODIUM mmol/L 136 133*  --  135* 138   POTASSIUM mmol/L 4.9 4.2 4.6 3.0* 3.9   CHLORIDE mmol/L 97* 97*  --  97* 105   CO2 mmol/L 22.8 21.3*  --  23.0 24.0   BUN mg/dL 45* 32*  --  31* 26*   CREATININE mg/dL 0.75 0.52*  --  0.52* 0.57   CALCIUM mg/dL 9.8 9.3  --  8.3* 8.8   MAGNESIUM mg/dL 2.3  --   --  2.0  --    PHOSPHORUS mg/dL 4.8*  --   --   --   --    Estimated Creatinine Clearance: 73.4 mL/min (by C-G formula based on SCr of 0.75 mg/dL).    No results found for: HGBA1C  Glucose   Date/Time Value Ref Range Status   05/14/2023 1227 188 (H) 70 - 130 mg/dL Final     Comment:     Meter: [id number] : [id number] Foreign STRONG   05/14/2023 0554 157 (H) 70 - 130 mg/dL Final     Comment:     Meter: [id number] : [id number] Jamshid STRONG   05/14/2023 0055 142 (H) 70 - 130 mg/dL Final     Comment:     Meter: [id number] : [id number] Jamshid Ray    05/13/2023 1511 137 (H) 70 - 130 mg/dL Final     Comment:     Meter: [id number] : [id number] Foreign Hunt    05/13/2023 1110 174 (H) 70 - 130 mg/dL Final     Comment:     Meter: [id number] :  [id number] Foreign Hunt NA   05/13/2023 0559 179 (H) 70 - 130 mg/dL Final     Comment:     Meter: [id number] : [id number] Arturo Ramey NA   05/13/2023 0020 172 (H) 70 - 130 mg/dL Final     Comment:     Meter: [id number] : [id number] Arturo Ramey NA   05/12/2023 1810 196 (H) 70 - 130 mg/dL Final     Comment:     Meter: [id number] : [id number] Kei Martínezstas LIGAI       Assessment & Plan   Active Hospital Problems    Diagnosis  POA   • **Sepsis, due to unspecified organism, unspecified whether acute organ dysfunction present [A41.9]  Yes   • UTI (urinary tract infection) due to urinary indwelling catheter [T83.511A, N39.0]  Unknown   • Pneumonia [J18.9]  Unknown   • Lactic acidosis [E87.20]  Unknown   • Type 2 diabetes mellitus with hyperglycemia [E11.65]  Unknown   • Dementia without behavioral disturbance [F03.90]  Unknown   • Delirium due to another medical condition [F05]  Unknown   • Metabolic encephalopathy [G93.41]  Yes   • Adrenal nodule [E27.8]  Unknown   • History of ischemic stroke [Z86.73]  Not Applicable   • PEG (percutaneous endoscopic gastrostomy) status [Z93.1]  Not Applicable      Resolved Hospital Problems   No resolved problems to display.       PLAN  This is a 68-year-old female with a history of dementia prior stroke type 2 diabetes and hypertension who presented to the hospital with fevers chills lethargy and was intubated by EMS.  She was initially admitted to the ICU on May 4 with acute respiratory failure with hypoxia and underlying sepsis secondary to pneumonia and urinary tract infection.  She was extubated on May 8 and transferred out of the ICU.  She remains lethargic and delirious without ability to provide history.  She lives in a nursing home at baseline.  She has been up and down with oxygen requirements over the hospital stay.  When patient transferred out of the ICU A consulted for assistance with management of her type 2 diabetes and other issues and given her unclear goals of  care and issues with guardianship [facility] agreed to assume primary attending role.  -Diabetes currently decently controlled.  No plans to adjust management at this time.  -Pulmonary following and managing her oxygen issues plan to continue pulmonary hygiene.  -She has completed antibiotics for the pneumonia and the UTI.  -CCP working on guardianship and patient is appropriate for palliative care and DNR from my perspective.    Disposition  Expected discharge date/ time has not been documented.   Plan right now is to consider guardianship and DNR.  Should also be palliative care appropriate given her dementia and delirium.    Ruben Jordan MD  [facility]  05/14/23  09:42 EDT

## 2023-05-14 NOTE — PROGRESS NOTES
[facility] INPATIENT PROGRESS NOTE         [address]    2023      PATIENT IDENTIFICATION:  Name: Shama Cruz ADMIT: 2023   : 1955  PCP: Archie Murray MD    MRN: [id number] LOS: 10 days   AGE/SEX: 68 y.o. female  ROOM: [facility]                     LOS 10    Reason for visit: Respiratory failure      SUBJECTIVE:      On less oxygen.  No new issue overnight.      Objective   OBJECTIVE:    Vital Sign Min/Max for last 24 hours  Temp  Min: 93.4 °F (34.1 °C)  Max: 99.2 °F (37.3 °C)   BP  Min: 102/57  Max: 120/76   Pulse  Min: 87  Max: 98   Resp  Min: 18  Max: 18   SpO2  Min: 92 %  Max: 100 %   No data recorded   Weight  Min: 63.9 kg (140 lb 14 oz)  Max: 63.9 kg (140 lb 14 oz)                   FiO2 (%): 21 %     Body mass index is 24.17 kg/m².    Intake/Output Summary (Last 24 hours) at 2023 0739  Last data filed at 2023 0556  Gross per 24 hour   Intake 1463 ml   Output 800 ml   Net 663 ml         Exam:  GEN:  No distress, appears stated age.  Confused  EYES:   PERRL, anicteric sclerae  ENT:    External ears/nose normal, OP clear  NECK:  No adenopathy, midline trachea  LUNGS: Normal chest on inspection, palpation and diminished breath sounds at bases left greater than right on auscultation  CV:  Normal S1S2, without murmur  ABD:  Nontender, nondistended, no hepatosplenomegaly, +BS  EXT:  No edema.  No cyanosis or clubbing.  No mottling and normal cap refill.    Assessment     Scheduled meds:  acetaminophen, 1,000 mg, Oral, Once  acetylcysteine, 3 mL, Nebulization, BID - RT  amLODIPine, 10 mg, Per G Tube, Daily  castor oil-balsam peru, 1 application, Topical, Q12H  castor oil-balsam peru, 1 application, Topical, Q12H  chlorhexidine, 15 mL, Mouth/Throat, Q12H  famotidine, 20 mg, Oral, BID  furosemide, 20 mg, Oral, Daily  heparin (porcine), 5,000 Units, Subcutaneous, Q8H  insulin glargine, 45 Units, Subcutaneous, Daily  insulin regular, 4-24 Units, Subcutaneous,  Q6H  ipratropium-albuterol, 3 mL, Nebulization, 4x Daily - RT  Menthol-Zinc Oxide, 1 application, Topical, BID  metoprolol tartrate, 75 mg, Oral, Q12H  mupirocin, 1 application, Topical, Q12H  nystatin, , Topical, Q12H  potassium chloride, 20 mEq, Oral, Daily  Scopolamine, 1 patch, Transdermal, Q72H  sodium chloride, 30 mL/kg, Intravenous, Once  sodium chloride, 500 mL, Intravenous, Once  sodium chloride, 10 mL, Intravenous, Q12H      IV meds:                         Data Review:  Results from last 7 days   Lab Units 05/12/23  0636 05/10/23  0352 05/09/23  2045 05/09/23  0703 05/08/23  0328   SODIUM mmol/L 136 133*  --  135* 138   POTASSIUM mmol/L 4.9 4.2 4.6 3.0* 3.9   CHLORIDE mmol/L 97* 97*  --  97* 105   CO2 mmol/L 22.8 21.3*  --  23.0 24.0   BUN mg/dL 45* 32*  --  31* 26*   CREATININE mg/dL 0.75 0.52*  --  0.52* 0.57   GLUCOSE mg/dL 238* 183*  --  244* 285*   CALCIUM mg/dL 9.8 9.3  --  8.3* 8.8         Estimated Creatinine Clearance: 73.4 mL/min (by C-G formula based on SCr of 0.75 mg/dL).  Results from last 7 days   Lab Units 05/13/23  0525 05/12/23  0636 05/10/23  0352 05/09/23  0703 05/08/23  0328   WBC 10*3/mm3 11.68* 9.38 11.81* 13.34* 8.35   HEMOGLOBIN g/dL 12.2 12.4 12.2 11.2* 9.1*   PLATELETS 10*3/mm3 226 247 222 201 137*             Results from last 7 days   Lab Units 05/12/23  0953 05/08/23  0831   PH, ARTERIAL pH units 7.367 7.444   PO2 ART mm Hg 99.9 78.3*   PCO2, ARTERIAL mm Hg 43.3 37.2   HCO3 ART mmol/L 24.9 25.5     Results from last 7 days   Lab Units 05/13/23  1043 05/12/23  0636   PROCALCITONIN ng/mL 0.22 0.18         Glucose   Date/Time Value Ref Range Status   05/14/2023 0554 157 (H) 70 - 130 mg/dL Final     Comment:     Meter: [id number] : [id number] Jamshid STRONG   05/14/2023 0055 142 (H) 70 - 130 mg/dL Final     Comment:     Meter: [id number] : [id number] Jamshid STRONG   05/13/2023 1511 137 (H) 70 - 130 mg/dL Final     Comment:     Meter: [id number] : [id number] Foreign  Marilee NA   05/13/2023 1110 174 (H) 70 - 130 mg/dL Final     Comment:     Meter: [id number] : [id number] Foreign Hunt NA   05/13/2023 0559 179 (H) 70 - 130 mg/dL Final     Comment:     Meter: [id number] : [id number] Arturo Ramey NA   05/13/2023 0020 172 (H) 70 - 130 mg/dL Final     Comment:     Meter: [id number] : [id number] Arturo Tanvir NA   05/12/2023 1810 196 (H) 70 - 130 mg/dL Final     Comment:     Meter: [id number] : [id number] Kei STRONG       Chest x-ray 5/13 shows increasing left basilar infiltrate      Microbiology reviewed              Active Hospital Problems    Diagnosis  POA   • **Sepsis, due to unspecified organism, unspecified whether acute organ dysfunction present [A41.9]  Yes      Resolved Hospital Problems   No resolved problems to display.         ASSESSMENT:    Acute hypoxemic respiratory failure  Sepsis  Pneumonia  UTI  Lactic acidosis  Diabetes with worsened hyperglycemia secondary to steroids  Dementia  Status post PEG tube  3 cm indeterminate right adrenal nodule  Stridor: Resolved  History of previous stroke          PLAN:    Continue aggressive clearance measures.  Status post antibiotic course.   Keep head of the bed elevated for aspiration risk.  Recheck CXR tomorrow.  Wean oxygen as able. Oxygen down to 3LPM today from >8 yesterday.  [facility] taking over and we will follow for pulmonary issues only out of ICU.        Mega Malloy MD  Pulmonary and Critical Care Medicine  [facility]  5/14/2023    07:39 EDT

## 2023-05-15 ENCOUNTER — APPOINTMENT (OUTPATIENT)
Dept: GENERAL RADIOLOGY | Facility: HOSPITAL | Age: 68
End: 2023-05-15
Payer: MEDICARE

## 2023-05-15 LAB
ANION GAP SERPL CALCULATED.3IONS-SCNC: 12 MMOL/L (ref 5–15)
BASOPHILS # BLD AUTO: 0.05 10*3/MM3 (ref 0–0.2)
BASOPHILS NFR BLD AUTO: 0.5 % (ref 0–1.5)
BUN SERPL-MCNC: 36 MG/DL (ref 8–23)
BUN/CREAT SERPL: 51.4 (ref 7–25)
CALCIUM SPEC-SCNC: 8.8 MG/DL (ref 8.6–10.5)
CHLORIDE SERPL-SCNC: 96 MMOL/L (ref 98–107)
CO2 SERPL-SCNC: 27 MMOL/L (ref 22–29)
CREAT SERPL-MCNC: 0.7 MG/DL (ref 0.57–1)
DEPRECATED RDW RBC AUTO: 55.2 FL (ref 37–54)
EGFRCR SERPLBLD CKD-EPI 2021: 94.9 ML/MIN/1.73
EOSINOPHIL # BLD AUTO: 0.25 10*3/MM3 (ref 0–0.4)
EOSINOPHIL NFR BLD AUTO: 2.5 % (ref 0.3–6.2)
ERYTHROCYTE [DISTWIDTH] IN BLOOD BY AUTOMATED COUNT: 15 % (ref 12.3–15.4)
GLUCOSE BLDC GLUCOMTR-MCNC: 163 MG/DL (ref 70–130)
GLUCOSE BLDC GLUCOMTR-MCNC: 171 MG/DL (ref 70–130)
GLUCOSE BLDC GLUCOMTR-MCNC: 212 MG/DL (ref 70–130)
GLUCOSE SERPL-MCNC: 150 MG/DL (ref 65–99)
HCT VFR BLD AUTO: 34.3 % (ref 34–46.6)
HGB BLD-MCNC: 12.2 G/DL (ref 12–15.9)
IMM GRANULOCYTES # BLD AUTO: 0.07 10*3/MM3 (ref 0–0.05)
IMM GRANULOCYTES NFR BLD AUTO: 0.7 % (ref 0–0.5)
LYMPHOCYTES # BLD AUTO: 2.63 10*3/MM3 (ref 0.7–3.1)
LYMPHOCYTES NFR BLD AUTO: 26.4 % (ref 19.6–45.3)
MCH RBC QN AUTO: 35.4 PG (ref 26.6–33)
MCHC RBC AUTO-ENTMCNC: 35.6 G/DL (ref 31.5–35.7)
MCV RBC AUTO: 99.4 FL (ref 79–97)
MONOCYTES # BLD AUTO: 0.78 10*3/MM3 (ref 0.1–0.9)
MONOCYTES NFR BLD AUTO: 7.8 % (ref 5–12)
NEUTROPHILS NFR BLD AUTO: 6.2 10*3/MM3 (ref 1.7–7)
NEUTROPHILS NFR BLD AUTO: 62.1 % (ref 42.7–76)
NRBC BLD AUTO-RTO: 0.1 /100 WBC (ref 0–0.2)
PLATELET # BLD AUTO: 227 10*3/MM3 (ref 140–450)
PMV BLD AUTO: 11.8 FL (ref 6–12)
POTASSIUM SERPL-SCNC: 4.1 MMOL/L (ref 3.5–5.2)
RBC # BLD AUTO: 3.45 10*6/MM3 (ref 3.77–5.28)
SODIUM SERPL-SCNC: 135 MMOL/L (ref 136–145)
WBC NRBC COR # BLD: 9.98 10*3/MM3 (ref 3.4–10.8)

## 2023-05-15 PROCEDURE — 85025 COMPLETE CBC W/AUTO DIFF WBC: CPT | Performed by: HOSPITALIST

## 2023-05-15 PROCEDURE — 80048 BASIC METABOLIC PNL TOTAL CA: CPT | Performed by: HOSPITALIST

## 2023-05-15 PROCEDURE — 94664 DEMO&/EVAL PT USE INHALER: CPT

## 2023-05-15 PROCEDURE — 63710000001 INSULIN REGULAR HUMAN PER 5 UNITS: Performed by: INTERNAL MEDICINE

## 2023-05-15 PROCEDURE — 82948 REAGENT STRIP/BLOOD GLUCOSE: CPT

## 2023-05-15 PROCEDURE — 94799 UNLISTED PULMONARY SVC/PX: CPT

## 2023-05-15 PROCEDURE — 25010000002 HEPARIN (PORCINE) PER 1000 UNITS: Performed by: INTERNAL MEDICINE

## 2023-05-15 PROCEDURE — 97110 THERAPEUTIC EXERCISES: CPT

## 2023-05-15 PROCEDURE — 71046 X-RAY EXAM CHEST 2 VIEWS: CPT

## 2023-05-15 RX ADMIN — CASTOR OIL AND BALSAM, PERU 1 APPLICATION: 788; 87 OINTMENT TOPICAL at 21:06

## 2023-05-15 RX ADMIN — INSULIN HUMAN 4 UNITS: 100 INJECTION, SOLUTION PARENTERAL at 18:13

## 2023-05-15 RX ADMIN — NYSTATIN: 100000 POWDER TOPICAL at 23:01

## 2023-05-15 RX ADMIN — IPRATROPIUM BROMIDE AND ALBUTEROL SULFATE 3 ML: 2.5; .5 SOLUTION RESPIRATORY (INHALATION) at 10:59

## 2023-05-15 RX ADMIN — POTASSIUM CHLORIDE 20 MEQ: 1.5 POWDER, FOR SOLUTION ORAL at 09:14

## 2023-05-15 RX ADMIN — HEPARIN SODIUM 5000 UNITS: 5000 INJECTION INTRAVENOUS; SUBCUTANEOUS at 23:02

## 2023-05-15 RX ADMIN — IPRATROPIUM BROMIDE AND ALBUTEROL SULFATE 3 ML: 2.5; .5 SOLUTION RESPIRATORY (INHALATION) at 19:34

## 2023-05-15 RX ADMIN — MUPIROCIN 1 APPLICATION: 20 OINTMENT TOPICAL at 09:15

## 2023-05-15 RX ADMIN — METOPROLOL TARTRATE 75 MG: 25 TABLET, FILM COATED ORAL at 09:15

## 2023-05-15 RX ADMIN — CASTOR OIL AND BALSAM, PERU 1 APPLICATION: 788; 87 OINTMENT TOPICAL at 09:16

## 2023-05-15 RX ADMIN — HEPARIN SODIUM 5000 UNITS: 5000 INJECTION INTRAVENOUS; SUBCUTANEOUS at 14:30

## 2023-05-15 RX ADMIN — INSULIN HUMAN 4 UNITS: 100 INJECTION, SOLUTION PARENTERAL at 06:52

## 2023-05-15 RX ADMIN — AMLODIPINE BESYLATE 10 MG: 10 TABLET ORAL at 09:15

## 2023-05-15 RX ADMIN — MUPIROCIN 1 APPLICATION: 20 OINTMENT TOPICAL at 21:06

## 2023-05-15 RX ADMIN — IPRATROPIUM BROMIDE AND ALBUTEROL SULFATE 3 ML: 2.5; .5 SOLUTION RESPIRATORY (INHALATION) at 07:30

## 2023-05-15 RX ADMIN — IPRATROPIUM BROMIDE AND ALBUTEROL SULFATE 3 ML: 2.5; .5 SOLUTION RESPIRATORY (INHALATION) at 15:32

## 2023-05-15 RX ADMIN — ANORECTAL OINTMENT 1 APPLICATION: 15.7; .44; 24; 20.6 OINTMENT TOPICAL at 21:06

## 2023-05-15 RX ADMIN — METOPROLOL TARTRATE 75 MG: 25 TABLET, FILM COATED ORAL at 21:06

## 2023-05-15 RX ADMIN — NYSTATIN: 100000 POWDER TOPICAL at 09:15

## 2023-05-15 RX ADMIN — Medication 10 ML: at 09:16

## 2023-05-15 RX ADMIN — FAMOTIDINE 20 MG: 20 TABLET ORAL at 21:06

## 2023-05-15 RX ADMIN — INSULIN HUMAN 4 UNITS: 100 INJECTION, SOLUTION PARENTERAL at 02:36

## 2023-05-15 RX ADMIN — CHLORHEXIDINE GLUCONATE 0.12% ORAL RINSE 15 ML: 1.2 LIQUID ORAL at 09:15

## 2023-05-15 RX ADMIN — Medication 10 ML: at 23:02

## 2023-05-15 RX ADMIN — HEPARIN SODIUM 5000 UNITS: 5000 INJECTION INTRAVENOUS; SUBCUTANEOUS at 06:53

## 2023-05-15 RX ADMIN — ANORECTAL OINTMENT 1 APPLICATION: 15.7; .44; 24; 20.6 OINTMENT TOPICAL at 09:15

## 2023-05-15 RX ADMIN — FUROSEMIDE 20 MG: 20 TABLET ORAL at 09:14

## 2023-05-15 RX ADMIN — CASTOR OIL AND BALSAM, PERU 1 APPLICATION: 788; 87 OINTMENT TOPICAL at 23:01

## 2023-05-15 RX ADMIN — INSULIN GLARGINE-YFGN 45 UNITS: 100 INJECTION, SOLUTION SUBCUTANEOUS at 09:14

## 2023-05-15 RX ADMIN — INSULIN HUMAN 8 UNITS: 100 INJECTION, SOLUTION PARENTERAL at 11:52

## 2023-05-15 RX ADMIN — ACETYLCYSTEINE 3 ML: 200 SOLUTION ORAL; RESPIRATORY (INHALATION) at 07:35

## 2023-05-15 RX ADMIN — FAMOTIDINE 20 MG: 20 TABLET ORAL at 09:16

## 2023-05-15 NOTE — PLAN OF CARE
Goal Outcome Evaluation:  Plan of Care Reviewed With: patient        Progress: improving  Outcome Evaluation: VSS, has been on room air all evening w/no issues. Strict NPO. Tube feeds infusing at goal. Skin and incontinence care. Q2H turns. Will continue to monitor.

## 2023-05-15 NOTE — CASE MANAGEMENT/SOCIAL WORK
Continued Stay Note  [facility]     Patient Name: Shama Cruz  MRN: [id number]  Today's Date: 5/15/2023    Admit Date: 5/4/2023    Plan: Return to LTC bed at [facility].  Emergency guardianship petition filed today.   Discharge Plan       Row Name 05/15/23 1221       Plan    Plan Return to LTC bed at [facility].  Emergency guardianship petition filed today.    Plan Comments Emergency Guardianship petition filed.  Await word on court date assigned.  It will be within the next 7 days.  Date will be assigned by 4p today.  Jeri HERNANDEZ                   Discharge Codes    No documentation.                 Expected Discharge Date and Time       Expected Discharge Date Expected Discharge Time    May 19, 2023               Jeri Otto RN

## 2023-05-15 NOTE — THERAPY TREATMENT NOTE
Patient Name: Shama Cruz  : 1955    MRN: [id number]                              Today's Date: 5/15/2023       Admit Date: 2023    Visit Dx:     ICD-10-CM ICD-9-CM   1. Sepsis, due to unspecified organism, unspecified whether acute organ dysfunction present  A41.9 038.9     995.91   2. Acute UTI  N39.0 599.0   3. Altered mental status, unspecified altered mental status type  R41.82 780.97   4. Hyperglycemia  R73.9 790.29   5. Hyponatremia  E87.1 276.1   6. Anemia, unspecified type  D64.9 285.9   7. Elevated troponin  R77.8 790.6     Patient Active Problem List   Diagnosis   • Sepsis, due to unspecified organism, unspecified whether acute organ dysfunction present   • UTI (urinary tract infection) due to urinary indwelling catheter   • Pneumonia   • Lactic acidosis   • Type 2 diabetes mellitus with hyperglycemia   • Dementia without behavioral disturbance   • Delirium due to another medical condition   • Metabolic encephalopathy   • Adrenal nodule   • History of ischemic stroke   • PEG (percutaneous endoscopic gastrostomy) status     Past Medical History:   Diagnosis Date   • Cerebral infarction    • Chronic respiratory failure    • Dementia    • Depression    • Diabetes mellitus    • Hypertension      History reviewed. No pertinent surgical history.   General Information     Row Name 05/15/23 1548          Physical Therapy Time and Intention    Document Type therapy note (daily note)  -MS     Mode of Treatment physical therapy;individual therapy  -MS     Row Name 05/15/23 1544          General Information    Patient Profile Reviewed yes  -MS     Existing Precautions/Restrictions fall   Exit alarm; Contact Isolation  -MS     Barriers to Rehab cognitive status  -MS           User Key  (r) = Recorded By, (t) = Taken By, (c) = Cosigned By    Initials Name Provider Type    Andre Ordonez, PT Physical Therapist               Mobility     Row Name 05/15/23 1546          Bed Mobility    Comment, (Bed  Mobility) Pt. moans at times but otherwise, no verbal communication.  Pt. Moderately resistive to any attempts at mobility this date including ROM, limiting chances of sitting EOB.   -MS           User Key  (r) = Recorded By, (t) = Taken By, (c) = Cosigned By    Initials Name Provider Type    Andre Ordonez, PT Physical Therapist               Obj/Interventions     Row Name 05/15/23 1547          Motor Skills    Therapeutic Exercise --  BUE/LE ther. ex. program (AA/PROM) x 10 reps completed (Ankle pumps, Heel Slides, Hip Abduction, Shld Flexion, Elbow Flex/Ext)  -MS           User Key  (r) = Recorded By, (t) = Taken By, (c) = Cosigned By    Initials Name Provider Type    Andre Ordonez, PT Physical Therapist               Goals/Plan    No documentation.                Clinical Impression     Row Name 05/15/23 1548          Pain    Pre/Posttreatment Pain Comment Pt. will moan at times but does not seem to correlate with any particular movement.  -MS     Row Name 05/15/23 1548          Positioning and Restraints    Pre-Treatment Position in bed  -MS     Post Treatment Position bed  -MS     In Bed notified nsg;supine;call light within reach;encouraged to call for assist;exit alarm on  All lines intact. V.S.S.  -MS           User Key  (r) = Recorded By, (t) = Taken By, (c) = Cosigned By    Initials Name Provider Type    Andre Ordonez, PT Physical Therapist               Outcome Measures     Row Name 05/15/23 1549          How much help from another person do you currently need...    Turning from your back to your side while in flat bed without using bedrails? 1  -MS     Moving from lying on back to sitting on the side of a flat bed without bedrails? 1  -MS     Moving to and from a bed to a chair (including a wheelchair)? 1  -MS     Standing up from a chair using your arms (e.g., wheelchair, bedside chair)? 1  -MS     Climbing 3-5 steps with a railing? 1  -MS     To walk in hospital room? 1  -MS      -[facility] 6 Clicks Score (PT) 6  -MS     Highest level of mobility 2 --> Bed activities/dependent transfer  -MS     Row Name 05/15/23 1549          Functional Assessment    Outcome Measure Options AM-PAC 6 Clicks Basic Mobility (PT)  -MS           User Key  (r) = Recorded By, (t) = Taken By, (c) = Cosigned By    Initials Name Provider Type    MS De La RosaAndre, PT Physical Therapist                             Physical Therapy Education     Title: PT OT SLP Therapies (In Progress)     Topic: Physical Therapy (In Progress)     Point: Mobility training (Done)     Learning Progress Summary           Patient Acceptance, E, VU,NR by  at 5/10/2023 1237                   Point: Home exercise program (In Progress)     Learning Progress Summary           Patient Nonacceptance, E,D, NL,NR by MS at 5/15/2023 1549    Acceptance, E, VU,NR by  at 5/10/2023 1237                               User Key     Initials Effective Dates Name Provider Type Discipline    MS 06/16/21 -  Andre De La Rosa, PT Physical Therapist PT     06/16/21 -  Janeth Lynn, PT Physical Therapist PT              PT Recommendation and Plan     Plan of Care Reviewed With: patient  Outcome Evaluation: Upon entering room, pt. supine in bed, initially asleep but does open her eyes to tactile stimuli.  Other than occ. moaning, pt. is non-verbal and follows simple one step commands ~25% of the time (AAROM).  Pt. also moderately resistive to all attempts at mobility this date limiting her chances of sitting upright on EOB.  BUE/LE ther. ex. (AA/PROM) program x 10 reps completed for general strengthening/stretching.  Will continue to progress functional mobility as tolerated.     Time Calculation:    PT Charges     Row Name 05/15/23 5841             Time Calculation    Start Time 1310  -MS      Stop Time 1325  -MS      Time Calculation (min) 15 min  -MS      PT Received On 05/15/23  -MS      PT - Next Appointment 05/16/23  -MS         Time Calculation- PT     Total Timed Code Minutes- PT 14 minute(s)  -MS            User Key  (r) = Recorded By, (t) = Taken By, (c) = Cosigned By    Initials Name Provider Type    Andre Ordonez, PT Physical Therapist              Therapy Charges for Today     Code Description Service Date Service Provider Modifiers Qty    [id number]  PT THER PROC EA 15 MIN 5/15/2023 Andre De La Rosa, PT GP 1          PT G-Codes  Outcome Measure Options: AM-PAC 6 Clicks Basic Mobility (PT)  AM-PAC 6 Clicks Score (PT): 6       Andre De La Rosa, PT  5/15/2023

## 2023-05-15 NOTE — PROGRESS NOTES
[facility]ed[facility] to Hospital Care    [phone]   LOS: 11 days     Name: Shama Cruz  Age/Sex: 68 y.o. female  :  1955        PCP: Archie Murray MD  Chief Complaint   Patient presents with   • Respiratory Distress      Subjective   Rested well overnight.  Remains confused and lethargic.    acetaminophen, 1,000 mg, Oral, Once  acetylcysteine, 3 mL, Nebulization, BID - RT  amLODIPine, 10 mg, Per G Tube, Daily  castor oil-balsam peru, 1 application, Topical, Q12H  castor oil-balsam peru, 1 application, Topical, Q12H  chlorhexidine, 15 mL, Mouth/Throat, Q12H  famotidine, 20 mg, Oral, BID  furosemide, 20 mg, Oral, Daily  heparin (porcine), 5,000 Units, Subcutaneous, Q8H  insulin glargine, 45 Units, Subcutaneous, Daily  insulin regular, 4-24 Units, Subcutaneous, Q6H  ipratropium-albuterol, 3 mL, Nebulization, 4x Daily - RT  Menthol-Zinc Oxide, 1 application, Topical, BID  metoprolol tartrate, 75 mg, Oral, Q12H  mupirocin, 1 application, Topical, Q12H  nystatin, , Topical, Q12H  potassium chloride, 20 mEq, Oral, Daily  Scopolamine, 1 patch, Transdermal, Q72H  sodium chloride, 30 mL/kg, Intravenous, Once  sodium chloride, 500 mL, Intravenous, Once  sodium chloride, 10 mL, Intravenous, Q12H           Objective   Vital Signs  Temp:  [96.5 °F (35.8 °C)-98.9 °F (37.2 °C)] 97.8 °F (36.6 °C)  Heart Rate:  [] 88  Resp:  [16-20] 16  BP: (108-128)/(54-71) 120/54  Body mass index is 24.21 kg/m².    Intake/Output Summary (Last 24 hours) at 5/15/2023 0809  Last data filed at 5/15/2023 0600  Gross per 24 hour   Intake 1571 ml   Output 1350 ml   Net 221 ml       Physical Exam  Vitals and nursing note reviewed.   Constitutional:       Appearance: Normal appearance. She is not ill-appearing.   HENT:      Head: Normocephalic and atraumatic.   Cardiovascular:      Rate and Rhythm: Normal rate and regular rhythm.   Pulmonary:      Effort: Pulmonary effort is normal. No respiratory distress.      Breath sounds: Stridor  present.      Comments: Noted some inspiratory stridor  Abdominal:      General: Bowel sounds are normal. There is distension.      Palpations: Abdomen is soft.      Comments: Noted bruising over the lower abdomen PEG in good position no erythema or signs of irritation or infection   Neurological:      Mental Status: She is alert. She is disoriented.      Comments: Lethargic minimally responsive           Results Review:       I reviewed the patient's new clinical results.  Results from last 7 days   Lab Units 05/15/23  0529 05/13/23  0525 05/12/23  0636 05/10/23  0352 05/09/23  0703   WBC 10*3/mm3 9.98 11.68* 9.38 11.81* 13.34*   HEMOGLOBIN g/dL 12.2 12.2 12.4 12.2 11.2*   PLATELETS 10*3/mm3 227 226 247 222 201     Results from last 7 days   Lab Units 05/15/23  0529 05/12/23  0636 05/10/23  0352 05/09/23  2045 05/09/23  0703   SODIUM mmol/L 135* 136 133*  --  135*   POTASSIUM mmol/L 4.1 4.9 4.2 4.6 3.0*   CHLORIDE mmol/L 96* 97* 97*  --  97*   CO2 mmol/L 27.0 22.8 21.3*  --  23.0   BUN mg/dL 36* 45* 32*  --  31*   CREATININE mg/dL 0.70 0.75 0.52*  --  0.52*   CALCIUM mg/dL 8.8 9.8 9.3  --  8.3*   MAGNESIUM mg/dL  --  2.3  --   --  2.0   PHOSPHORUS mg/dL  --  4.8*  --   --   --    Estimated Creatinine Clearance: 78.8 mL/min (by C-G formula based on SCr of 0.7 mg/dL).    No results found for: HGBA1C  Glucose   Date/Time Value Ref Range Status   05/15/2023 0553 163 (H) 70 - 130 mg/dL Final     Comment:     Meter: [id number] : [id number] Carringtonarnold Blackmon NA   05/14/2023 2354 164 (H) 70 - 130 mg/dL Final     Comment:     Meter: [id number] : [id number] JeffCharissaarnold Alexia NA   05/14/2023 1623 167 (H) 70 - 130 mg/dL Final     Comment:     Meter: [id number] : [id number] Foreign STRONG   05/14/2023 1227 188 (H) 70 - 130 mg/dL Final     Comment:     Meter: [id number] : [id number] Foreign STRONG   05/14/2023 0554 157 (H) 70 - 130 mg/dL Final     Comment:     Meter: [id number] : [id number] Jamshid STRONG    05/14/2023 0055 142 (H) 70 - 130 mg/dL Final     Comment:     Meter: [id number] : [id number] Jamshid STRONG   05/13/2023 1511 137 (H) 70 - 130 mg/dL Final     Comment:     Meter: [id number] : [id number] Foreign STRONG   05/13/2023 1110 174 (H) 70 - 130 mg/dL Final     Comment:     Meter: [id number] : [id number] Foreign STRONG       Assessment & Plan   Active Hospital Problems    Diagnosis  POA   • **Sepsis, due to unspecified organism, unspecified whether acute organ dysfunction present [A41.9]  Yes   • UTI (urinary tract infection) due to urinary indwelling catheter [T83.511A, N39.0]  Unknown   • Pneumonia [J18.9]  Unknown   • Lactic acidosis [E87.20]  Unknown   • Type 2 diabetes mellitus with hyperglycemia [E11.65]  Unknown   • Dementia without behavioral disturbance [F03.90]  Unknown   • Delirium due to another medical condition [F05]  Unknown   • Metabolic encephalopathy [G93.41]  Yes   • Adrenal nodule [E27.8]  Unknown   • History of ischemic stroke [Z86.73]  Not Applicable   • PEG (percutaneous endoscopic gastrostomy) status [Z93.1]  Not Applicable      Resolved Hospital Problems   No resolved problems to display.       PLAN  This is a 68-year-old female with a history of dementia prior stroke type 2 diabetes and hypertension who presented to the hospital with fevers chills lethargy and was intubated by EMS.  She was initially admitted to the ICU on May 4 with acute respiratory failure with hypoxia and underlying sepsis secondary to pneumonia and urinary tract infection.  She was extubated on May 8 and transferred out of the ICU.  She remains lethargic and delirious without ability to provide history.  She lives in a nursing home at baseline.  She has been up and down with oxygen requirements over the hospital stay.  When patient transferred out of the ICU A consulted for assistance with management of her type 2 diabetes and other issues and given her unclear goals of care and issues with  guardianship [facility] agreed to assume primary attending role.  -Diabetes currently decently controlled.  No plans to adjust management at this time.  -Pulmonary following and managing her oxygen issues plan to continue pulmonary hygiene.  -She has completed antibiotics for the pneumonia and the UTI.  -CCP working on guardianship and patient is appropriate for palliative care and DNR from my perspective.    Disposition  Expected Discharge Date: 5/19/2023; Expected Discharge Time:    Plan right now is to consider guardianship and DNR.  Should also be palliative care appropriate given her dementia and delirium.    Ruben Jordan MD  [facility]  05/15/23  08:09 EDT

## 2023-05-15 NOTE — PROGRESS NOTES
[facility] INPATIENT PROGRESS NOTE         [address]    5/15/2023      PATIENT IDENTIFICATION:  Name: Shama Cruz ADMIT: 2023   : 1955  PCP: Archie Murray MD    MRN: [id number] LOS: 11 days   AGE/SEX: 68 y.o. female  ROOM: [facility]                     LOS 11    Reason for visit: Respiratory failure      SUBJECTIVE:      Resting comfortably.  Not requiring any supplemental oxygen.  No productive cough or chest pain complaints.  Diminished breath sounds but no wheezing or rhonchi on auscultation.      Objective   OBJECTIVE:    Vital Sign Min/Max for last 24 hours  Temp  Min: 96.5 °F (35.8 °C)  Max: 98.9 °F (37.2 °C)   BP  Min: 108/59  Max: 128/69   Pulse  Min: 87  Max: 101   Resp  Min: 16  Max: 20   SpO2  Min: 81 %  Max: 98 %   No data recorded   Weight  Min: 64 kg (141 lb 1.5 oz)  Max: 64 kg (141 lb 1.5 oz)                   FiO2 (%): 21 %     Body mass index is 24.21 kg/m².    Intake/Output Summary (Last 24 hours) at 5/15/2023 1005  Last data filed at 5/15/2023 0600  Gross per 24 hour   Intake 1571 ml   Output 1350 ml   Net 221 ml         Exam:  GEN:  No distress, appears stated age.  Confused  EYES:   PERRL, anicteric sclerae  ENT:    External ears/nose normal, OP clear  NECK:  No adenopathy, midline trachea  LUNGS: Normal chest on inspection, palpation and diminished breath sounds at bases on auscultation  CV:  Normal S1S2, without murmur  ABD:  Nontender, nondistended, no hepatosplenomegaly, +BS  EXT:  No edema.  No cyanosis or clubbing.  No mottling and normal cap refill.    Assessment     Scheduled meds:  acetaminophen, 1,000 mg, Oral, Once  acetylcysteine, 3 mL, Nebulization, BID - RT  amLODIPine, 10 mg, Per G Tube, Daily  castor oil-balsam peru, 1 application, Topical, Q12H  castor oil-balsam peru, 1 application, Topical, Q12H  chlorhexidine, 15 mL, Mouth/Throat, Q12H  famotidine, 20 mg, Oral, BID  furosemide, 20 mg, Oral, Daily  heparin (porcine), 5,000 Units,  Subcutaneous, Q8H  insulin glargine, 45 Units, Subcutaneous, Daily  insulin regular, 4-24 Units, Subcutaneous, Q6H  ipratropium-albuterol, 3 mL, Nebulization, 4x Daily - RT  Menthol-Zinc Oxide, 1 application, Topical, BID  metoprolol tartrate, 75 mg, Oral, Q12H  mupirocin, 1 application, Topical, Q12H  nystatin, , Topical, Q12H  potassium chloride, 20 mEq, Oral, Daily  Scopolamine, 1 patch, Transdermal, Q72H  sodium chloride, 30 mL/kg, Intravenous, Once  sodium chloride, 500 mL, Intravenous, Once  sodium chloride, 10 mL, Intravenous, Q12H      IV meds:                         Data Review:  Results from last 7 days   Lab Units 05/15/23  0529 05/12/23  0636 05/10/23  0352 05/09/23  2045 05/09/23  0703   SODIUM mmol/L 135* 136 133*  --  135*   POTASSIUM mmol/L 4.1 4.9 4.2 4.6 3.0*   CHLORIDE mmol/L 96* 97* 97*  --  97*   CO2 mmol/L 27.0 22.8 21.3*  --  23.0   BUN mg/dL 36* 45* 32*  --  31*   CREATININE mg/dL 0.70 0.75 0.52*  --  0.52*   GLUCOSE mg/dL 150* 238* 183*  --  244*   CALCIUM mg/dL 8.8 9.8 9.3  --  8.3*         Estimated Creatinine Clearance: 78.8 mL/min (by C-G formula based on SCr of 0.7 mg/dL).  Results from last 7 days   Lab Units 05/15/23  0529 05/13/23  0525 05/12/23  0636 05/10/23  0352 05/09/23  0703   WBC 10*3/mm3 9.98 11.68* 9.38 11.81* 13.34*   HEMOGLOBIN g/dL 12.2 12.2 12.4 12.2 11.2*   PLATELETS 10*3/mm3 227 226 247 222 201             Results from last 7 days   Lab Units 05/12/23  0953   PH, ARTERIAL pH units 7.367   PO2 ART mm Hg 99.9   PCO2, ARTERIAL mm Hg 43.3   HCO3 ART mmol/L 24.9     Results from last 7 days   Lab Units 05/13/23  1043 05/12/23  0636   PROCALCITONIN ng/mL 0.22 0.18         Glucose   Date/Time Value Ref Range Status   05/15/2023 0553 163 (H) 70 - 130 mg/dL Final     Comment:     Meter: [id number] : [id number] Patience STRONG   05/14/2023 2354 164 (H) 70 - 130 mg/dL Final     Comment:     Meter: [id number] : [id number] Patience STRONG   05/14/2023 1623 167  (H) 70 - 130 mg/dL Final     Comment:     Meter: [id number] : [id number] Foreign STRONG   05/14/2023 1227 188 (H) 70 - 130 mg/dL Final     Comment:     Meter: [id number] : [id number] Foreign STRONG   05/14/2023 0554 157 (H) 70 - 130 mg/dL Final     Comment:     Meter: [id number] : [id number] Jamshid STRONG   05/14/2023 0055 142 (H) 70 - 130 mg/dL Final     Comment:     Meter: [id number] : [id number] Jamshid Ray NA   05/13/2023 1511 137 (H) 70 - 130 mg/dL Final     Comment:     Meter: [id number] : [id number] Foreign STRONG       Chest x-ray 5/15 shows small pleural effusions.      Microbiology reviewed              Active Hospital Problems    Diagnosis  POA   • **Sepsis, due to unspecified organism, unspecified whether acute organ dysfunction present [A41.9]  Yes   • UTI (urinary tract infection) due to urinary indwelling catheter [T83.511A, N39.0]  Unknown   • Pneumonia [J18.9]  Unknown   • Lactic acidosis [E87.20]  Unknown   • Type 2 diabetes mellitus with hyperglycemia [E11.65]  Unknown   • Dementia without behavioral disturbance [F03.90]  Unknown   • Delirium due to another medical condition [F05]  Unknown   • Metabolic encephalopathy [G93.41]  Yes   • Adrenal nodule [E27.8]  Unknown   • History of ischemic stroke [Z86.73]  Not Applicable   • PEG (percutaneous endoscopic gastrostomy) status [Z93.1]  Not Applicable      Resolved Hospital Problems   No resolved problems to display.         ASSESSMENT:    Acute hypoxemic respiratory failure  Sepsis  Pneumonia  UTI  Lactic acidosis  Diabetes with worsened hyperglycemia secondary to steroids  Dementia  Status post PEG tube  3 cm indeterminate right adrenal nodule  Stridor: Resolved  History of previous stroke          PLAN:    Making good progress.  On room air.  Discontinue Mucomyst.  Status post antibiotic course.   Keep head of the bed elevated for aspiration risk.          Mega Malloy MD  Pulmonary and Critical Care Medicine  [facility], [facility]  5/15/2023    10:05 EDT

## 2023-05-15 NOTE — PLAN OF CARE
Goal Outcome Evaluation:  Plan of Care Reviewed With: patient           Outcome Evaluation: Upon entering room, pt. supine in bed, initially asleep but does open her eyes to tactile stimuli.  Other than occ. moaning, pt. is non-verbal and follows simple one step commands ~25% of the time (AAROM).  Pt. also moderately resistive to all attempts at mobility this date limiting her chances of sitting upright on EOB.  BUE/LE ther. ex. (AA/PROM) program x 10 reps completed for general strengthening/stretching.  Will continue to progress functional mobility as tolerated.    Patient was not wearing a face mask during this therapy encounter. Therapist used appropriate personal protective equipment including eye protection, mask, and gloves.  Mask used was standard procedure mask. Appropriate PPE was worn during the entire therapy session. Hand hygiene was completed before and after therapy session. Patient is not in enhanced droplet precautions.

## 2023-05-16 LAB
GLUCOSE BLDC GLUCOMTR-MCNC: 146 MG/DL (ref 70–130)
GLUCOSE BLDC GLUCOMTR-MCNC: 158 MG/DL (ref 70–130)
GLUCOSE BLDC GLUCOMTR-MCNC: 172 MG/DL (ref 70–130)
GLUCOSE BLDC GLUCOMTR-MCNC: 176 MG/DL (ref 70–130)
GLUCOSE BLDC GLUCOMTR-MCNC: 186 MG/DL (ref 70–130)

## 2023-05-16 PROCEDURE — 94664 DEMO&/EVAL PT USE INHALER: CPT

## 2023-05-16 PROCEDURE — 94668 MNPJ CHEST WALL SBSQ: CPT

## 2023-05-16 PROCEDURE — 94760 N-INVAS EAR/PLS OXIMETRY 1: CPT

## 2023-05-16 PROCEDURE — 94799 UNLISTED PULMONARY SVC/PX: CPT

## 2023-05-16 PROCEDURE — 63710000001 INSULIN REGULAR HUMAN PER 5 UNITS: Performed by: INTERNAL MEDICINE

## 2023-05-16 PROCEDURE — 25010000002 HEPARIN (PORCINE) PER 1000 UNITS: Performed by: INTERNAL MEDICINE

## 2023-05-16 PROCEDURE — 82948 REAGENT STRIP/BLOOD GLUCOSE: CPT

## 2023-05-16 PROCEDURE — 94761 N-INVAS EAR/PLS OXIMETRY MLT: CPT

## 2023-05-16 RX ORDER — CHOLESTYRAMINE 4 G/9G
1 POWDER, FOR SUSPENSION ORAL EVERY 12 HOURS SCHEDULED
Status: DISCONTINUED | OUTPATIENT
Start: 2023-05-16 | End: 2023-05-17 | Stop reason: HOSPADM

## 2023-05-16 RX ORDER — PANTOPRAZOLE SODIUM 40 MG/1
40 TABLET, DELAYED RELEASE ORAL
Status: DISCONTINUED | OUTPATIENT
Start: 2023-05-17 | End: 2023-05-17 | Stop reason: HOSPADM

## 2023-05-16 RX ADMIN — Medication 10 ML: at 08:53

## 2023-05-16 RX ADMIN — CASTOR OIL AND BALSAM, PERU 1 APPLICATION: 788; 87 OINTMENT TOPICAL at 08:52

## 2023-05-16 RX ADMIN — HEPARIN SODIUM 5000 UNITS: 5000 INJECTION INTRAVENOUS; SUBCUTANEOUS at 21:09

## 2023-05-16 RX ADMIN — INSULIN GLARGINE-YFGN 45 UNITS: 100 INJECTION, SOLUTION SUBCUTANEOUS at 08:53

## 2023-05-16 RX ADMIN — CASTOR OIL AND BALSAM, PERU 1 APPLICATION: 788; 87 OINTMENT TOPICAL at 21:09

## 2023-05-16 RX ADMIN — INSULIN HUMAN 4 UNITS: 100 INJECTION, SOLUTION PARENTERAL at 06:16

## 2023-05-16 RX ADMIN — IPRATROPIUM BROMIDE AND ALBUTEROL SULFATE 3 ML: 2.5; .5 SOLUTION RESPIRATORY (INHALATION) at 11:06

## 2023-05-16 RX ADMIN — IPRATROPIUM BROMIDE AND ALBUTEROL SULFATE 3 ML: 2.5; .5 SOLUTION RESPIRATORY (INHALATION) at 07:58

## 2023-05-16 RX ADMIN — ANORECTAL OINTMENT 1 APPLICATION: 15.7; .44; 24; 20.6 OINTMENT TOPICAL at 21:07

## 2023-05-16 RX ADMIN — METOPROLOL TARTRATE 75 MG: 25 TABLET, FILM COATED ORAL at 21:07

## 2023-05-16 RX ADMIN — NYSTATIN: 100000 POWDER TOPICAL at 21:08

## 2023-05-16 RX ADMIN — MUPIROCIN 1 APPLICATION: 20 OINTMENT TOPICAL at 08:52

## 2023-05-16 RX ADMIN — AMLODIPINE BESYLATE 10 MG: 10 TABLET ORAL at 08:51

## 2023-05-16 RX ADMIN — CHOLESTYRAMINE 1 PACKET: 4 POWDER, FOR SUSPENSION ORAL at 12:25

## 2023-05-16 RX ADMIN — CHLORHEXIDINE GLUCONATE 0.12% ORAL RINSE 15 ML: 1.2 LIQUID ORAL at 21:08

## 2023-05-16 RX ADMIN — CHOLESTYRAMINE 1 PACKET: 4 POWDER, FOR SUSPENSION ORAL at 21:07

## 2023-05-16 RX ADMIN — HEPARIN SODIUM 5000 UNITS: 5000 INJECTION INTRAVENOUS; SUBCUTANEOUS at 18:02

## 2023-05-16 RX ADMIN — INSULIN HUMAN 4 UNITS: 100 INJECTION, SOLUTION PARENTERAL at 00:16

## 2023-05-16 RX ADMIN — CHLORHEXIDINE GLUCONATE 0.12% ORAL RINSE 15 ML: 1.2 LIQUID ORAL at 08:52

## 2023-05-16 RX ADMIN — FAMOTIDINE 20 MG: 20 TABLET ORAL at 08:53

## 2023-05-16 RX ADMIN — INSULIN HUMAN 4 UNITS: 100 INJECTION, SOLUTION PARENTERAL at 18:02

## 2023-05-16 RX ADMIN — INSULIN HUMAN 4 UNITS: 100 INJECTION, SOLUTION PARENTERAL at 12:25

## 2023-05-16 RX ADMIN — CASTOR OIL AND BALSAM, PERU 1 APPLICATION: 788; 87 OINTMENT TOPICAL at 21:08

## 2023-05-16 RX ADMIN — IPRATROPIUM BROMIDE AND ALBUTEROL SULFATE 3 ML: 2.5; .5 SOLUTION RESPIRATORY (INHALATION) at 20:47

## 2023-05-16 RX ADMIN — SCOPALAMINE 1 PATCH: 1 PATCH, EXTENDED RELEASE TRANSDERMAL at 12:38

## 2023-05-16 RX ADMIN — ANORECTAL OINTMENT 1 APPLICATION: 15.7; .44; 24; 20.6 OINTMENT TOPICAL at 08:52

## 2023-05-16 RX ADMIN — IPRATROPIUM BROMIDE AND ALBUTEROL SULFATE 3 ML: 2.5; .5 SOLUTION RESPIRATORY (INHALATION) at 15:31

## 2023-05-16 RX ADMIN — HEPARIN SODIUM 5000 UNITS: 5000 INJECTION INTRAVENOUS; SUBCUTANEOUS at 06:17

## 2023-05-16 RX ADMIN — MUPIROCIN 1 APPLICATION: 20 OINTMENT TOPICAL at 21:08

## 2023-05-16 RX ADMIN — METOPROLOL TARTRATE 75 MG: 25 TABLET, FILM COATED ORAL at 08:51

## 2023-05-16 RX ADMIN — Medication 10 ML: at 21:09

## 2023-05-16 RX ADMIN — NYSTATIN: 100000 POWDER TOPICAL at 08:52

## 2023-05-16 RX ADMIN — POTASSIUM CHLORIDE 20 MEQ: 1.5 POWDER, FOR SOLUTION ORAL at 08:51

## 2023-05-16 RX ADMIN — FUROSEMIDE 20 MG: 20 TABLET ORAL at 08:51

## 2023-05-16 NOTE — CASE MANAGEMENT/SOCIAL WORK
Continued Stay Note  [facility]     Patient Name: Shama Cruz  MRN: [id number]  Today's Date: 5/16/2023    Admit Date: 5/4/2023    Plan: [facility]   Discharge Plan     Row Name 05/16/23 1309       Plan    Plan [facility]    Plan Comments Discharge orders noted.  Ambulance available tomorrow at 9am.  [facility] notified of pt return.  Packet in CCP office.  MEGAN Gonzalez RN                   Discharge Codes    No documentation.               Expected Discharge Date and Time     Expected Discharge Date Expected Discharge Time    May 16, 2023             Sadia Gonzalez, RN

## 2023-05-16 NOTE — CASE MANAGEMENT/SOCIAL WORK
"Physicians Statement of Medical Necessity for  Ambulance Transportation    GENERAL INFORMATION     Name: Shama Cruz  YOB: 1955  Medicare #: [place] Medicare Replacement: [id number]  Transport Date: 5/17/2023 (Valid for round trips this date, or for scheduled repetitive trips for 60 days from the date signed below.)  Origin: [facility]  Destination: [facility]  Is the Patient's stay covered under Medicare Part A (PPS/DRG?)Yes  Closest appropriate facility? Yes  If this a hosp-hosp transfer? No  Is this a hospice patient? No    MEDICAL NECESSITY QUESTIONAIRE    Ambulance Transportation is medically necessary only if other means of transportation are contraindicated or would be potentially harmful to the patient.  To meet this requirement, the patient must be either \"bed confined\" or suffer from a condition such that transport by means other than an ambulance is contraindicated by the patient's condition.  The following questions must be answered by the healthcare professional signing below for this form to be valid:     1) Describe the MEDICAL CONDITION (physical and/or mental) of this patient AT THE TIME OF AMBULANCE TRANSPORT that requires the patient to be transported in an ambulance, and why transport by other means is contraindicated by the patient's condition:   Past Medical History:   Diagnosis Date   • Cerebral infarction    • Chronic respiratory failure    • Dementia    • Depression    • Diabetes mellitus    • Hypertension       History reviewed. No pertinent surgical history.   2) Is this patient \"bed confined\" as defined below?Yes   To be \"bed confined\" the patient must satisfy all three of the following criteria:  (1) unable to get up from bed without assistance; AND (2) unable to ambulate;  AND (3) unable to sit in a chair or wheelchair.  3) Can this patient safely be transported by car or wheelchair van (I.e., may safely sit during transport, without an attendant or " monitoring?)No   4. In addition to completing questions 1-3 above, please check any of the following conditions that apply*:          *Note: supporting documentation for any boxes checked must be maintained in the patient's medical records Patient is confused, Requires oxygen - unable to self administer and Unable to tolerate seated position for time needed to transport      SIGNATURE OF PHYSICIAN OR OTHER AUTHORIZED HEALTHCARE PROFESSIONAL    I certify that the above information is true and correct based on my evaluation of this patient, and represent that the patient requires transport by ambulance and that other forms of transport are contraindicated.  I understand that this information will be used by the Centers for Medicare and Medicaid Services (CMS) to support the determiniation of medical necessity for ambulance services, and I represent that I have personal knowledge of the patient's condition at the time of transport.    X   If this box is checked, I also certify that the patient is physically or mentally incapable of signing the ambulance service's claim form and that the institution with which I am affiliated has furnished care, services or assistance to the patient.  My signature below is made on behalf of the patient pursuant to 42 .36(b)(4). In accordance with 42 .37, the specific reason(s) that the patient is physically or mentally incapable of signing the claim for is as follows:    Signature of Physician or Healthcare Professional  Date/Time:        (For Scheduled repetitive transport, this form is not valid for transports performed more than 60 days after this date).                                                                                                                                            --------------------------------------------------------------------------------------------  Printed Name and Credentials of Physician or Authorized Healthcare Professional      *Form must be signed by patient's attending physician for scheduled, repetitive transports,.  For non-repetitive ambulance transports, if unable to obtain the signature of the attending physician, any of the following may sign (please select below):     Physician  Clinical Nurse Specialist  Registered Nurse     Physician Assistant  Discharge Planner  Licensed Practical Nurse     Nurse Practitioner

## 2023-05-16 NOTE — PROGRESS NOTES
Name: Shama Cruz ADMIT: 2023   : 1955  PCP: Archie Murray MD    MRN: [id number] LOS: 12 days   AGE/SEX: 68 y.o. female  ROOM: [facility]     Subjective   Subjective   Patient appears chronically ill, generally weak, relatively comfortable, and in no apparent distress.  Describes epigastric tenderness to the best of her ability.  Discussed with RN--tube feeds tolerated.    Review of Systems   Respiratory: Negative for cough and shortness of breath.    Cardiovascular: Negative for chest pain and leg swelling.   Gastrointestinal: Positive for abdominal pain (Epigastric dull pain). Negative for diarrhea (Loose stool most likely due to tube feed formula).        Objective   Objective   Vital Signs  Temp:  [97.3 °F (36.3 °C)-99 °F (37.2 °C)] 98.1 °F (36.7 °C)  Heart Rate:  [67-89] 68  Resp:  [17-20] 18  BP: ()/(51-71) 123/65  SpO2:  [91 %-100 %] 100 %  on  Flow (L/min):  [1-2] 1;   Device (Oxygen Therapy): humidified;nasal cannula  Body mass index is 25.53 kg/m².     Physical Exam  Constitutional:       General: She is not in acute distress.     Appearance: She is obese. She is ill-appearing (Chronic appearance). She is not toxic-appearing.      Comments: Generally weak   Cardiovascular:      Rate and Rhythm: Normal rate.      Heart sounds: Normal heart sounds.   Pulmonary:      Effort: Pulmonary effort is normal.      Comments: Diminished on expiration anteriorly  Abdominal:      General: Bowel sounds are normal.      Palpations: Abdomen is soft.      Tenderness: There is abdominal tenderness (Epigastric).      Comments: PEG tube site unremarkable   Musculoskeletal:      Right lower leg: No edema.      Left lower leg: No edema.   Skin:     General: Skin is warm and dry.   Neurological:      Mental Status: She is alert.      Comments: Right upper extremity and right lower extremity flaccid       Results Review     I reviewed the patient's new clinical results.  Results from last 7 days   Lab  Units 05/15/23  0529 05/13/23  0525 05/12/23  0636 05/10/23  0352   WBC 10*3/mm3 9.98 11.68* 9.38 11.81*   HEMOGLOBIN g/dL 12.2 12.2 12.4 12.2   PLATELETS 10*3/mm3 227 226 247 222     Results from last 7 days   Lab Units 05/15/23  0529 05/12/23  0636 05/10/23  0352 05/09/23  2045   SODIUM mmol/L 135* 136 133*  --    POTASSIUM mmol/L 4.1 4.9 4.2 4.6   CHLORIDE mmol/L 96* 97* 97*  --    CO2 mmol/L 27.0 22.8 21.3*  --    BUN mg/dL 36* 45* 32*  --    CREATININE mg/dL 0.70 0.75 0.52*  --    GLUCOSE mg/dL 150* 238* 183*  --    EGFR mL/min/1.73 94.9 87.4 102.0  --        Results from last 7 days   Lab Units 05/15/23  0529 05/12/23  0636 05/10/23  0352   CALCIUM mg/dL 8.8 9.8 9.3   MAGNESIUM mg/dL  --  2.3  --    PHOSPHORUS mg/dL  --  4.8*  --      Results from last 7 days   Lab Units 05/13/23  1043 05/12/23  0636   PROCALCITONIN ng/mL 0.22 0.18     Glucose   Date/Time Value Ref Range Status   05/16/2023 1211 186 (H) 70 - 130 mg/dL Final     Comment:     M[facility]: [id number] : [id number] [facility]   05/16/2023 0557 172 (H) 70 - 130 mg/dL Final     Comment:     Meter: [id number] : [id number] Jamshid Ray NA   05/16/2023 0012 176 (H) 70 - 130 mg/dL Final     Comment:     Meter: [id number] : [id number] Lashonda Staton NA   05/15/2023 1802 171 (H) 70 - 130 mg/dL Final     Comment:     Meter: [id number] : [id number] Juliano Munoz    05/15/2023 1144 212 (H) 70 - 130 mg/dL Final     Comment:     Meter: [id number] : [id number] Juliano Munoz NA   05/15/2023 0553 163 (H) 70 - 130 mg/dL Final     Comment:     Meter: [id number] : [id number] Patience Blackmon NA   05/14/2023 2354 164 (H) 70 - 130 mg/dL Final     Comment:     Meter: [id number] : [id number] Patience Blackmon NA       XR Chest PA & Lateral    Result Date: 5/15/2023  Suspected small bilateral pleural effusions.  This report was finalized on 5/15/2023 8:51 AM by Dr. Christopher Johnson M.D.      I have personally reviewed all medications:  Scheduled  Medications  acetaminophen, 1,000 mg, Oral, Once  amLODIPine, 10 mg, Per G Tube, Daily  castor oil-balsam peru, 1 application, Topical, Q12H  castor oil-balsam peru, 1 application, Topical, Q12H  chlorhexidine, 15 mL, Mouth/Throat, Q12H  cholestyramine, 1 packet, Oral, Q12H  famotidine, 20 mg, Oral, BID  furosemide, 20 mg, Oral, Daily  heparin (porcine), 5,000 Units, Subcutaneous, Q8H  insulin glargine, 45 Units, Subcutaneous, Daily  insulin regular, 4-24 Units, Subcutaneous, Q6H  ipratropium-albuterol, 3 mL, Nebulization, 4x Daily - RT  Menthol-Zinc Oxide, 1 application, Topical, BID  metoprolol tartrate, 75 mg, Oral, Q12H  mupirocin, 1 application, Topical, Q12H  nystatin, , Topical, Q12H  [START ON 5/17/2023] pantoprazole, 40 mg, Oral, Q AM  potassium chloride, 20 mEq, Oral, Daily  Scopolamine, 1 patch, Transdermal, Q72H  sodium chloride, 30 mL/kg, Intravenous, Once  sodium chloride, 500 mL, Intravenous, Once  sodium chloride, 10 mL, Intravenous, Q12H    Infusions   Diet  NPO Diet NPO Type: Strict NPO    I have personally reviewed:  [x]  Laboratory   []  Microbiology   [x]  Radiology   [x]  EKG/Telemetry  []  Cardiology/Vascular   []  Pathology    []  Records       Assessment/Plan     Active Hospital Problems    Diagnosis  POA   • **Sepsis, due to unspecified organism, unspecified whether acute organ dysfunction present [A41.9]  Yes   • UTI (urinary tract infection) due to urinary indwelling catheter [T83.511A, N39.0]  Unknown   • Pneumonia [J18.9]  Unknown   • Lactic acidosis [E87.20]  Unknown   • Type 2 diabetes mellitus with hyperglycemia [E11.65]  Unknown   • Dementia without behavioral disturbance [F03.90]  Unknown   • Delirium due to another medical condition [F05]  Unknown   • Metabolic encephalopathy [G93.41]  Yes   • Adrenal nodule [E27.8]  Unknown   • History of ischemic stroke [Z86.73]  Not Applicable   • PEG (percutaneous endoscopic gastrostomy) status [Z93.1]  Not Applicable      [facility]  Problems   No resolved problems to display.       68 y.o. female with history of dementia and previous stroke, type 2 diabetes mellitus, hypertension presented to [facility] with fever, chills, lethargy--intubated by EMS & required ICU admission on May 4 for acute respiratory failure with hypoxia due to pneumonia with subsequent sepsis as well as UTI.  Baseline living facility at nursing home.  Transferred from ICU and [facility] consulted for management of type 2 diabetes mellitus.  Unclear goals of care and issues with guardianship; therefore, plans for emergency guardianship hearing scheduled on May 18, 2023 at 1330.    Glucose trends acceptable with current regimen (Lantus 45 units subcu daily and high-dose correctional sliding scale every 6 hours and no plans for adjustment at this time.  Tube feeds tolerated per RN with loose stools as expected; therefore, plan Cholesytramine 1 packet twice daily.  Monitor output.    Epigastric tenderness described as dull to the best of patient's ability.  Abdomen does not appear acute.  Continue famotidine; however, plan to add Protonix for PUD PPx.    Status post antibiotic therapy for pneumonia and UTI.  Pulmonology approves discharge from their standpoint.  HOB > 30 at all times to reduce aspiration risk.    Patient appears medically stable for discharge to nursing home/return to [facility] on 5/16/2023; however, transportation unavailable until 5/17/2023 at 0900.    Patient seems most appropriate for palliative care given dementia and recent delirium with chronic right  hemiplegia due to previous CVA and requiring lifelong tube feeding /alternative nutrition route      · Heparin SC TID for DVT prophylaxis.  · CPR  · Discussed with patient & RN.   · Anticipate discharge [facility] on 5/17/2023--[facility]P following      MEMO Mendez  [facility]  05/16/23  12:50 EDT

## 2023-05-16 NOTE — PROGRESS NOTES
formerly [facility] INPATIENT PROGRESS NOTE         [address]    2023      PATIENT IDENTIFICATION:  Name: Shama Cruz ADMIT: 2023   : 1955  PCP: Archie Murray MD    MRN: [id number] LOS: 12 days   AGE/SEX: 68 y.o. female  ROOM: [facility]                     LOS 12    Reason for visit: Respiratory failure      SUBJECTIVE:      Resting comfortably.  Not requiring any supplemental oxygen.  No productive cough or chest pain complaints.  Diminished breath sounds but no wheezing or rhonchi on auscultation.      Objective   OBJECTIVE:    Vital Sign Min/Max for last 24 hours  Temp  Min: 97.3 °F (36.3 °C)  Max: 99 °F (37.2 °C)   BP  Min: 98/59  Max: 131/71   Pulse  Min: 76  Max: 89   Resp  Min: 16  Max: 20   SpO2  Min: 91 %  Max: 95 %   No data recorded   Weight  Min: 67.5 kg (148 lb 13 oz)  Max: 67.5 kg (148 lb 13 oz)                   FiO2 (%): 21 %     Body mass index is 25.53 kg/m².    Intake/Output Summary (Last 24 hours) at 2023 0737  Last data filed at 2023 0300  Gross per 24 hour   Intake --   Output 850 ml   Net -850 ml         Exam:  GEN:  No distress, appears stated age.  Confused  EYES:   PERRL, anicteric sclerae  ENT:    External ears/nose normal, OP clear  NECK:  No adenopathy, midline trachea  LUNGS: Normal chest on inspection, palpation and diminished breath sounds at bases on auscultation  CV:  Normal S1S2, without murmur  ABD:  Nontender, nondistended, no hepatosplenomegaly, +BS  EXT:  No edema.  No cyanosis or clubbing.  No mottling and normal cap refill.    Assessment     Scheduled meds:  acetaminophen, 1,000 mg, Oral, Once  amLODIPine, 10 mg, Per G Tube, Daily  castor oil-balsam peru, 1 application, Topical, Q12H  castor oil-balsam peru, 1 application, Topical, Q12H  chlorhexidine, 15 mL, Mouth/Throat, Q12H  famotidine, 20 mg, Oral, BID  furosemide, 20 mg, Oral, Daily  heparin (porcine), 5,000 Units, Subcutaneous, Q8H  insulin glargine, 45 Units, Subcutaneous,  Daily  insulin regular, 4-24 Units, Subcutaneous, Q6H  ipratropium-albuterol, 3 mL, Nebulization, 4x Daily - RT  Menthol-Zinc Oxide, 1 application, Topical, BID  metoprolol tartrate, 75 mg, Oral, Q12H  mupirocin, 1 application, Topical, Q12H  nystatin, , Topical, Q12H  potassium chloride, 20 mEq, Oral, Daily  Scopolamine, 1 patch, Transdermal, Q72H  sodium chloride, 30 mL/kg, Intravenous, Once  sodium chloride, 500 mL, Intravenous, Once  sodium chloride, 10 mL, Intravenous, Q12H      IV meds:                         Data Review:  Results from last 7 days   Lab Units 05/15/23  0529 05/12/23  0636 05/10/23  0352 05/10/23  0352 05/09/23  2045   SODIUM mmol/L 135* 136  --  133*  --    POTASSIUM mmol/L 4.1 4.9  --  4.2 4.6   CHLORIDE mmol/L 96* 97*  --  97*  --    CO2 mmol/L 27.0 22.8  --  21.3*  --    BUN mg/dL 36* 45*  --  32*  --    CREATININE mg/dL 0.70 0.75  --  0.52*  --    GLUCOSE mg/dL 150* 238*   < > 183*  --    CALCIUM mg/dL 8.8 9.8  --  9.3  --     < > = values in this interval not displayed.         Estimated Creatinine Clearance: 73.6 mL/min (by C-G formula based on SCr of 0.7 mg/dL).  Results from last 7 days   Lab Units 05/15/23  0529 05/13/23  0525 05/12/23 0636 05/10/23  0352   WBC 10*3/mm3 9.98 11.68* 9.38 11.81*   HEMOGLOBIN g/dL 12.2 12.2 12.4 12.2   PLATELETS 10*3/mm3 227 226 247 222             Results from last 7 days   Lab Units 05/12/23  0953   PH, ARTERIAL pH units 7.367   PO2 ART mm Hg 99.9   PCO2, ARTERIAL mm Hg 43.3   HCO3 ART mmol/L 24.9     Results from last 7 days   Lab Units 05/13/23  1043 05/12/23 0636   PROCALCITONIN ng/mL 0.22 0.18         Glucose   Date/Time Value Ref Range Status   05/16/2023 0557 172 (H) 70 - 130 mg/dL Final     Comment:     Meter: [id number] : [id number] Jamshid STRONG   05/16/2023 0012 176 (H) 70 - 130 mg/dL Final     Comment:     Meter: [id number] : [id number] Lashonda STRONG   05/15/2023 1802 171 (H) 70 - 130 mg/dL Final     Comment:     Meter:  [id number] : [id number] Juliano STRONG   05/15/2023 1144 212 (H) 70 - 130 mg/dL Final     Comment:     Meter: [id number] : [id number] Juliano Munoz NA   05/15/2023 0553 163 (H) 70 - 130 mg/dL Final     Comment:     Meter: [id number] : [id number] Patience Blackmon NA   05/14/2023 2354 164 (H) 70 - 130 mg/dL Final     Comment:     Meter: [id number] : [id number] Patience Blackmon NA   05/14/2023 1623 167 (H) 70 - 130 mg/dL Final     Comment:     Meter: [id number] : [id number] Foreign STRONG       Chest x-ray 5/15 shows small pleural effusions.      Microbiology reviewed              Active Hospital Problems    Diagnosis  POA   • **Sepsis, due to unspecified organism, unspecified whether acute organ dysfunction present [A41.9]  Yes   • UTI (urinary tract infection) due to urinary indwelling catheter [T83.511A, N39.0]  Unknown   • Pneumonia [J18.9]  Unknown   • Lactic acidosis [E87.20]  Unknown   • Type 2 diabetes mellitus with hyperglycemia [E11.65]  Unknown   • Dementia without behavioral disturbance [F03.90]  Unknown   • Delirium due to another medical condition [F05]  Unknown   • Metabolic encephalopathy [G93.41]  Yes   • Adrenal nodule [E27.8]  Unknown   • History of ischemic stroke [Z86.73]  Not Applicable   • PEG (percutaneous endoscopic gastrostomy) status [Z93.1]  Not Applicable      Resolved Hospital Problems   No resolved problems to display.         ASSESSMENT:  Acute hypoxemic respiratory failure  Sepsis  Pneumonia  UTI  Lactic acidosis  Diabetes  Dementia  Status post PEG tube  3 cm indeterminate right adrenal nodule  Stridor: Resolved  History of previous stroke          PLAN:  Making good progress.  On room air.  Discontinued Mucomyst.  Status post antibiotic course.   Keep head of the bed elevated for aspiration risk.  Back to nursing home likely soon.  No objection from my standpoint.        Mega Malloy MD  Pulmonary and Critical Care Medicine  [facility],  [facility]  5/16/2023    07:37 EDT

## 2023-05-16 NOTE — CASE MANAGEMENT/SOCIAL WORK
Continued Stay Note  [facility]     Patient Name: Shama Cruz  MRN: [id number]  Today's Date: 5/16/2023    Admit Date: 5/4/2023    Plan: [facility]   Discharge Plan       Row Name 05/16/23 1213       Plan    Plan Comments Emergency Guardianship hearing set for May 18, 2023 at 1:30 pm.  Notification delivered to [facility] as well.  Jeri HERNANDEZ      Row Name 05/16/23 1120       Plan    Plan [facility]                   Discharge Codes    No documentation.                 Expected Discharge Date and Time       Expected Discharge Date Expected Discharge Time    May 16, 2023               Jeri Otto RN

## 2023-05-16 NOTE — PROGRESS NOTES
"Nutrition Services    Patient Name:  Shama Cruz  YOB: 1955  MRN: [id number]  Admit Date:  5/4/2023  Assessment Date:  05/16/23    CLINICAL NUTRITION ASSESSMENT    Comments: TF follow up    Pt tolerating Diabetisource AC @ 65ml/hr and Gurmeet bid and water at [id number] via PEG.Moderate soft BM 5/15. Labs, meds, skin reviewed. Na+ 135 yesterday. No new Na+ level today. Glucoses 172-186 today. Continue current TF regimen.     RD to follow clinical course and make further recommendations as warranted.     Encounter Information         Reason for Encounter TF follow up    Admitting Diagnosis Sepsis, acute hypoxic respiratory failure, hypernatremia, dementia, hx/o diabetes        Current Issues extubated. PEG tube in place.      Current Nutrition Orders & Evaluation of Intake       Oral Nutrition     Food Allergies NFFA   Current PO Diet NPO Diet NPO Type: Strict NPO   Supplement    PO Evaluation NPO     % PO Intake       Enteral Nutrition     Enteral Route PEG    TF Delivery Method Continuous    Enteral Product Diabetisource AC    Modular Gurmeet, BID    Propofol Rate/Kcal     TF Current Rate (mL) 65    TF Goal Rate (mL) 65    Current Water Flush (mL) 50mL q 4 hrs    Current TF Provision  1872kcal, 94 gm protein, 1279 mL free water + 500 mL water flushes         Calories 100 % needs met         Protein  100 % needs met         TF Fluid (mL) 1779mL         IV Fluids 0    Avg Volume Delivered (mL) 1286mL    % Goal Volume 82%    TF Residual  no or minimal residual    TF Changes none    TF Tolerance tolerating     Anthropometrics          Height    Weight Height: 162.6 cm (64.02\")  Weight: 67.5 kg (148 lb 13 oz) (05/16/23 0515)    BMI kg/m2 Body mass index is 25.53 kg/m².    BMI Category Overweight (25 - 29.9)    Weight History  Wt Readings from Last 15 Encounters:   05/16/23 0515 67.5 kg (148 lb 13 oz)   05/15/23 0500 64 kg (141 lb 1.5 oz)   05/14/23 0507 63.9 kg (140 lb 14 oz)   05/13/23 0619 63.7 kg (140 " lb 6.9 oz)   05/12/23 0300 67 kg (147 lb 11.3 oz)   05/11/23 0634 72 kg (158 lb 11.7 oz)   05/10/23 0500 70.6 kg (155 lb 10.3 oz)   05/09/23 0546 73.1 kg (161 lb 2.5 oz)   05/08/23 0600 71.1 kg (156 lb 12 oz)   05/08/23 0300 71.1 kg (156 lb 12 oz)   05/07/23 0443 70.1 kg (154 lb 8.7 oz)   05/06/23 0400 69.5 kg (153 lb 3.5 oz)   05/05/23 0600 68.4 kg (150 lb 12.7 oz)   05/04/23 2236 68.4 kg (150 lb 12.7 oz)   05/04/23 0454 90.7 kg (200 lb)        Estimated/Assessed Needs        Energy Requirements    Weight for Calculation 68.4kg   Method for Estimation  25 kcal/kg, 30 kcal/kg   EST Needs (kcal/day) 9357-6885        Protein Requirements    Weight for Calculation 68.4kg    EST Protein Needs (g/kg) 1.2 - 1.5 gm/kg   EST Daily Needs (g/day) 82-103g        Fluid Requirements     Method for Estimation 1 mL/kcal    Estimated Needs (mL/day) 1710         Physical Findings          Physical Appearance obese, overweight, on oxygen therapy, sedate   Oral/Mouth Cavity tooth or teeth missing   Edema  generalized, 3+ (moderate)   Gastrointestinal diarrhea, last bowel movement: 5/15 moderate   Skin  pressure injury coccyx, heel, right ear   Tubes/Drains/Lines PEG   NFPE No clinical signs of muscle wasting or fat loss      Labs        Pertinent Labs Reviewed, listed below     Results from last 7 days   Lab Units 05/15/23  0529 05/12/23  0636 05/10/23  0352   SODIUM mmol/L 135* 136 133*   POTASSIUM mmol/L 4.1 4.9 4.2   CHLORIDE mmol/L 96* 97* 97*   CO2 mmol/L 27.0 22.8 21.3*   BUN mg/dL 36* 45* 32*   CREATININE mg/dL 0.70 0.75 0.52*   CALCIUM mg/dL 8.8 9.8 9.3   GLUCOSE mg/dL 150* 238* 183*     Results from last 7 days   Lab Units 05/15/23  0529 05/13/23  0525 05/12/23  0636   MAGNESIUM mg/dL  --   --  2.3   PHOSPHORUS mg/dL  --   --  4.8*   HEMOGLOBIN g/dL 12.2   < > 12.4   HEMATOCRIT % 34.3   < > 36.8   WBC 10*3/mm3 9.98   < > 9.38    < > = values in this interval not displayed.     Results from last 7 days   Lab Units  05/15/23  0529 05/13/23  0525 05/12/23  0636 05/10/23  0352   PLATELETS 10*3/mm3 227 226 247 222     No results found for: COVID19  No results found for: HGBA1C       Medications            Scheduled Medications acetaminophen, 1,000 mg, Oral, Once  amLODIPine, 10 mg, Per G Tube, Daily  castor oil-balsam peru, 1 application, Topical, Q12H  castor oil-balsam peru, 1 application, Topical, Q12H  chlorhexidine, 15 mL, Mouth/Throat, Q12H  cholestyramine, 1 packet, Oral, Q12H  famotidine, 20 mg, Oral, BID  furosemide, 20 mg, Oral, Daily  heparin (porcine), 5,000 Units, Subcutaneous, Q8H  insulin glargine, 45 Units, Subcutaneous, Daily  insulin regular, 4-24 Units, Subcutaneous, Q6H  ipratropium-albuterol, 3 mL, Nebulization, 4x Daily - RT  Menthol-Zinc Oxide, 1 application, Topical, BID  metoprolol tartrate, 75 mg, Oral, Q12H  mupirocin, 1 application, Topical, Q12H  nystatin, , Topical, Q12H  [START ON 5/17/2023] pantoprazole, 40 mg, Oral, Q AM  potassium chloride, 20 mEq, Oral, Daily  Scopolamine, 1 patch, Transdermal, Q72H  sodium chloride, 30 mL/kg, Intravenous, Once  sodium chloride, 500 mL, Intravenous, Once  sodium chloride, 10 mL, Intravenous, Q12H        Infusions      PRN Medications •  dextrose  •  dextrose  •  glucagon (human recombinant)  •  guaifenesin  •  labetalol  •  loperamide  •  Potassium Replacement - Follow Nurse / BPA Driven Protocol  •  sodium chloride  •  sodium chloride     PES STATEMENT / NUTRITION DIAGNOSIS      Nutrition Dx Problem  Problem: Needs Alternative Route  Etiology: Medical Diagnosis Dysphagia, Resp failure/vent  Signs/Symptoms: NPO    Comment: PEG tube in place     PLAN OF CARE  Intervention Goal        Intervention Goal(s) Maintain nutrition status, Nutrition support treatment, Meet estimated needs, Disease management/therapy, Maintain TF/PN, Tolerate TF/PN at goal and No significant weight loss     Nutrition Intervention         RD Action Follow Tx Progress and Care plan reviewed      Nutrition Prescription          Recommendation       Prescription Ordered No changes at this time     Monitor/Evaluation        Monitor Per protocol     RD to follow up per protocol.    Electronically signed by:  Meg Zhao RD  05/16/23 14:18 EDT

## 2023-05-17 VITALS
BODY MASS INDEX: 23.37 KG/M2 | HEIGHT: 64 IN | TEMPERATURE: 97.5 F | RESPIRATION RATE: 17 BRPM | DIASTOLIC BLOOD PRESSURE: 65 MMHG | SYSTOLIC BLOOD PRESSURE: 106 MMHG | HEART RATE: 81 BPM | WEIGHT: 136.91 LBS | OXYGEN SATURATION: 93 %

## 2023-05-17 LAB
ANION GAP SERPL CALCULATED.3IONS-SCNC: 12.3 MMOL/L (ref 5–15)
BUN SERPL-MCNC: 32 MG/DL (ref 8–23)
BUN/CREAT SERPL: 54.2 (ref 7–25)
CALCIUM SPEC-SCNC: 10.2 MG/DL (ref 8.6–10.5)
CHLORIDE SERPL-SCNC: 95 MMOL/L (ref 98–107)
CO2 SERPL-SCNC: 27.7 MMOL/L (ref 22–29)
CREAT SERPL-MCNC: 0.59 MG/DL (ref 0.57–1)
DEPRECATED RDW RBC AUTO: 55.3 FL (ref 37–54)
EGFRCR SERPLBLD CKD-EPI 2021: 98.9 ML/MIN/1.73
ERYTHROCYTE [DISTWIDTH] IN BLOOD BY AUTOMATED COUNT: 15.2 % (ref 12.3–15.4)
GLUCOSE BLDC GLUCOMTR-MCNC: 167 MG/DL (ref 70–130)
GLUCOSE SERPL-MCNC: 156 MG/DL (ref 65–99)
HCT VFR BLD AUTO: 36 % (ref 34–46.6)
HGB BLD-MCNC: 12.8 G/DL (ref 12–15.9)
MCH RBC QN AUTO: 35.2 PG (ref 26.6–33)
MCHC RBC AUTO-ENTMCNC: 35.6 G/DL (ref 31.5–35.7)
MCV RBC AUTO: 98.9 FL (ref 79–97)
PLATELET # BLD AUTO: 202 10*3/MM3 (ref 140–450)
PMV BLD AUTO: 12.2 FL (ref 6–12)
POTASSIUM SERPL-SCNC: 3.9 MMOL/L (ref 3.5–5.2)
RBC # BLD AUTO: 3.64 10*6/MM3 (ref 3.77–5.28)
SODIUM SERPL-SCNC: 135 MMOL/L (ref 136–145)
WBC NRBC COR # BLD: 10.25 10*3/MM3 (ref 3.4–10.8)

## 2023-05-17 PROCEDURE — 94761 N-INVAS EAR/PLS OXIMETRY MLT: CPT

## 2023-05-17 PROCEDURE — 94668 MNPJ CHEST WALL SBSQ: CPT

## 2023-05-17 PROCEDURE — 80048 BASIC METABOLIC PNL TOTAL CA: CPT | Performed by: NURSE PRACTITIONER

## 2023-05-17 PROCEDURE — 63710000001 INSULIN REGULAR HUMAN PER 5 UNITS: Performed by: INTERNAL MEDICINE

## 2023-05-17 PROCEDURE — 94664 DEMO&/EVAL PT USE INHALER: CPT

## 2023-05-17 PROCEDURE — 85027 COMPLETE CBC AUTOMATED: CPT | Performed by: NURSE PRACTITIONER

## 2023-05-17 PROCEDURE — 94799 UNLISTED PULMONARY SVC/PX: CPT

## 2023-05-17 PROCEDURE — 82948 REAGENT STRIP/BLOOD GLUCOSE: CPT

## 2023-05-17 PROCEDURE — 25010000002 HEPARIN (PORCINE) PER 1000 UNITS: Performed by: INTERNAL MEDICINE

## 2023-05-17 RX ORDER — GUAIFENESIN 200 MG/10ML
200 LIQUID ORAL EVERY 4 HOURS PRN
Start: 2023-05-17

## 2023-05-17 RX ORDER — LOPERAMIDE HCL 1 MG/7.5ML
2 SOLUTION ORAL 4 TIMES DAILY PRN
Start: 2023-05-17

## 2023-05-17 RX ORDER — METOPROLOL TARTRATE 75 MG/1
75 TABLET, FILM COATED ORAL EVERY 12 HOURS SCHEDULED
Start: 2023-05-17

## 2023-05-17 RX ORDER — FUROSEMIDE 20 MG/1
20 TABLET ORAL DAILY
Start: 2023-05-17

## 2023-05-17 RX ORDER — NYSTATIN 100000 [USP'U]/G
POWDER TOPICAL EVERY 12 HOURS SCHEDULED
Start: 2023-05-17

## 2023-05-17 RX ORDER — CHOLESTYRAMINE 4 G/9G
1 POWDER, FOR SUSPENSION ORAL EVERY 12 HOURS SCHEDULED
Start: 2023-05-17

## 2023-05-17 RX ORDER — IPRATROPIUM BROMIDE AND ALBUTEROL SULFATE 2.5; .5 MG/3ML; MG/3ML
3 SOLUTION RESPIRATORY (INHALATION)
Qty: 360 ML
Start: 2023-05-17

## 2023-05-17 RX ADMIN — CASTOR OIL AND BALSAM, PERU 1 APPLICATION: 788; 87 OINTMENT TOPICAL at 08:18

## 2023-05-17 RX ADMIN — CHOLESTYRAMINE 1 PACKET: 4 POWDER, FOR SUSPENSION ORAL at 08:17

## 2023-05-17 RX ADMIN — INSULIN GLARGINE-YFGN 45 UNITS: 100 INJECTION, SOLUTION SUBCUTANEOUS at 08:18

## 2023-05-17 RX ADMIN — NYSTATIN: 100000 POWDER TOPICAL at 08:18

## 2023-05-17 RX ADMIN — MUPIROCIN 1 APPLICATION: 20 OINTMENT TOPICAL at 08:19

## 2023-05-17 RX ADMIN — AMLODIPINE BESYLATE 10 MG: 10 TABLET ORAL at 08:18

## 2023-05-17 RX ADMIN — POTASSIUM CHLORIDE 20 MEQ: 1.5 POWDER, FOR SOLUTION ORAL at 08:17

## 2023-05-17 RX ADMIN — METOPROLOL TARTRATE 75 MG: 25 TABLET, FILM COATED ORAL at 08:17

## 2023-05-17 RX ADMIN — IPRATROPIUM BROMIDE AND ALBUTEROL SULFATE 3 ML: 2.5; .5 SOLUTION RESPIRATORY (INHALATION) at 07:46

## 2023-05-17 RX ADMIN — INSULIN HUMAN 4 UNITS: 100 INJECTION, SOLUTION PARENTERAL at 06:26

## 2023-05-17 RX ADMIN — HEPARIN SODIUM 5000 UNITS: 5000 INJECTION INTRAVENOUS; SUBCUTANEOUS at 06:26

## 2023-05-17 RX ADMIN — ANORECTAL OINTMENT 1 APPLICATION: 15.7; .44; 24; 20.6 OINTMENT TOPICAL at 08:18

## 2023-05-17 RX ADMIN — FUROSEMIDE 20 MG: 20 TABLET ORAL at 08:18

## 2023-05-17 RX ADMIN — Medication 10 ML: at 08:19

## 2023-05-17 NOTE — DISCHARGE SUMMARY
Patient Name: Shama Cruz  : 1955  MRN: [id number]    Date of Admission: 2023  Date of Discharge:  2023  Primary Care Physician: Archie Murray MD      Chief Complaint:   Respiratory Distress      Discharge Diagnoses     Active Hospital Problems    Diagnosis  POA   • **Sepsis, due to unspecified organism, unspecified whether acute organ dysfunction present [A41.9]  Yes   • UTI (urinary tract infection) due to urinary indwelling catheter [T83.511A, N39.0]  Unknown   • Pneumonia [J18.9]  Unknown   • Lactic acidosis [E87.20]  Unknown   • Type 2 diabetes mellitus with hyperglycemia [E11.65]  Unknown   • Dementia without behavioral disturbance [F03.90]  Unknown   • Delirium due to another medical condition [F05]  Unknown   • Metabolic encephalopathy [G93.41]  Yes   • Adrenal nodule [E27.8]  Unknown   • History of ischemic stroke [Z86.73]  Not Applicable   • PEG (percutaneous endoscopic gastrostomy) status [Z93.1]  Not Applicable      Resolved Hospital Problems   No resolved problems to display.        Hospital Course     Ms. Cruz is a 68 y.o. female with a history of dementia, prior strokes, type 2 diabetes, hypertension who presented to [facility] initially complaining of fevers chills lethargy and altered mental status.  Please see the admitting history and physical for further details.  She was found to have metabolic encephalopathy secondary to pneumonia and UTI with severe acute respiratory failure and was admitted to the hospital for further evaluation and treatment.  She was quite ill on admission and required a stay in the ICU.  At baseline she was living in a nursing facility.  She was started on empiric antibiotics and blood pressure was supported.  She required significant pulmonary clearance and ultimately intubation.  She was extubated on May 8 and transferred out of the ICU and A evaluated the patient for uncontrolled diabetes.  Her blood sugar control  medications were slowly titrated up and is currently on Lantus 45 units daily.  She had history of significant oropharyngeal dysphagia and PEG tube been placed in the past.  Currently on tube feedings here in the hospital.  These have been adjusted by nutrition here.  The patient's been unable to really participate in her goals of care.  She remains somewhat lethargic at times.  She has shown significant improvement over the last few days.  She is completed antibiotic therapy and at this point is stable to discharge back to the skilled nursing facility.  Emergency guardianship paperwork has been filled out and she has a hearing set for tomorrow.  The nursing facility is aware of this.  It is recommended that her physician at the nursing home follow-up with her in the next 2 to 3 days to evaluate her respiratory status ensure she continues to improve.  Otherwise at this point she is reached stable status to discharge back to skilled nursing facility for ongoing care.    She does incidentally note to have adrenal nodule that can be followed up in the outpatient setting on additional imaging.  Day of Discharge     Subjective:  Unable to assess review of systems    Physical Exam:  Temp:  [97.5 °F (36.4 °C)-98.8 °F (37.1 °C)] 97.5 °F (36.4 °C)  Heart Rate:  [67-86] 81  Resp:  [16-18] 17  BP: ()/(55-65) 106/65  Body mass index is 23.49 kg/m².  Physical Exam     Appearance: Normal appearance. She is chronically ill-appearing.   HENT:      Head: Normocephalic and atraumatic.   Cardiovascular:      Rate and Rhythm: Normal rate and regular rhythm.   Pulmonary:      Effort: Pulmonary effort is normal. No respiratory distress.      Breath sounds: Improving  Abdominal:      General: Bowel sounds are normal. There is distension.      Palpations: Abdomen is soft.      Comments: Noted bruising over the lower abdomen PEG in good position no erythema or signs of irritation or infection   Neurological:      Mental Status: She is  alert. She is disoriented.      Comments: Lethargic but more awake  Consultants     Consult Orders (all) (From admission, onward)     Start     Ordered    05/13/23 0928  Inpatient Hospitalist Consult  Once        Specialty:  Hospitalist  Provider:  hCeco Burns MD    05/13/23 0927 05/04/23 1157  Inpatient Case Management  Consult  Once        Provider:  (Not yet assigned)    05/04/23 1156    05/04/23 1147  Inpatient Nutrition Consult  Once        Provider:  (Not yet assigned)    05/04/23 1147    05/04/23 0508  Pulmonology (on-call MD unless specified)  Once        Specialty:  Pulmonary Disease  Provider:  (Not yet assigned)    05/04/23 0507              Procedures     * Surgery not found *      Imaging Results (All)     Procedure Component Value Units Date/Time    XR Chest PA & Lateral [[id number]] Collected: 05/15/23 0837     Updated: 05/15/23 0854    Narrative:      PA AND LATERAL CHEST X-RAY     HISTORY: Respiratory distress.     Chest x-ray consisting of two images is provided. Correlation:  05/13/2023.     FINDINGS: The cardiomediastinal silhouette is normal. The lungs appear  clear but on the lateral view we see density posteriorly which appears  to be posteriorly layering pleural effusion, small. No pneumothorax.       Impression:      Suspected small bilateral pleural effusions.     This report was finalized on 5/15/2023 8:51 AM by Dr. Christopher Johnson M.D.       XR Chest 1 View [[id number]] Collected: 05/13/23 0528     Updated: 05/13/23 0532    Narrative:      SINGLE VIEW OF THE CHEST     HISTORY: Respiratory failure     COMPARISON: 05/12/2023     FINDINGS:  Cardiac silhouette is stable. There is increasing consolidation at the  left lung base. There is a left pleural effusion. Right lung appears  clear. There is extensive calcification of the aorta. Patient appears to  have a gastrostomy tube.       Impression:      Worsening left basilar consolidation.     This report was finalized on  5/13/2023 5:29 AM by Dr. Marisol Quinones M.D.       XR Chest 1 View [[id number]] Collected: 05/12/23 1650     Updated: 05/12/23 1657    Narrative:      PORTABLE CHEST     HISTORY: Hypoxia.     COMPARISON: 05/07/2023.     FINDINGS: The patient has been extubated. There is increased density  appreciated involving the left hemithorax and volume loss on the left  with mediastinal shift to the left. This may be secondary to a mucous  plug. There is no evidence of consolidation. Underlying infiltrate or  pneumonia cannot be excluded. The above information was immediately  called to the patient's nurse at the time of dictation. The patient's  nurse is to immediately relay the information to the clinical service.     This report was finalized on 5/12/2023 4:54 PM by Dr. Ruben Delvalle M.D.       XR Chest 1 View [[id number]] Collected: 05/07/23 0714     Updated: 05/07/23 0714    Narrative:        Patient: JUANY GALLEGOS  Time Out: 07:14  Exam(s): XR CXR 1 VIEW     EXAM:    XR Chest, 1 View    CLINICAL HISTORY:     Reason for exam: Intubated Patient.    TECHNIQUE:    Frontal view of the chest.    COMPARISON:    5 6 2023    FINDINGS:    Lungs:  Unremarkable.  No consolidation.    Pleural space:  Unremarkable.  No pneumothorax.    Heart:  Unremarkable.  No cardiomegaly.    Mediastinum: Endotracheal tube terminates 6 mm above the level of the   anahi.    Bones joints:  Unremarkable.    IMPRESSION:   1. Stable endotracheal tube.    2. No new consolidations.    Impression:          Electronically signed by John Hernandez M.D. on 05-07-23 at 0714    XR Chest 1 View [[id number]] Collected: 05/06/23 0915     Updated: 05/06/23 0915    Narrative:        Patient: ROSY JUANY  Time Out: 09:14  Exam(s): XR CXR 1 VIEW     EXAM:    XR Chest, 1 View    CLINICAL HISTORY:     Reason for exam: Intubated Patient.    TECHNIQUE:    Frontal view of the chest.    COMPARISON:  Chest x-ray May 5, 2023    IMPRESSION:       1.  Endotracheal  tube terminates 2.2 cm above the anahi.  2.  Unchanged mild interstitial changes in the lungs.  Findings may be   due to mild interstitial pulmonary edema.    Impression:          Electronically signed by Andre Singletary MD on 05-06-23 at 0914    XR Chest 1 View [[id number]] Collected: 05/05/23 1154     Updated: 05/05/23 1158    Narrative:      XR CHEST 1 VW-clinical: Endotracheal tube placement     COMPARISON examination 05/05/2023 at 0431 hours, current examination  1120 hours     FINDINGS: Position of the endotracheal tube is similar to the previous  examination. The tip appears approximately 2 cm above the anahi. There  is satisfactory aeration of both lungs. No effusion, edema or acute  airspace disease has developed. The cardiomediastinal silhouette is  stable. Biapical pleural thickening, greater on the left than the right.     CONCLUSION: Stable chest     This report was finalized on 5/5/2023 11:55 AM by Dr. Elliot Angeles M.D.       XR Chest 1 View [[id number]] Collected: 05/05/23 0754     Updated: 05/05/23 0757    Narrative:      XR CHEST 1 VW-     Clinical: Ventilator management, intubated patient     COMPARISON examination 05/04/2023     FINDINGS: The patient is considerably rotated towards the right, the  endotracheal tube position is stable and satisfactory. Lungs clear. No  effusion or edema. Mediastinum is stable in appearance allowing for  rotational factors. The remainder is unremarkable.     CONCLUSION: Allowing for patient rotation, no interval change since  05/04/2023.     This report was finalized on 5/5/2023 7:54 AM by Dr. Elliot Angeles M.D.       CT Head Without Contrast [[id number]] Collected: 05/04/23 0747     Updated: 05/04/23 1305    Narrative:      CT HEAD WITHOUT CONTRAST     CLINICAL HISTORY: Altered mental status. Sepsis.     TECHNIQUE: CT scan of the head was obtained with 3 mm axial soft tissue  algorithm images. No intravenous contrast was administered. Sagittal and  coronal  reconstructions were obtained.     COMPARISON: No previous similar studies are available for comparison.     FINDINGS:       There is a 4 mm  density within the expected location of the left ICA  terminus that I suspect is representative of an intra-saccular flow  diverter or coils at the site of a treated aneurysm, although  correlation with historical data is suggested. There are extensive areas  of encephalomalacia involving the lateral aspect left frontal, parietal  temporal, and occipital lobes in addition to the left insular cortex  compatible with extensive areas of chronic infarction within the left  MCA distribution. These foci of encephalomalacia measure up to  approximately 9.7 x 3.4 cm in greatest axial dimensions. Additionally,  there is a focus of encephalomalacia posterior limb and genu of the left  internal capsule compatible with a chronic infarct within the left  anterior choroidal artery distribution. This measures up to  approximately 2.8 x 1.1 cm in greatest axial dimensions. A chronic  infarct is also noted within the left cerebellar hemisphere, this is  within the left PICA distribution measuring up to 1.2 x 0.8 cm in  greatest axial dimensions. There are moderate-to-severe changes of  chronic small vessel ischemic phenomena. There is ex vacuo dilatation of  the left lateral and third ventricle. However, there is no convincing  evidence to suggest an acute intracranial abnormality.     Atherosclerotic calcifications are incidentally noted within the  intracranial vasculature.     Incidental note is made of partial opacification of the right mastoid  air cells. Mucosal thickening is seen within the ethmoid air cells.  There is a mucous retention cyst or secretions within the sphenoid  sinus.       Impression:         There is no convincing CT evidence for acute intracranial pathology.  Further evaluation for more sensitive and specific detection of  intracranial pathology could be performed  with MR imaging, as clinically  indicated.     Otherwise, there are findings compatible with large foci of chronic  infarction within the left MCA distribution within the lateral aspect  left frontal, parietal, temporal, and occipital lobes in addition to the  left insular cortex. A chronic infarct is seen within the posterior limb  and genu of the left internal capsule within the left anterior choroidal  artery distribution. Also, there is a small chronic infarct within left  cerebellar hemisphere within left PICA distribution. Moderate-to-severe  changes of chronic small vessel ischemic phenomena are noted.     There is a 4 mm density at the expected location of the left ICA  terminus which I suspect is representative of an intra-saccular flow  diverter or coils at the site of an endovascularly treated aneurysm.  However, please correlate with historical data.     Incidental extracranial findings are as discussed above.        Radiation dose reduction techniques were utilized, including automated  exposure control and exposure modulation based on body size.     This report was finalized on 5/4/2023 1:02 PM by Dr. Xu Remy M.D.       CT Angiogram Chest Pulmonary Embolism [[id number]] Collected: 05/04/23 0809     Updated: 05/04/23 0817    Narrative:      CT ANGIOGRAM OF THE CHEST     HISTORY: Sepsis, respiratory distress     TECHNIQUE: Radiation dose reduction techniques were utilized, including  automated exposure control and exposure modulation based on body size.   CT angiogram of the chest was performed following the administration of  IV contrast. Multiple coronal, sagittal, and 3-D reconstruction images  were obtained. PE protocol.     COMPARISON: Chest x-ray 05/04/2023     FINDINGS: There is no evidence for pulmonary embolism. There is no  lymphadenopathy or pleural effusion. Trace amount of pericardial fluid.  Coronary artery calcification. Endotracheal tube in place. There is some  bronchial wall  thickening and mucous plugging in the lower lobes, right  greater than left. There is mild atelectasis/consolidation in the bases  of both lower lobes, right greater than left. Limited imaging of the  upper abdomen demonstrates a PEG tube in place. 3 cm indeterminate right  adrenal nodule. Punctate renal stones. No acute bony abnormality       Impression:      1. No evidence of pulmonary embolism  2. Bronchial wall thickening and mucous plugging within the lower lobes,  right greater than left  3. Mild atelectasis/consolidation in the bases of both lower lobes,  right greater than left  4. 3 cm indeterminate right adrenal nodule. This could be further  evaluated with CT of abdomen with and without contrast adrenal protocol  when the patient's clinical condition allows              Radiation dose reduction techniques were utilized, including automated  exposure control and exposure modulation based on body size.     This report was finalized on 5/4/2023 8:14 AM by Dr. Govind Greenwood M.D.       XR Chest 1 View [[id number]] Collected: 05/04/23 0307     Updated: 05/04/23 0311    Narrative:      SINGLE VIEW OF THE CHEST     HISTORY: Respiratory distress     COMPARISON: None available.     FINDINGS:  Endotracheal tube terminates in satisfactory position. No pneumothorax  or pleural effusion is seen. There is diffuse interstitial prominence.  There may be some mild bibasilar atelectasis.       Impression:      Endotracheal tube terminates in satisfactory position.     This report was finalized on 5/4/2023 3:08 AM by Dr. Marisol Quinones M.D.               Pertinent Labs     Results from last 7 days   Lab Units 05/17/23  0604 05/15/23  0529 05/13/23  0525 05/12/23  0636   WBC 10*3/mm3 10.25 9.98 11.68* 9.38   HEMOGLOBIN g/dL 12.8 12.2 12.2 12.4   PLATELETS 10*3/mm3 202 227 226 247     Results from last 7 days   Lab Units 05/17/23  0604 05/15/23  0529 05/12/23  0636   SODIUM mmol/L 135* 135* 136   POTASSIUM mmol/L 3.9 4.1  4.9   CHLORIDE mmol/L 95* 96* 97*   CO2 mmol/L 27.7 27.0 22.8   BUN mg/dL 32* 36* 45*   CREATININE mg/dL 0.59 0.70 0.75   GLUCOSE mg/dL 156* 150* 238*   EGFR mL/min/1.73 98.9 94.9 87.4       Results from last 7 days   Lab Units 05/17/23  0604 05/15/23  0529 05/12/23  0636   CALCIUM mg/dL 10.2 8.8 9.8   MAGNESIUM mg/dL  --   --  2.3   PHOSPHORUS mg/dL  --   --  4.8*               Invalid input(s): LDLCALC          Test Results Pending at Discharge       Discharge Details        Discharge Medications      New Medications      Instructions Start Date   cholestyramine 4 g packet  Commonly known as: QUESTRAN   1 packet, Oral, Every 12 Hours Scheduled      furosemide 20 MG tablet  Commonly known as: LASIX   20 mg, Oral, Daily      guaifenesin 100 MG/5ML liquid  Commonly known as: ROBITUSSIN   200 mg, Oral, Every 4 Hours PRN      ipratropium-albuterol 0.5-2.5 mg/3 ml nebulizer  Commonly known as: DUO-NEB   3 mL, Nebulization, 4 Times Daily - RT      lansoprazole 3 mg/mL in sodium bicarbonate injection 8.4%   30 mg, Oral, Daily      loperamide 1 MG/7.5ML suspension  Commonly known as: IMODIUM A-D   2 mg, Oral, 4 Times Daily PRN      nystatin 805219 UNIT/GM powder  Commonly known as: MYCOSTATIN   Topical, Every 12 Hours Scheduled         Changes to Medications      Instructions Start Date   insulin glargine 100 UNIT/ML injection  Commonly known as: LANTUS, SEMGLEE  What changed:   · medication strength  · how much to take  · when to take this   45 Units, Subcutaneous, Daily      Metoprolol Tartrate 75 MG tablet  What changed:   · medication strength  · how much to take  · how to take this  · when to take this   75 mg, Oral, Every 12 Hours Scheduled         Continue These Medications      Instructions Start Date   acetaminophen 325 MG tablet  Commonly known as: TYLENOL   650 mg, Per G Tube, Every 6 Hours PRN      amLODIPine 10 MG tablet  Commonly known as: NORVASC   10 mg, Per G Tube, Daily      atorvastatin 80 MG  tablet  Commonly known as: LIPITOR   80 mg, Per G Tube, Daily      Cholecalciferol liquid   800 Units, Per G Tube, Daily      fish oil 1000 MG capsule capsule   2,000 mg, Oral, 2 Times Daily      potassium chloride ER 20 MEQ tablet controlled-release ER tablet  Commonly known as: K-TAB   20 mEq, Daily         Stop These Medications    ascorbic acid 500 MG tablet  Commonly known as: VITAMIN C     aspirin 81 MG chewable tablet     donepezil 5 MG tablet  Commonly known as: ARICEPT     lactulose 10 GM/15ML solution  Commonly known as: CHRONULAC     MEDIHONEY WOUND/BURN DRESSING EX     metFORMIN 500 MG tablet  Commonly known as: GLUCOPHAGE     Multivitamin Adult (Minerals) tablet     neomycin-bacitracin-polymyxin 5-400-5000 ointment     sertraline 25 MG tablet  Commonly known as: ZOLOFT     traMADol 50 MG tablet  Commonly known as: ULTRAM     zinc oxide 20 % ointment     zinc sulfate 220 (50 Zn) MG capsule  Commonly known as: ZINCATE            No Known Allergies    Discharge Disposition:  Skilled Nursing Facility (DC - External)      Discharge Diet:  Diet Order   Procedures   • NPO Diet NPO Type: Strict NPO       Discharge Activity:   Activity Instructions     Activity as Tolerated            CODE STATUS:    Code Status and Medical Interventions:   Ordered at: 05/04/23 0606     Code Status (Patient has no pulse and is not breathing):    CPR (Attempt to Resuscitate)     Medical Interventions (Patient has pulse or is breathing):    Full Support       Future Appointments   Date Time Provider Department Center   5/17/2023  9:00 AM EMS MED [address] [place] [address] [facility]     Additional Instructions for the Follow-ups that You Need to Schedule     Discharge Follow-up with PCP   As directed       Currently Documented PCP:    Archie Murray MD    PCP Phone Number:    [phone]     Follow Up Details: 1-2 weeks            Contact information for follow-up providers     Archie Murray MD. Schedule an appointment as soon as possible  for a visit in 1 week(s).    Specialty: Pulmonary Disease  Contact information:  [address]  [place] [id number]  [phone]             Archie Murray MD .    Specialty: Pulmonary Disease  Why: 1-2 weeks  Contact information:  [address]  [address] [address]  [phone]                   Contact information for after-discharge care     Destination     [facility] .    Service: Skilled Nursing  Contact information:  [address] [facility] [address] [phone]                             Additional Instructions for the Follow-ups that You Need to Schedule     Discharge Follow-up with PCP   As directed       Currently Documented PCP:    Archie Murray MD    PCP Phone Number:    [phone]     Follow Up Details: 1-2 weeks           Time Spent on Discharge:  Greater than 30 minutes      Ruben Jordan MD  [facility]  05/17/23  08:04 EDT

## 2023-05-17 NOTE — PLAN OF CARE
Problem: Adult Inpatient Plan of Care  Goal: Plan of Care Review  Outcome: Met  Flowsheets (Taken 5/17/2023 0414)  Progress: no change  Plan of Care Reviewed With: patient  Outcome Evaluation: vss. plans to discharge via ems to [facility] today   Goal Outcome Evaluation:  Plan of Care Reviewed With: patient        Progress: no change  Outcome Evaluation: vss. plans to discharge via ems to [facility] today

## 2023-05-17 NOTE — PROGRESS NOTES
Enter Query Response Below      Query Response:     unable to clinically determine            If applicable, please update the problem list.     Patient: Shama Cruz        : 1955  Account: [id number]           Admit Date:         How to Respond to this query:       a. Click New Note     b. Answer query within the yellow box.                c. Update the Problem List, if applicable.      If you have any questions about this query contact me at: [email or web address]    ,     Patient admitted with sepsis, UTI due to catheter, respiratory failure, pneumonia. Patient has history of dysphagia and feeding tube present.  Aspiration precautions noted.  Procalcitonin 0.33, WBC increased to 13. CT --2. Bronchial wall thickening and mucous plugging within the lower lobes,  right greater than left 3. Mild atelectasis/consolidation in the bases of both lower lobes, right greater than left. Nares swab positive for MRSA.  Bronchoscopy/washing for thick secretions and possible mucus plugs- respiratory culture showed no growth.  Treatment includes vancomycin, levaquin, zosyn, IVF.     Please clarify if the patient is treated/monitored for the following:  -MRSA pneumonia  -pneumonia (please specify type)______________  -other__________________  -unable to clinically determine     By submitting this query, we are merely seeking further clarification of documentation to accurately reflect all conditions that you are monitoring, evaluating, treating or that extend the hospitalization or utilize additional resources of care. Please utilize your independent clinical judgment when addressing the question(s) above.     This query and your response, once completed, will be entered into the legal medical record.    Sincerely,  Dayanara Mckeon RN BSN  Clinical Documentation Integrity Program

## 2023-05-17 NOTE — PROGRESS NOTES
[facility] INPATIENT PROGRESS NOTE         [address]    2023      PATIENT IDENTIFICATION:  Name: Shama Cruz ADMIT: 2023   : 1955  PCP: Archie Murray MD    MRN: [id number] LOS: 13 days   AGE/SEX: 68 y.o. female  ROOM: [facility]                     LOS 13    Reason for visit: Respiratory failure      SUBJECTIVE:      No new pulmonary issues.  On room air.      Objective   OBJECTIVE:    Vital Sign Min/Max for last 24 hours  Temp  Min: 97.9 °F (36.6 °C)  Max: 98.8 °F (37.1 °C)   BP  Min: 86/59  Max: 141/62   Pulse  Min: 67  Max: 86   Resp  Min: 16  Max: 18   SpO2  Min: 90 %  Max: 100 %   No data recorded   Weight  Min: 62.1 kg (136 lb 14.5 oz)  Max: 62.1 kg (136 lb 14.5 oz)                   FiO2 (%): 21 %     Body mass index is 23.49 kg/m².    Intake/Output Summary (Last 24 hours) at 2023 0717  Last data filed at 2023 0500  Gross per 24 hour   Intake --   Output 1750 ml   Net -1750 ml         Exam:  GEN:  No distress, appears stated age.    NECK:  midline trachea  LUNGS: Nonlabored    Assessment     Scheduled meds:  acetaminophen, 1,000 mg, Oral, Once  amLODIPine, 10 mg, Per G Tube, Daily  castor oil-balsam peru, 1 application, Topical, Q12H  castor oil-balsam peru, 1 application, Topical, Q12H  chlorhexidine, 15 mL, Mouth/Throat, Q12H  cholestyramine, 1 packet, Oral, Q12H  furosemide, 20 mg, Oral, Daily  heparin (porcine), 5,000 Units, Subcutaneous, Q8H  insulin glargine, 45 Units, Subcutaneous, Daily  insulin regular, 4-24 Units, Subcutaneous, Q6H  ipratropium-albuterol, 3 mL, Nebulization, 4x Daily - RT  Menthol-Zinc Oxide, 1 application, Topical, BID  metoprolol tartrate, 75 mg, Oral, Q12H  mupirocin, 1 application, Topical, Q12H  nystatin, , Topical, Q12H  pantoprazole, 40 mg, Oral, Q AM  potassium chloride, 20 mEq, Oral, Daily  Scopolamine, 1 patch, Transdermal, Q72H  sodium chloride, 30 mL/kg, Intravenous, Once  sodium chloride, 500 mL, Intravenous,  Once  sodium chloride, 10 mL, Intravenous, Q12H      IV meds:                         Data Review:  Results from last 7 days   Lab Units 05/17/23  0604 05/15/23  0529 05/12/23  0636   SODIUM mmol/L 135* 135* 136   POTASSIUM mmol/L 3.9 4.1 4.9   CHLORIDE mmol/L 95* 96* 97*   CO2 mmol/L 27.7 27.0 22.8   BUN mg/dL 32* 36* 45*   CREATININE mg/dL 0.59 0.70 0.75   GLUCOSE mg/dL 156* 150* 238*   CALCIUM mg/dL 10.2 8.8 9.8         Estimated Creatinine Clearance: 90.7 mL/min (by C-G formula based on SCr of 0.59 mg/dL).  Results from last 7 days   Lab Units 05/17/23  0604 05/15/23  0529 05/13/23  0525 05/12/23  0636   WBC 10*3/mm3 10.25 9.98 11.68* 9.38   HEMOGLOBIN g/dL 12.8 12.2 12.2 12.4   PLATELETS 10*3/mm3 202 227 226 247             Results from last 7 days   Lab Units 05/12/23  0953   PH, ARTERIAL pH units 7.367   PO2 ART mm Hg 99.9   PCO2, ARTERIAL mm Hg 43.3   HCO3 ART mmol/L 24.9     Results from last 7 days   Lab Units 05/13/23  1043 05/12/23  0636   PROCALCITONIN ng/mL 0.22 0.18         Glucose   Date/Time Value Ref Range Status   05/17/2023 0614 167 (H) 70 - 130 mg/dL Final     Comment:     Meter: [id number] : [id number] Patience Blackmon NA   05/16/2023 2329 146 (H) 70 - 130 mg/dL Final     Comment:     Meter: [id number] : [id number] Patience Navarroia NA   05/16/2023 1759 158 (H) 70 - 130 mg/dL Final     Comment:     Meter: [id number] : [id number] Juliano Munoz NA   05/16/2023 1211 186 (H) 70 - 130 mg/dL Final     Comment:     Meter: [id number] : [id number] Juliano Munoz NA   05/16/2023 0557 172 (H) 70 - 130 mg/dL Final     Comment:     Meter: [id number] : [id number] Jamshid Ray NA   05/16/2023 0012 176 (H) 70 - 130 mg/dL Final     Comment:     Meter: [id number] : [id number] Lashonda STRONG   05/15/2023 1802 171 (H) 70 - 130 mg/dL Final     Comment:     Meter: [id number] : [id number] Green Tammy NA       Chest x-ray 5/15 shows small pleural effusions.      Microbiology  reviewed              Active Hospital Problems    Diagnosis  POA   • **Sepsis, due to unspecified organism, unspecified whether acute organ dysfunction present [A41.9]  Yes   • UTI (urinary tract infection) due to urinary indwelling catheter [T83.511A, N39.0]  Unknown   • Pneumonia [J18.9]  Unknown   • Lactic acidosis [E87.20]  Unknown   • Type 2 diabetes mellitus with hyperglycemia [E11.65]  Unknown   • Dementia without behavioral disturbance [F03.90]  Unknown   • Delirium due to another medical condition [F05]  Unknown   • Metabolic encephalopathy [G93.41]  Yes   • Adrenal nodule [E27.8]  Unknown   • History of ischemic stroke [Z86.73]  Not Applicable   • PEG (percutaneous endoscopic gastrostomy) status [Z93.1]  Not Applicable      Resolved Hospital Problems   No resolved problems to display.         ASSESSMENT:  Acute hypoxemic respiratory failure  Sepsis  Pneumonia  UTI  Lactic acidosis  Diabetes  Dementia  Status post PEG tube  3 cm indeterminate right adrenal nodule  Stridor: Resolved  History of previous stroke          PLAN:     Keep head of the bed elevated for aspiration risk.  Back to nursing home likely soon.  No objection from my standpoint.        Mega Malloy MD  Pulmonary and Critical Care Medicine  [facility], [facility]  5/17/2023    07:17 EDT

## 2023-05-18 NOTE — CASE MANAGEMENT/SOCIAL WORK
Continued Stay Note  [facility]     Patient Name: Shama Cruz  MRN: [id number]  Today's Date: 5/18/2023    Admit Date: 5/4/2023    Plan: [facility]   Discharge Plan       Row Name 05/18/23 6539       Plan    Plan Comments Emergency Guardianship hearing has been held.  [place] [employer] has been appointed full guardian and conservator.  Jeri HERNANDEZ                   Discharge Codes    No documentation.                 Expected Discharge Date and Time       Expected Discharge Date Expected Discharge Time    May 16, 2023               Jeri Otto RN

## 2023-05-18 NOTE — CASE MANAGEMENT/SOCIAL WORK
Case Management Discharge Note      Final Note: Discharged to [facility]         Selected Continued Care - Discharged on 5/17/2023 Admission date: 5/4/2023 - Discharge disposition: Skilled Nursing Facility (DC - External)    Destination Coordination complete.    Service Provider Selected Services Address Phone Fax Patient Preferred    [facility] [address] [address] [phone] --          Durable Medical Equipment    No services have been selected for the patient.              Dialysis/Infusion    No services have been selected for the patient.              Home Medical Care    No services have been selected for the patient.              Therapy    No services have been selected for the patient.              Community Resources    No services have been selected for the patient.              Community & DME    No services have been selected for the patient.                  Transportation Services  Ambulance: [facility]    Final Discharge Disposition Code: 04 - intermediate care facility

## 2024-03-06 ENCOUNTER — HOSPITAL ENCOUNTER (EMERGENCY)
Facility: HOSPITAL | Age: 69
Discharge: NURSING, REHAB OR LONG-TERM CARE | End: 2024-03-06
Attending: EMERGENCY MEDICINE | Admitting: EMERGENCY MEDICINE
Payer: MEDICARE

## 2024-03-06 ENCOUNTER — APPOINTMENT (OUTPATIENT)
Dept: GENERAL RADIOLOGY | Facility: HOSPITAL | Age: 69
End: 2024-03-06
Payer: MEDICARE

## 2024-03-06 VITALS
HEIGHT: 64 IN | BODY MASS INDEX: 23.22 KG/M2 | RESPIRATION RATE: 18 BRPM | HEART RATE: 70 BPM | WEIGHT: 136 LBS | DIASTOLIC BLOOD PRESSURE: 70 MMHG | TEMPERATURE: 97.7 F | OXYGEN SATURATION: 98 % | SYSTOLIC BLOOD PRESSURE: 150 MMHG

## 2024-03-06 DIAGNOSIS — K94.20 PROBLEM WITH GASTROSTOMY TUBE: Primary | ICD-10-CM

## 2024-03-06 PROCEDURE — 99283 EMERGENCY DEPT VISIT LOW MDM: CPT

## 2024-03-06 PROCEDURE — 74018 RADEX ABDOMEN 1 VIEW: CPT

## 2024-03-06 PROCEDURE — 0 DIATRIZOATE MEGLUMINE & SODIUM PER 1 ML: Performed by: EMERGENCY MEDICINE

## 2024-03-06 RX ADMIN — DIATRIZOATE MEGLUMINE AND DIATRIZOATE SODIUM 30 ML: 660; 100 LIQUID ORAL; RECTAL at 02:25

## 2024-03-06 NOTE — ED PROVIDER NOTES
EMERGENCY DEPARTMENT ENCOUNTER  Room Number:  Room/bed info not found  PCP: Archie Murray MD  Independent Historians: EMS and nursing facility staff      HPI:  Chief Complaint: had concerns including Gtube replacement .     A complete HPI/ROS/PMH/PSH/SH/FH are unobtainable due to: Aphasic at baseline      Context: Shama Cruz is a 69 y.o. female with a medical history of stroke, aphasia, dysphagia, UTI, diabetes, dementia, delirium, metabolic encephalopathy, who presents to the emergency department from [facility] after her G-tube was accidentally pulled out.  Nursing facility staff states they cannulated the insertion site with a Sharma catheter.      Review of prior external notes (non-ED) -and- Review of prior external test results outside of this encounter:   May 4, 2023: Patient was admitted to [facility] for respiratory distress and sepsis.      PAST MEDICAL HISTORY  Active Ambulatory Problems     Diagnosis Date Noted    Sepsis, due to unspecified organism, unspecified whether acute organ dysfunction present 05/04/2023    UTI (urinary tract infection) due to urinary indwelling catheter 05/14/2023    Pneumonia 05/14/2023    Lactic acidosis 05/14/2023    Type 2 diabetes mellitus with hyperglycemia 05/14/2023    Dementia without behavioral disturbance 05/14/2023    Delirium due to another medical condition 05/14/2023    Metabolic encephalopathy 05/14/2023    Adrenal nodule 05/14/2023    History of ischemic stroke 05/14/2023    PEG (percutaneous endoscopic gastrostomy) status 05/14/2023     Resolved Ambulatory Problems     Diagnosis Date Noted    No Resolved Ambulatory Problems     Past Medical History:   Diagnosis Date    Cerebral infarction     Chronic respiratory failure     Dementia     Depression     Diabetes mellitus     Hypertension          PAST SURGICAL HISTORY  No past surgical history on file.      FAMILY HISTORY  No family history on file.      SOCIAL  HISTORY  Social History     Socioeconomic History    Marital status:    Tobacco Use    Smoking status: Never     Passive exposure: Never (FRED; ETT)    Smokeless tobacco: Never   Vaping Use    Vaping status: Never Used   Substance and Sexual Activity    Alcohol use: Defer    Drug use: Defer    Sexual activity: Defer         ALLERGIES  Patient has no known allergies.      REVIEW OF SYSTEMS  Included in HPI  All systems reviewed and negative except for those discussed in HPI.      PHYSICAL EXAM    I have reviewed the triage vital signs and nursing notes.    ED Triage Vitals [03/06/24 0049]   Temp Heart Rate Resp BP SpO2   97.7 °F (36.5 °C) 70 18 150/70 98 %      Temp src Heart Rate Source Patient Position BP Location FiO2 (%)   Oral Monitor Sitting Right arm --       Physical Exam  Vitals and nursing note reviewed.   Constitutional:       General: She is awake. She is not in acute distress.     Appearance: She is ill-appearing (Chronic ill-appearing). She is not toxic-appearing.   HENT:      Head: Normocephalic and atraumatic.   Eyes:      General: Lids are normal. Vision grossly intact.   Cardiovascular:      Rate and Rhythm: Normal rate and regular rhythm.      Heart sounds: Normal heart sounds.   Pulmonary:      Effort: Pulmonary effort is normal.      Breath sounds: Normal breath sounds and air entry.   Abdominal:      General: Bowel sounds are normal.      Palpations: Abdomen is soft.      Tenderness: There is no abdominal tenderness.       Skin:     General: Skin is warm and dry.      Coloration: Skin is not pale.   Neurological:      Mental Status: She is alert.      Comments: Patient is aphasic at baseline secondary to stroke, she is able to nod and shake her head in agreement or disagreement.   Psychiatric:         Attention and Perception: Attention normal.         Mood and Affect: Mood normal.         Speech: Speech normal.         Behavior: Behavior is cooperative.           LAB RESULTS  No results  found for this or any previous visit (from the past 24 hour(s)).      RADIOLOGY  XR Abdomen KUB    Result Date: 3/6/2024  KUB  HISTORY: Gastrostomy tube replacement  COMPARISON: None available.  FINDINGS: Initial  image shows a nonobstructive bowel gas pattern. Patient is status post endovascular repair of an aortic aneurysm. No free air is seen. Subsequently, 30 cc of Gastrografin was instilled through the tube. No obvious extravasation of contrast material is seen. Catheter appears to be positioned within the distal stomach.      Gastrostomy tube appears to be positioned within the stomach.  This report was finalized on 3/6/2024 2:43 AM by Dr. Marisol Quinones M.D on Workstation: [id number]         MEDICATIONS GIVEN IN ER  Medications   diatrizoate meglumine-sodium (GASTROGRAFIN) 66-10 % oral solution 30 mL (30 mL Oral Given 3/6/24 0225)           OUTPATIENT MEDICATION MANAGEMENT:  No current Epic-ordered facility-administered medications on file.     Current Outpatient Medications Ordered in Epic   Medication Sig Dispense Refill    acetaminophen (TYLENOL) 325 MG tablet Administer 2 tablets per G tube Every 6 (Six) Hours As Needed for Mild Pain.      amLODIPine (NORVASC) 10 MG tablet Administer 1 tablet per G tube Daily.      atorvastatin (LIPITOR) 80 MG tablet Administer 1 tablet per G tube Daily.      Cholecalciferol liquid Administer 800 Units per G tube Daily.      cholestyramine (QUESTRAN) 4 g packet Take 1 packet by mouth Every 12 (Twelve) Hours.      furosemide (LASIX) 20 MG tablet Take 1 tablet by mouth Daily.      guaifenesin (ROBITUSSIN) 100 MG/5ML liquid Take 10 mL by mouth Every 4 (Four) Hours As Needed for Cough.      insulin glargine (LANTUS, SEMGLEE) 100 UNIT/ML injection Inject 45 Units under the skin into the appropriate area as directed Daily.  12    ipratropium-albuterol (DUO-NEB) 0.5-2.5 mg/3 ml nebulizer Take 3 mL by nebulization 4 (Four) Times a Day. 360 mL     lansoprazole 3 mg/mL in  sodium bicarbonate injection 8.4% Take 10 mL by mouth Daily.      loperamide (IMODIUM A-D) 1 MG/7.5ML suspension Take 15 mL by mouth 4 (Four) Times a Day As Needed (diarrhea).      metoprolol tartrate 75 MG tablet Take 75 mg by mouth Every 12 (Twelve) Hours.      nystatin (MYCOSTATIN) 366445 UNIT/GM powder Apply  topically to the appropriate area as directed Every 12 (Twelve) Hours.      Omega-3 Fatty Acids (fish oil) 1000 MG capsule capsule Take 2 capsules by mouth 2 (Two) Times a Day.      potassium chloride ER (K-TAB) 20 MEQ tablet controlled-release ER tablet 1 tablet Daily.           PROCEDURES  Feeding Tube Replacement    Date/Time: 3/6/2024 2:05 AM    Performed by: Reta Norman APRN  Authorized by: Dmitry Farfan MD    Consent:     Consent obtained:  Verbal and emergent situation    Consent given by:  Patient (Attempted to call guardian, only available during normal business hours from 8-4 30.)    Risks, benefits, and alternatives were discussed: yes      Risks discussed:  Bleeding, infection and pain  Universal protocol:     Patient identity confirmed:  Arm band and hospital-assigned identification number  Pre-procedure details:     Old tube type:  Gastrostomy    Old tube size: 20F.  Anesthesia:     Anesthesia method:  None  Procedure details:     Tube type:  Gastrostomy    Tube size: 20F.    Bulb inflation volume:  10ml    Bulb inflation fluid:  Normal saline  Post-procedure details:     Placement/position confirmation:  Gastric contents aspirated and x-ray    Placement difficulty:  None    Bleeding:  None    Procedure completion:  Tolerated          PROGRESS, DATA ANALYSIS, CONSULTS, AND MEDICAL DECISION MAKING  ORDERS PLACED DURING THIS VISIT:  Orders Placed This Encounter   Procedures    Feeding Tube Replacement    XR Abdomen KUB       All labs have been independently interpreted by me.  All radiology studies have been reviewed by me. All EKG's have been independently viewed and interpreted by  me.  Discussion below represents my analysis of pertinent findings related to patient's condition, differential diagnosis, treatment plan and final disposition.    Differential diagnosis includes but is not limited to:   Cellulitis, G-tube dislodgment, G-tube obstruction, etc.    ED Course:  ED Course as of 03/06/24 0320   Wed Mar 06, 2024   0200 Verified size G tube with nursing home staff.  They state the G-tube that was pulled out was a 20 Nigerian. [AR]   0201 Removed Sharma catheter that was cannulating G-tube insertion site without issue, patient tolerated well. [AR]   0240 I reviewed the KUB on PACS.  My independent interpretation is G-tube in place within the stomach. [AR]   0242 I discussed the case with Dr. Farfan and they agree to evaluate the patient at the bedside.    [AR]   0251 I discussed with the patient ED findings.  Patient is aphasic.  Attempted to call guardian however it is after office hours.  [facility] notified of ED findings and replacement of G-tube. [AR]      ED Course User Index  [AR] Reta Norman APRN         MDM:  Patient is a 69-year-old female who presents to the emergency department after her G-tube accidentally got pulled out.  Patient is aphasic at baseline however is able to nod and shake her head in agreement/disagreement.  Attempted to speak with patient's guardian however the state guardian keeps normal business hours of 8-4 30.  Verified G-tube size with nursing home staff as after the tube was pulled out they cannulated the insertion site with a Sharma catheter.  G-tube replaced, x-ray confirmed placement.  Patient will be discharged back to nursing facility.      COMPLEXITY OF CARE  Admission was considered but after careful review of the patient's presentation, physical examination, diagnostic results, and response to treatment the patient may be safely discharged with outpatient follow-up.        DIAGNOSIS  Final diagnoses:   Problem with gastrostomy tube          DISPOSITION  ED Disposition       ED Disposition   Discharge    Condition   Stable    Comment   --                    Please note that portions of this document were completed with a voice recognition program.    Note Disclaimer: At [facility], we believe that sharing information builds trust and better relationships. You are receiving this note because you recently visited [facility]. It is possible you will see health information before a provider has talked with you about it. This kind of information can be easy to misunderstand. To help you fully understand what it means for your health, we urge you to discuss this note with your provider.      Reta Normna, MEMO  03/06/24 0322

## 2024-03-06 NOTE — ED PROVIDER NOTES
MD ATTESTATION NOTE    SHARED VISIT: This visit was performed by BOTH a physician and an APC. The substantive portion of the medical decision making was performed by this attesting physician who made or approved the management plan and takes responsibility for patient management. All studies documented in the APC note (if performed) were independently interpreted by me.    The SHYAM and I have discussed this patient's history, physical exam, MDM, and treatment plan.  I have reviewed the documentation and personally had a face to face interaction with the patient. The attached note describes my personal findings.      Shama Cruz is a 69 y.o. female who presents to the ED c/o acute dislodging her G-tube.  No clear timeline per staff at nursing facility.  Patient has history of stroke and is aphasic at baseline.    On exam:  GENERAL: not distressed  HENT: nares patent  EYES: no scleral icterus  CV: regular rhythm, regular rate  RESPIRATORY: normal effort  ABDOMEN: soft, nontender, no sign of infection irritation at G-tube site  MUSCULOSKELETAL: no deformity  NEURO: alert,  SKIN: warm, dry    Labs  No results found for this or any previous visit (from the past 24 hour(s)).    Radiology  XR Abdomen KUB    Result Date: 3/6/2024  KUB  HISTORY: Gastrostomy tube replacement  COMPARISON: None available.  FINDINGS: Initial  image shows a nonobstructive bowel gas pattern. Patient is status post endovascular repair of an aortic aneurysm. No free air is seen. Subsequently, 30 cc of Gastrografin was instilled through the tube. No obvious extravasation of contrast material is seen. Catheter appears to be positioned within the distal stomach.      Gastrostomy tube appears to be positioned within the stomach.  This report was finalized on 3/6/2024 2:43 AM by Dr. Marisol Quinones M.D on Workstation: [id number]       Medications given in the ED:  Medications   diatrizoate meglumine-sodium (GASTROGRAFIN) 66-10 % oral solution  30 mL (30 mL Oral Given 3/6/24 0225)       Orders placed during this visit:  Orders Placed This Encounter   Procedures    Feeding Tube Replacement    XR Abdomen KUB       Medical Decision Making:  ED Course as of 03/06/24 0813   Wed Mar 06, 2024   0200 Verified size G tube with nursing home staff.  They state the G-tube that was pulled out was a 20 Japanese. [AR]   0201 Removed Sharma catheter that was cannulating G-tube insertion site without issue, patient tolerated well. [AR]   0240 I reviewed the KUB on PACS.  My independent interpretation is G-tube in place within the stomach. [AR]   0242 I discussed the case with Dr. Farfan and they agree to evaluate the patient at the bedside.    [AR]   0251 I discussed with the patient ED findings.  Patient is aphasic.  Attempted to call guardian however it is after office hours.  [facility] notified of ED findings and replacement of G-tube. [AR]      ED Course User Index  [AR] Reta Norman, APRN       Differential diagnosis:  Dislodged G-tube, obstruction, G-tube malfunction    Diagnosis  Final diagnoses:   Problem with gastrostomy tube          Dmitry Farfan MD  03/06/24 0811       Dmitry Farfan MD  03/06/24 0813

## 2024-03-06 NOTE — ED NOTES
Called [facility] ([phone]) and gave report to Red.  Once EMS gives us an ETA, this nurse will call Red back with an update.

## 2024-03-06 NOTE — ED NOTES
EMS called back with ETA of 0400.  Called Red back at [facility] and advised pickup time.